# Patient Record
Sex: FEMALE | Race: WHITE | NOT HISPANIC OR LATINO | Employment: OTHER | URBAN - METROPOLITAN AREA
[De-identification: names, ages, dates, MRNs, and addresses within clinical notes are randomized per-mention and may not be internally consistent; named-entity substitution may affect disease eponyms.]

---

## 2017-01-18 RX ORDER — SIMVASTATIN 40 MG
40 TABLET ORAL NIGHTLY
Qty: 90 TABLET | Refills: 1 | Status: SHIPPED | OUTPATIENT
Start: 2017-01-18 | End: 2017-08-24 | Stop reason: SDUPTHER

## 2017-08-24 ENCOUNTER — OFFICE VISIT (OUTPATIENT)
Dept: INTERNAL MEDICINE | Facility: CLINIC | Age: 72
End: 2017-08-24

## 2017-08-24 VITALS
SYSTOLIC BLOOD PRESSURE: 148 MMHG | DIASTOLIC BLOOD PRESSURE: 84 MMHG | WEIGHT: 168 LBS | BODY MASS INDEX: 28.84 KG/M2 | OXYGEN SATURATION: 98 % | HEART RATE: 63 BPM

## 2017-08-24 DIAGNOSIS — R41.3 MEMORY CHANGES: ICD-10-CM

## 2017-08-24 DIAGNOSIS — Z00.00 HEALTH CARE MAINTENANCE: ICD-10-CM

## 2017-08-24 DIAGNOSIS — E78.5 HYPERLIPIDEMIA, UNSPECIFIED HYPERLIPIDEMIA TYPE: Primary | ICD-10-CM

## 2017-08-24 DIAGNOSIS — I10 ESSENTIAL HYPERTENSION: ICD-10-CM

## 2017-08-24 LAB
ALBUMIN SERPL-MCNC: 4.5 G/DL (ref 3.2–4.8)
ALBUMIN/GLOB SERPL: 1.5 G/DL (ref 1.5–2.5)
ALP SERPL-CCNC: 96 U/L (ref 25–100)
ALT SERPL W P-5'-P-CCNC: 18 U/L (ref 7–40)
ANION GAP SERPL CALCULATED.3IONS-SCNC: 6 MMOL/L (ref 3–11)
ARTICHOKE IGE QN: 188 MG/DL (ref 0–130)
AST SERPL-CCNC: 21 U/L (ref 0–33)
BILIRUB SERPL-MCNC: 0.5 MG/DL (ref 0.3–1.2)
BUN BLD-MCNC: 23 MG/DL (ref 9–23)
BUN/CREAT SERPL: 32.9 (ref 7–25)
CALCIUM SPEC-SCNC: 10 MG/DL (ref 8.7–10.4)
CHLORIDE SERPL-SCNC: 103 MMOL/L (ref 99–109)
CHOLEST SERPL-MCNC: 270 MG/DL (ref 0–200)
CO2 SERPL-SCNC: 30 MMOL/L (ref 20–31)
CREAT BLD-MCNC: 0.7 MG/DL (ref 0.6–1.3)
DEPRECATED RDW RBC AUTO: 42.9 FL (ref 37–54)
ERYTHROCYTE [DISTWIDTH] IN BLOOD BY AUTOMATED COUNT: 13.1 % (ref 11.3–14.5)
GFR SERPL CREATININE-BSD FRML MDRD: 83 ML/MIN/1.73
GLOBULIN UR ELPH-MCNC: 3 GM/DL
GLUCOSE BLD-MCNC: 116 MG/DL (ref 70–100)
HCT VFR BLD AUTO: 41.7 % (ref 34.5–44)
HCV AB SER DONR QL: NORMAL
HDLC SERPL-MCNC: 63 MG/DL (ref 40–60)
HGB BLD-MCNC: 13.4 G/DL (ref 11.5–15.5)
MCH RBC QN AUTO: 28.5 PG (ref 27–31)
MCHC RBC AUTO-ENTMCNC: 32.1 G/DL (ref 32–36)
MCV RBC AUTO: 88.5 FL (ref 80–99)
PLATELET # BLD AUTO: 282 10*3/MM3 (ref 150–450)
PMV BLD AUTO: 9.8 FL (ref 6–12)
POTASSIUM BLD-SCNC: 4.9 MMOL/L (ref 3.5–5.5)
PROT SERPL-MCNC: 7.5 G/DL (ref 5.7–8.2)
RBC # BLD AUTO: 4.71 10*6/MM3 (ref 3.89–5.14)
SODIUM BLD-SCNC: 139 MMOL/L (ref 132–146)
TRIGL SERPL-MCNC: 87 MG/DL (ref 0–150)
WBC NRBC COR # BLD: 7.38 10*3/MM3 (ref 3.5–10.8)

## 2017-08-24 PROCEDURE — 80061 LIPID PANEL: CPT | Performed by: PHYSICIAN ASSISTANT

## 2017-08-24 PROCEDURE — 85027 COMPLETE CBC AUTOMATED: CPT | Performed by: PHYSICIAN ASSISTANT

## 2017-08-24 PROCEDURE — 99213 OFFICE O/P EST LOW 20 MIN: CPT | Performed by: PHYSICIAN ASSISTANT

## 2017-08-24 PROCEDURE — 86803 HEPATITIS C AB TEST: CPT | Performed by: PHYSICIAN ASSISTANT

## 2017-08-24 PROCEDURE — 80053 COMPREHEN METABOLIC PANEL: CPT | Performed by: PHYSICIAN ASSISTANT

## 2017-08-24 RX ORDER — LISINOPRIL 10 MG/1
10 TABLET ORAL DAILY
Qty: 30 TABLET | Refills: 5 | Status: SHIPPED | OUTPATIENT
Start: 2017-08-24 | End: 2017-09-25 | Stop reason: SDUPTHER

## 2017-08-24 RX ORDER — SIMVASTATIN 40 MG
40 TABLET ORAL NIGHTLY
Qty: 90 TABLET | Refills: 1 | Status: SHIPPED | OUTPATIENT
Start: 2017-08-24 | End: 2018-01-05

## 2017-08-24 NOTE — PROGRESS NOTES
Chief Complaint   Patient presents with   • Follow-up     hyperlipidemia, lab work pt is fasting       Subjective   Mariaelena Easley is a 72 y.o. female.       History of Present Illness     Pt has had resolution of the intermittent diarrhea she was having at last appt. Occasionally has episodes but not has frequent as it was.    She has continued to take her usual vitamins and supplements, started taking calcium and a combination memory supplement.    She ran out of her simvastatin and has come in for cholesterol check.    Notes that she has noticed some increasing difficulty organizing her thoughts, finding words at times. She is a multi-yakelin and has realized that she does not complete tasks as well as she used to. 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      Current Outpatient Prescriptions:   •  aspirin (ASPIRIN LOW DOSE) 81 MG chewable tablet, Chew daily., Disp: , Rfl:   •  Cholecalciferol (VITAMIN D3) 5000 UNIT/ML liquid, Take  by mouth., Disp: , Rfl:   •  melatonin 5 MG tablet tablet, Take 5 mg by mouth., Disp: , Rfl:   •  simvastatin (ZOCOR) 40 MG tablet, Take 1 tablet by mouth Every Night., Disp: 90 tablet, Rfl: 1  •  vitamin B-12 (CYANOCOBALAMIN) 1000 MCG tablet, Take 1,000 mcg by mouth daily., Disp: , Rfl:   •  ZOLMitriptan (ZOMIG-ZMT) 5 MG disintegrating tablet, DISSOLVE 1 TAB ON TONGUE AND SWALLOW AT ONSET OF MIGRAINE. MAY REPEAT ONCE AFTER 2 HOURS. MAX 10 MG/DAY, Disp: 3 tablet, Rfl: 0  •  lisinopril (PRINIVIL,ZESTRIL) 10 MG tablet, Take 1 tablet by mouth Daily., Disp: 30 tablet, Rfl: 5     PMFSH  The following portions of the patient's history were reviewed and updated as appropriate: allergies, current medications, past family history, past medical history, past social history, past surgical history and problem list.    Review of Systems   Constitutional: Negative for chills, fatigue,  fever and unexpected weight change.   HENT: Negative.    Eyes: Negative for pain and visual disturbance.   Respiratory: Negative for chest tightness and shortness of breath.    Cardiovascular: Negative for chest pain and palpitations.   Gastrointestinal: Negative for abdominal pain and blood in stool.   Endocrine: Negative.    Genitourinary: Negative.    Musculoskeletal: Negative for joint swelling.   Skin: Negative for color change, rash and wound.   Allergic/Immunologic: Negative.    Neurological: Negative for dizziness, syncope, speech difficulty, light-headedness and headaches.   Hematological: Negative for adenopathy.   Psychiatric/Behavioral: Positive for decreased concentration. Negative for confusion, hallucinations, sleep disturbance and suicidal ideas. The patient is not nervous/anxious.        Objective   /84  Pulse 63  Wt 168 lb (76.2 kg)  LMP  (LMP Unknown)  SpO2 98%  BMI 28.84 kg/m2    Physical Exam   Constitutional: She appears well-developed and well-nourished.   HENT:   Head: Normocephalic.   Right Ear: Hearing, tympanic membrane, external ear and ear canal normal.   Left Ear: Hearing, tympanic membrane, external ear and ear canal normal.   Nose: Nose normal.   Mouth/Throat: Oropharynx is clear and moist.   Eyes: Conjunctivae are normal. Pupils are equal, round, and reactive to light.   Neck: Normal range of motion.   Cardiovascular: Normal rate, regular rhythm and normal heart sounds.    Pulmonary/Chest: Effort normal and breath sounds normal. She has no decreased breath sounds. She has no wheezes. She has no rhonchi. She has no rales.   Musculoskeletal: Normal range of motion.   Neurological: She is alert.   Skin: Skin is warm and dry.   Psychiatric: She has a normal mood and affect. Her behavior is normal.   Nursing note and vitals reviewed.      Results for orders placed or performed in visit on 08/24/17   Lipid Panel   Result Value Ref Range    Total Cholesterol 270 (H) 0 - 200 mg/dL     Triglycerides 87 0 - 150 mg/dL    HDL Cholesterol 63 (H) 40 - 60 mg/dL    LDL Cholesterol  188 (H) 0 - 130 mg/dL   Comprehensive Metabolic Panel   Result Value Ref Range    Glucose 116 (H) 70 - 100 mg/dL    BUN 23 9 - 23 mg/dL    Creatinine 0.70 0.60 - 1.30 mg/dL    Sodium 139 132 - 146 mmol/L    Potassium 4.9 3.5 - 5.5 mmol/L    Chloride 103 99 - 109 mmol/L    CO2 30.0 20.0 - 31.0 mmol/L    Calcium 10.0 8.7 - 10.4 mg/dL    Total Protein 7.5 5.7 - 8.2 g/dL    Albumin 4.50 3.20 - 4.80 g/dL    ALT (SGPT) 18 7 - 40 U/L    AST (SGOT) 21 0 - 33 U/L    Alkaline Phosphatase 96 25 - 100 U/L    Total Bilirubin 0.5 0.3 - 1.2 mg/dL    eGFR Non African Amer 83 >60 mL/min/1.73    Globulin 3.0 gm/dL    A/G Ratio 1.5 1.5 - 2.5 g/dL    BUN/Creatinine Ratio 32.9 (H) 7.0 - 25.0    Anion Gap 6.0 3.0 - 11.0 mmol/L   CBC (No Diff)   Result Value Ref Range    WBC 7.38 3.50 - 10.80 10*3/mm3    RBC 4.71 3.89 - 5.14 10*6/mm3    Hemoglobin 13.4 11.5 - 15.5 g/dL    Hematocrit 41.7 34.5 - 44.0 %    MCV 88.5 80.0 - 99.0 fL    MCH 28.5 27.0 - 31.0 pg    MCHC 32.1 32.0 - 36.0 g/dL    RDW 13.1 11.3 - 14.5 %    RDW-SD 42.9 37.0 - 54.0 fl    MPV 9.8 6.0 - 12.0 fL    Platelets 282 150 - 450 10*3/mm3   Hepatitis C Antibody   Result Value Ref Range    Hepatitis C Ab Non-Reactive Non-Reactive        ASSESSMENT/PLAN    Problem List Items Addressed This Visit        Cardiovascular and Mediastinum    Hyperlipidemia - Primary     Recheck lipid profile         Relevant Medications    simvastatin (ZOCOR) 40 MG tablet    Other Relevant Orders    Lipid Panel (Completed)    Comprehensive Metabolic Panel (Completed)    Essential hypertension     Hypertension is newly identified.  Dietary sodium restriction.  Weight loss.  Regular aerobic exercise.  Medication changes per orders. Start Lisinopril 10 mg daily.  Blood pressure will be reassessed in 4 weeks.         Relevant Medications    lisinopril (PRINIVIL,ZESTRIL) 10 MG tablet    Other Relevant Orders    CBC  (No Diff) (Completed)      Other Visit Diagnoses     Health care maintenance        Memory changes        Reassurance that MMSE is normal. Changes likely secondary to increased stress and attempting to take on too many responsibilities.               Return in about 4 weeks (around 9/21/2017) for Recheck.

## 2017-08-26 PROBLEM — I10 ESSENTIAL HYPERTENSION: Status: ACTIVE | Noted: 2017-08-26

## 2017-08-27 NOTE — ASSESSMENT & PLAN NOTE
Hypertension is newly identified.  Dietary sodium restriction.  Weight loss.  Regular aerobic exercise.  Medication changes per orders. Start Lisinopril 10 mg daily.  Blood pressure will be reassessed in 4 weeks.

## 2017-09-25 ENCOUNTER — OFFICE VISIT (OUTPATIENT)
Dept: INTERNAL MEDICINE | Facility: CLINIC | Age: 72
End: 2017-09-25

## 2017-09-25 VITALS
WEIGHT: 164.24 LBS | BODY MASS INDEX: 28.19 KG/M2 | SYSTOLIC BLOOD PRESSURE: 132 MMHG | HEART RATE: 69 BPM | DIASTOLIC BLOOD PRESSURE: 68 MMHG | OXYGEN SATURATION: 98 %

## 2017-09-25 DIAGNOSIS — R73.03 PREDIABETES: ICD-10-CM

## 2017-09-25 DIAGNOSIS — I10 ESSENTIAL HYPERTENSION: ICD-10-CM

## 2017-09-25 DIAGNOSIS — G43.009 NONINTRACTABLE MIGRAINE, UNSPECIFIED MIGRAINE TYPE: Primary | ICD-10-CM

## 2017-09-25 LAB
ANION GAP SERPL CALCULATED.3IONS-SCNC: 8 MMOL/L (ref 3–11)
BUN BLD-MCNC: 15 MG/DL (ref 9–23)
BUN/CREAT SERPL: 18.8 (ref 7–25)
CALCIUM SPEC-SCNC: 10.3 MG/DL (ref 8.7–10.4)
CHLORIDE SERPL-SCNC: 101 MMOL/L (ref 99–109)
CO2 SERPL-SCNC: 32 MMOL/L (ref 20–31)
CREAT BLD-MCNC: 0.8 MG/DL (ref 0.6–1.3)
GFR SERPL CREATININE-BSD FRML MDRD: 71 ML/MIN/1.73
GLUCOSE BLD-MCNC: 102 MG/DL (ref 70–100)
HBA1C MFR BLD: 6.4 %
POTASSIUM BLD-SCNC: 4.5 MMOL/L (ref 3.5–5.5)
SODIUM BLD-SCNC: 141 MMOL/L (ref 132–146)

## 2017-09-25 PROCEDURE — 99214 OFFICE O/P EST MOD 30 MIN: CPT | Performed by: PHYSICIAN ASSISTANT

## 2017-09-25 PROCEDURE — 83036 HEMOGLOBIN GLYCOSYLATED A1C: CPT | Performed by: PHYSICIAN ASSISTANT

## 2017-09-25 PROCEDURE — G0008 ADMIN INFLUENZA VIRUS VAC: HCPCS | Performed by: PHYSICIAN ASSISTANT

## 2017-09-25 PROCEDURE — 80048 BASIC METABOLIC PNL TOTAL CA: CPT | Performed by: PHYSICIAN ASSISTANT

## 2017-09-25 PROCEDURE — 90662 IIV NO PRSV INCREASED AG IM: CPT | Performed by: PHYSICIAN ASSISTANT

## 2017-09-25 RX ORDER — LISINOPRIL 10 MG/1
10 TABLET ORAL DAILY
Qty: 90 TABLET | Refills: 3 | Status: SHIPPED | OUTPATIENT
Start: 2017-09-25 | End: 2018-01-05

## 2017-09-25 RX ORDER — ZOLMITRIPTAN 5 MG/1
TABLET, ORALLY DISINTEGRATING ORAL
Qty: 5 TABLET | Refills: 3 | Status: SHIPPED | OUTPATIENT
Start: 2017-09-25 | End: 2017-11-21

## 2017-09-25 NOTE — PROGRESS NOTES
Chief Complaint   Patient presents with   • Follow-up     hypertension, pt is fasting       Subjective   Mariaelena Easley is a 72 y.o. female.       History of Present Illness     Pt has been taking the lisinopril daily as directed. She checked her BP at [facility] one time and the reading was 130 systolic.     She also notes that she has been checking her glucose with her 's glucometer. Her readings have been above 100 when she checks.    She has been back on her statin since she was last here. Has taken it consistently for the past 4 weeks.    Needs refill of zomig she uses for silent migraines. Can use it at the onset of migraine and then goes to bed and sleeps it off.      Current Outpatient Prescriptions:   •  aspirin (ASPIRIN LOW DOSE) 81 MG chewable tablet, Chew daily., Disp: , Rfl:   •  Cholecalciferol (VITAMIN D3) 5000 UNIT/ML liquid, Take  by mouth., Disp: , Rfl:   •  lisinopril (PRINIVIL,ZESTRIL) 10 MG tablet, Take 1 tablet by mouth Daily., Disp: 90 tablet, Rfl: 3  •  melatonin 5 MG tablet tablet, Take 5 mg by mouth., Disp: , Rfl:   •  simvastatin (ZOCOR) 40 MG tablet, Take 1 tablet by mouth Every Night., Disp: 90 tablet, Rfl: 1  •  vitamin B-12 (CYANOCOBALAMIN) 1000 MCG tablet, Take 1,000 mcg by mouth daily., Disp: , Rfl:   •  ZOLMitriptan (ZOMIG-ZMT) 5 MG disintegrating tablet, DISSOLVE 1 TAB ON TONGUE AND SWALLOW AT ONSET OF MIGRAINE. MAY REPEAT ONCE AFTER 2 HOURS. MAX 10 MG/DAY, Disp: 5 tablet, Rfl: 3     PMFSH  The following portions of the patient's history were reviewed and updated as appropriate: allergies, current medications, past family history, past medical history, past social history, past surgical history and problem list.    Review of Systems   Constitutional: Negative for activity change, appetite change, diaphoresis, fatigue and fever.   HENT: Negative for postnasal drip and rhinorrhea.    Respiratory: Negative for chest tightness, shortness of breath and wheezing.    Cardiovascular:  Negative for chest pain, palpitations and leg swelling.   Gastrointestinal: Negative.    Genitourinary: Negative.    Musculoskeletal: Negative for arthralgias and myalgias.   Skin: Negative.    Neurological: Negative for dizziness, syncope, weakness and light-headedness.   Hematological: Negative for adenopathy.       Objective   /68  Pulse 69  Wt 164 lb 3.9 oz (74.5 kg)  LMP  (LMP Unknown)  SpO2 98%  BMI 28.19 kg/m2    Physical Exam   Constitutional: She appears well-developed and well-nourished.   HENT:   Head: Normocephalic.   Right Ear: Hearing, tympanic membrane, external ear and ear canal normal.   Left Ear: Hearing, tympanic membrane, external ear and ear canal normal.   Nose: Nose normal.   Mouth/Throat: Oropharynx is clear and moist.   Eyes: Conjunctivae are normal. Pupils are equal, round, and reactive to light.   Neck: Normal range of motion.   Cardiovascular: Normal rate, regular rhythm and normal heart sounds.    Pulmonary/Chest: Effort normal and breath sounds normal. She has no decreased breath sounds. She has no wheezes. She has no rhonchi. She has no rales.   Musculoskeletal: Normal range of motion.   Neurological: She is alert.   Skin: Skin is warm and dry.   Psychiatric: She has a normal mood and affect. Her behavior is normal.   Nursing note and vitals reviewed.      Results for orders placed or performed in visit on 09/25/17   Basic Metabolic Panel   Result Value Ref Range    Glucose 102 (H) 70 - 100 mg/dL    BUN 15 9 - 23 mg/dL    Creatinine 0.80 0.60 - 1.30 mg/dL    Sodium 141 132 - 146 mmol/L    Potassium 4.5 3.5 - 5.5 mmol/L    Chloride 101 99 - 109 mmol/L    CO2 32.0 (H) 20.0 - 31.0 mmol/L    Calcium 10.3 8.7 - 10.4 mg/dL    eGFR Non African Amer 71 >60 mL/min/1.73    BUN/Creatinine Ratio 18.8 7.0 - 25.0    Anion Gap 8.0 3.0 - 11.0 mmol/L   POC Glycosylated Hemoglobin (Hb A1C)   Result Value Ref Range    Hemoglobin A1C 6.4 %        ASSESSMENT/PLAN    Problem List Items Addressed  This Visit        Cardiovascular and Mediastinum    Migraine headache - Primary    Relevant Medications    ZOLMitriptan (ZOMIG-ZMT) 5 MG disintegrating tablet    Essential hypertension     Hypertension is improving with treatment.  Continue current treatment regimen.  Blood pressure will be reassessed at the next regular appointment.         Relevant Medications    lisinopril (PRINIVIL,ZESTRIL) 10 MG tablet    Other Relevant Orders    Basic Metabolic Panel (Completed)       Other    Prediabetes     Discussed diet and exercise recommendation. She will work on lifestyle changes.         Relevant Orders    POC Glycosylated Hemoglobin (Hb A1C) (Completed)               Return in about 3 months (around 12/25/2017) for Recheck cholesterol.

## 2017-11-10 ENCOUNTER — TELEPHONE (OUTPATIENT)
Dept: INTERNAL MEDICINE | Facility: CLINIC | Age: 72
End: 2017-11-10

## 2017-11-10 NOTE — TELEPHONE ENCOUNTER
Received PA request via fax from [facility] pharmacy for Zolmitriptan 5 mg ODT tab, submitted PA via [email or web address], awaiting response.     11/13/17  PA was denied due to absence of trial and failure of the preferred alternatives first. Denial ppw given to prescriber for further review.

## 2017-11-21 ENCOUNTER — TELEPHONE (OUTPATIENT)
Dept: INTERNAL MEDICINE | Facility: CLINIC | Age: 72
End: 2017-11-21

## 2017-11-21 DIAGNOSIS — G43.909 MIGRAINE WITHOUT STATUS MIGRAINOSUS, NOT INTRACTABLE, UNSPECIFIED MIGRAINE TYPE: Primary | ICD-10-CM

## 2017-11-21 RX ORDER — PROMETHAZINE HYDROCHLORIDE 25 MG/1
25 TABLET ORAL EVERY 6 HOURS PRN
Qty: 20 TABLET | Refills: 0 | Status: SHIPPED | OUTPATIENT
Start: 2017-11-21 | End: 2017-11-21 | Stop reason: SDUPTHER

## 2017-11-21 RX ORDER — RIZATRIPTAN BENZOATE 10 MG/1
10 TABLET, ORALLY DISINTEGRATING ORAL ONCE AS NEEDED
Qty: 9 TABLET | Refills: 5 | Status: SHIPPED | OUTPATIENT
Start: 2017-11-21 | End: 2019-04-17 | Stop reason: SDUPTHER

## 2017-11-21 RX ORDER — PROMETHAZINE HYDROCHLORIDE 25 MG/1
25 TABLET ORAL EVERY 6 HOURS PRN
Qty: 20 TABLET | Refills: 0 | Status: SHIPPED | OUTPATIENT
Start: 2017-11-21 | End: 2021-12-15

## 2017-11-21 NOTE — TELEPHONE ENCOUNTER
PT CALLED AND HAS HAD REALLY BAD MIGRAINES AND SHE CANNOT GET RID OF THE NAUSEA. PT WAS WONDERING WHAT BOBBY RECOMMENDED SHE TAKE TO HELP HER NAUSEA.     PLEASE GIVE PT A CALL.     THANKS.

## 2017-11-21 NOTE — TELEPHONE ENCOUNTER
Called and informed pt, per pt she doesn't recall trying anything else besides the Zomig nasal spray and Zomig tablets.

## 2017-11-21 NOTE — TELEPHONE ENCOUNTER
Filled out PA form for promethazine 25 mg tab, gave to [employer] to sign prior to faxing.     11/28/17  Pa for Promethazine was approved 11/21/17-11/28/18, pharmacy notified.

## 2017-11-21 NOTE — TELEPHONE ENCOUNTER
Sent in phenergan for her to take for nausea.    Also, received paperwork that her zomig was denied by her insurance. Has she ever tried the maxalt disintegrating tablet or anything else? They are asking that she try some of the formulary options if she has not.

## 2017-11-21 NOTE — TELEPHONE ENCOUNTER
Okay thanks. I sent in maxalt 10 mg disintegrating tablet for her to try since it is on her formulary.

## 2017-11-21 NOTE — TELEPHONE ENCOUNTER
PATIENT CALLED AGAIN, SHE IS REQUESTING AN RX FOR NAUSEA TO BE SENT TO LOCAL [facility] IN [place].

## 2017-12-04 ENCOUNTER — TRANSCRIBE ORDERS (OUTPATIENT)
Dept: ADMINISTRATIVE | Facility: HOSPITAL | Age: 72
End: 2017-12-04

## 2017-12-04 DIAGNOSIS — Z12.31 VISIT FOR SCREENING MAMMOGRAM: Primary | ICD-10-CM

## 2017-12-28 ENCOUNTER — APPOINTMENT (OUTPATIENT)
Dept: MAMMOGRAPHY | Facility: HOSPITAL | Age: 72
End: 2017-12-28

## 2018-01-05 ENCOUNTER — OFFICE VISIT (OUTPATIENT)
Dept: INTERNAL MEDICINE | Facility: CLINIC | Age: 73
End: 2018-01-05

## 2018-01-05 VITALS
HEART RATE: 69 BPM | DIASTOLIC BLOOD PRESSURE: 78 MMHG | WEIGHT: 168 LBS | OXYGEN SATURATION: 99 % | BODY MASS INDEX: 28.84 KG/M2 | SYSTOLIC BLOOD PRESSURE: 130 MMHG

## 2018-01-05 DIAGNOSIS — E78.5 HYPERLIPIDEMIA, UNSPECIFIED HYPERLIPIDEMIA TYPE: ICD-10-CM

## 2018-01-05 DIAGNOSIS — R73.03 PREDIABETES: Primary | ICD-10-CM

## 2018-01-05 DIAGNOSIS — I10 ESSENTIAL HYPERTENSION: ICD-10-CM

## 2018-01-05 LAB — HBA1C MFR BLD: 6.1 %

## 2018-01-05 PROCEDURE — 99214 OFFICE O/P EST MOD 30 MIN: CPT | Performed by: PHYSICIAN ASSISTANT

## 2018-01-05 PROCEDURE — 83036 HEMOGLOBIN GLYCOSYLATED A1C: CPT | Performed by: PHYSICIAN ASSISTANT

## 2018-01-05 RX ORDER — PRAVASTATIN SODIUM 40 MG
40 TABLET ORAL NIGHTLY
Qty: 30 TABLET | Refills: 3 | Status: SHIPPED | OUTPATIENT
Start: 2018-01-05 | End: 2018-02-05 | Stop reason: SDUPTHER

## 2018-01-05 RX ORDER — AMLODIPINE BESYLATE 5 MG/1
5 TABLET ORAL DAILY
Qty: 30 TABLET | Refills: 3 | Status: SHIPPED | OUTPATIENT
Start: 2018-01-05 | End: 2018-01-30 | Stop reason: SDUPTHER

## 2018-01-05 NOTE — PROGRESS NOTES
Chief Complaint   Patient presents with   • Follow-up     hypertension, prediabetes       Subjective   Mariaelena Easley is a 72 y.o. female.       History of Present Illness     Pt has been working on reducing the carbohydrates in her diet and trying to stay more active. 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    Notes that she had a mild cough in September and it has continued since then, was not sure if it was related to her medicine at the time. Pt feels like her cough is somewhat productive.       Current Outpatient Prescriptions:   •  aspirin (ASPIRIN LOW DOSE) 81 MG chewable tablet, Chew daily., Disp: , Rfl:   •  Cholecalciferol (VITAMIN D3) 5000 UNIT/ML liquid, Take  by mouth., Disp: , Rfl:   •  melatonin 5 MG tablet tablet, Take 5 mg by mouth., Disp: , Rfl:   •  promethazine (PHENERGAN) 25 MG tablet, Take 1 tablet by mouth Every 6 (Six) Hours As Needed for Nausea or Vomiting., Disp: 20 tablet, Rfl: 0  •  rizatriptan MLT (MAXALT-MLT) 10 MG disintegrating tablet, Take 1 tablet by mouth 1 (One) Time As Needed for Migraine for up to 1 dose. May repeat in 2 hours if needed, Disp: 9 tablet, Rfl: 5  •  vitamin B-12 (CYANOCOBALAMIN) 1000 MCG tablet, Take 1,000 mcg by mouth daily., Disp: , Rfl:   •  amLODIPine (NORVASC) 5 MG tablet, Take 1 tablet by mouth Daily., Disp: 30 tablet, Rfl: 3  •  pravastatin (PRAVACHOL) 40 MG tablet, Take 1 tablet by mouth Every Night., Disp: 30 tablet, Rfl: 3     PMFSH  The following portions of the patient's history were reviewed and updated as appropriate: allergies, current medications, past family history, past medical history, past social history, past surgical history and problem list.    Review of Systems   Constitutional: Negative for activity change, appetite change and fatigue.   HENT: Negative for congestion, postnasal drip, rhinorrhea, sinus pressure and sore throat.    Respiratory: Positive for cough. Negative for chest  tightness, shortness of breath and wheezing.    Cardiovascular: Negative for chest pain and leg swelling.   Musculoskeletal: Negative for arthralgias and myalgias.   Neurological: Negative for dizziness, weakness, light-headedness and headaches.   Psychiatric/Behavioral: Negative.        Objective   /78  Pulse 69  Wt 76.2 kg (168 lb)  LMP  (LMP Unknown)  SpO2 99%  BMI 28.84 kg/m2    Physical Exam   Constitutional: She appears well-developed and well-nourished.   HENT:   Head: Normocephalic.   Right Ear: Hearing, tympanic membrane, external ear and ear canal normal.   Left Ear: Hearing, tympanic membrane, external ear and ear canal normal.   Nose: Nose normal.   Mouth/Throat: Oropharynx is clear and moist.   Eyes: Conjunctivae are normal. Pupils are equal, round, and reactive to light.   Neck: Normal range of motion.   Cardiovascular: Normal rate, regular rhythm and normal heart sounds.    Pulmonary/Chest: Effort normal and breath sounds normal. She has no decreased breath sounds. She has no wheezes. She has no rhonchi. She has no rales.   Musculoskeletal: Normal range of motion.   Neurological: She is alert.   Skin: Skin is warm and dry.   Psychiatric: She has a normal mood and affect. Her behavior is normal.   Nursing note and vitals reviewed.      Results for orders placed or performed in visit on 01/05/18   POC Glycosylated Hemoglobin (Hb A1C)   Result Value Ref Range    Hemoglobin A1C 6.1 %        ASSESSMENT/PLAN    Problem List Items Addressed This Visit        Cardiovascular and Mediastinum    Hyperlipidemia     Change from simvastatin to pravastatin in order to start amlodipine to replace lisinopril.         Relevant Medications    pravastatin (PRAVACHOL) 40 MG tablet    Essential hypertension     Hypertension is unchanged.  Medication changes per orders. D/t SE of cough, d/c lisinopril and start amlodipine 5 mg daily.  Blood pressure will be reassessed in 4 weeks.         Relevant Medications     amLODIPine (NORVASC) 5 MG tablet       Other    Prediabetes - Primary     Improved since last check. Continue diet changes.         Relevant Orders    POC Glycosylated Hemoglobin (Hb A1C) (Completed)               Return in about 4 weeks (around 2/2/2018) for Recheck.

## 2018-01-07 NOTE — ASSESSMENT & PLAN NOTE
Hypertension is unchanged.  Medication changes per orders. D/t SE of cough, d/c lisinopril and start amlodipine 5 mg daily.  Blood pressure will be reassessed in 4 weeks.

## 2018-01-10 ENCOUNTER — HOSPITAL ENCOUNTER (OUTPATIENT)
Dept: MAMMOGRAPHY | Facility: HOSPITAL | Age: 73
Discharge: HOME | End: 2018-01-10
Admitting: PHYSICIAN ASSISTANT

## 2018-01-10 DIAGNOSIS — Z12.31 VISIT FOR SCREENING MAMMOGRAM: ICD-10-CM

## 2018-01-10 PROCEDURE — 77063 BREAST TOMOSYNTHESIS BI: CPT | Performed by: RADIOLOGY

## 2018-01-10 PROCEDURE — 77063 BREAST TOMOSYNTHESIS BI: CPT

## 2018-01-10 PROCEDURE — 77067 SCR MAMMO BI INCL CAD: CPT | Performed by: RADIOLOGY

## 2018-01-10 PROCEDURE — 77067 SCR MAMMO BI INCL CAD: CPT

## 2018-01-30 ENCOUNTER — OFFICE VISIT (OUTPATIENT)
Dept: INTERNAL MEDICINE | Facility: CLINIC | Age: 73
End: 2018-01-30

## 2018-01-30 VITALS
WEIGHT: 172 LBS | DIASTOLIC BLOOD PRESSURE: 76 MMHG | BODY MASS INDEX: 29.52 KG/M2 | SYSTOLIC BLOOD PRESSURE: 148 MMHG | HEART RATE: 81 BPM | OXYGEN SATURATION: 98 %

## 2018-01-30 DIAGNOSIS — I10 ESSENTIAL HYPERTENSION: Primary | ICD-10-CM

## 2018-01-30 DIAGNOSIS — G43.909 MIGRAINE WITHOUT STATUS MIGRAINOSUS, NOT INTRACTABLE, UNSPECIFIED MIGRAINE TYPE: ICD-10-CM

## 2018-01-30 DIAGNOSIS — R07.2 PRECORDIAL PAIN: ICD-10-CM

## 2018-01-30 PROCEDURE — 99213 OFFICE O/P EST LOW 20 MIN: CPT | Performed by: PHYSICIAN ASSISTANT

## 2018-01-30 PROCEDURE — 93000 ELECTROCARDIOGRAM COMPLETE: CPT | Performed by: PHYSICIAN ASSISTANT

## 2018-01-30 RX ORDER — AMLODIPINE BESYLATE 5 MG/1
7.5 TABLET ORAL DAILY
Qty: 135 TABLET | Refills: 1 | Status: SHIPPED | OUTPATIENT
Start: 2018-01-30 | End: 2018-06-18 | Stop reason: SDUPTHER

## 2018-01-30 NOTE — PROGRESS NOTES
Chief Complaint   Patient presents with   • Follow-up     hypertension       Subjective   Mariaelena Easley is a 72 y.o. female.       History of Present Illness     After her last visit, pt stopped the lisinopril and started the amlodipine 5 mg daily. She has not been checking her BP at home. Notes that today was a stressful day with some bad weather driving this morning. Had to drop her  off for a stress test this morning as well.    Pt notes that she has had another migraine episode since she was last here. She took the maxalt and was able to prevent it from turning into a full blown episode with nausea, vomiting and diarrhea.    A couple weeks ago pt slipped and fell off some steps at her son's house and landed on her left elbow and right hip and fell backward and hit the back of her head. Everything resolved without any residual effects.    Her cough has improved since stopping the lisinopril.    Notes she had an episode of left sided chest pain while riding as a passenger in a car. Had pain under her left breast. No shortness of breath or palpitations or chest tightness. Lasted about 10-15 minutes. Has not happened again. No other symptoms.      Current Outpatient Prescriptions:   •  amLODIPine (NORVASC) 5 MG tablet, Take 1.5 tablets by mouth Daily., Disp: 135 tablet, Rfl: 1  •  aspirin (ASPIRIN LOW DOSE) 81 MG chewable tablet, Chew daily., Disp: , Rfl:   •  Cholecalciferol (VITAMIN D3) 5000 UNIT/ML liquid, Take  by mouth., Disp: , Rfl:   •  melatonin 5 MG tablet tablet, Take 5 mg by mouth., Disp: , Rfl:   •  pravastatin (PRAVACHOL) 40 MG tablet, Take 1 tablet by mouth Every Night., Disp: 30 tablet, Rfl: 3  •  promethazine (PHENERGAN) 25 MG tablet, Take 1 tablet by mouth Every 6 (Six) Hours As Needed for Nausea or Vomiting., Disp: 20 tablet, Rfl: 0  •  rizatriptan MLT (MAXALT-MLT) 10 MG disintegrating tablet, Take 1 tablet by mouth 1 (One) Time As Needed for Migraine for up to 1 dose. May repeat in 2  hours if needed, Disp: 9 tablet, Rfl: 5  •  vitamin B-12 (CYANOCOBALAMIN) 1000 MCG tablet, Take 1,000 mcg by mouth daily., Disp: , Rfl:      PMFSH  The following portions of the patient's history were reviewed and updated as appropriate: allergies, current medications, past family history, past medical history, past social history, past surgical history and problem list.    Review of Systems   Constitutional: Negative for fever and unexpected weight change.   HENT: Negative for ear pain, nosebleeds and trouble swallowing.    Eyes: Negative for pain.   Respiratory: Positive for chest tightness and shortness of breath. Negative for choking and wheezing.    Cardiovascular: Positive for chest pain.   Gastrointestinal: Negative for abdominal pain, blood in stool and vomiting.   Endocrine: Negative.    Genitourinary: Negative.    Musculoskeletal: Negative for joint swelling.   Allergic/Immunologic: Negative.    Neurological: Negative for seizures and syncope.   Hematological: Negative for adenopathy.   Psychiatric/Behavioral: Negative.        Objective   /76  Pulse 81  Wt 78 kg (172 lb)  LMP  (LMP Unknown)  SpO2 98%  BMI 29.52 kg/m2    Physical Exam   Constitutional: She is oriented to person, place, and time. She appears well-developed and well-nourished. No distress.   HENT:   Head: Normocephalic and atraumatic.   Eyes: Conjunctivae and EOM are normal. Pupils are equal, round, and reactive to light.   Neck: Normal range of motion. Neck supple.   Cardiovascular: Normal rate, regular rhythm, normal heart sounds and intact distal pulses.  Exam reveals no gallop.    No murmur heard.  Pulmonary/Chest: Effort normal and breath sounds normal. No respiratory distress. She has no wheezes. She has no rales. She exhibits no tenderness.   Abdominal: Soft.   Musculoskeletal: Normal range of motion.   Neurological: She is alert and oriented to person, place, and time. She exhibits normal muscle tone. Coordination normal.    Skin: Skin is warm and dry. She is not diaphoretic. No erythema.   Psychiatric: She has a normal mood and affect. Her behavior is normal. Judgment and thought content normal.   Nursing note and vitals reviewed.      Results for orders placed or performed in visit on 01/05/18   POC Glycosylated Hemoglobin (Hb A1C)   Result Value Ref Range    Hemoglobin A1C 6.1 %        ECG 12 Lead  Date/Time: 1/30/2018 5:02 PM  Performed by: BOBBY LEVI  Authorized by: BOBBY LEVI   Comparison: not compared with previous ECG   Previous ECG: no previous ECG available  Rhythm: sinus rhythm  Rate: normal  Conduction: conduction normal  ST Segments: ST segments normal  T depression: V1, V2, V3 and III  T flattening: aVF  QRS axis: normal  Clinical impression: abnormal ECG              ASSESSMENT/PLAN    Problem List Items Addressed This Visit        Cardiovascular and Mediastinum    Migraine headache     Headaches are unchanged.  Continue current treatment regimen.             Relevant Medications    amLODIPine (NORVASC) 5 MG tablet    Essential hypertension - Primary     Hypertension is worsening.  Dietary sodium restriction.  Weight loss.  Regular aerobic exercise.  Medication changes per orders.  Ambulatory blood pressure monitoring. Increase amlodipine to 7.5 mg daily.  Blood pressure will be reassessed in 4 weeks.         Relevant Medications    amLODIPine (NORVASC) 5 MG tablet      Other Visit Diagnoses     Precordial pain        ECG shows anterior t wave inversion, no prior ECG to  compare with, although pt is aware of prev abn ECG. Refer for stress test.    Relevant Orders    ECG 12 Lead    Stress test with myocardial perfusion               Return in about 6 weeks (around 3/13/2018) for Medicare Wellness.

## 2018-02-05 DIAGNOSIS — E78.5 HYPERLIPIDEMIA, UNSPECIFIED HYPERLIPIDEMIA TYPE: ICD-10-CM

## 2018-02-05 RX ORDER — PRAVASTATIN SODIUM 40 MG
40 TABLET ORAL NIGHTLY
Qty: 90 TABLET | Refills: 0 | Status: SHIPPED | OUTPATIENT
Start: 2018-02-05 | End: 2018-03-26 | Stop reason: SDUPTHER

## 2018-02-16 ENCOUNTER — HOSPITAL ENCOUNTER (OUTPATIENT)
Dept: CARDIOLOGY | Facility: HOSPITAL | Age: 73
Discharge: HOME | End: 2018-02-16

## 2018-02-16 VITALS — BODY MASS INDEX: 28.17 KG/M2 | WEIGHT: 165 LBS | HEIGHT: 64 IN

## 2018-02-16 DIAGNOSIS — R07.2 PRECORDIAL PAIN: ICD-10-CM

## 2018-02-16 LAB
BH CV STRESS BP STAGE 1: NORMAL
BH CV STRESS BP STAGE 2: NORMAL
BH CV STRESS DURATION MIN STAGE 1: 3
BH CV STRESS DURATION MIN STAGE 2: 2
BH CV STRESS DURATION SEC STAGE 1: 0
BH CV STRESS DURATION SEC STAGE 2: 40
BH CV STRESS GRADE STAGE 1: 10
BH CV STRESS GRADE STAGE 2: 12
BH CV STRESS HR STAGE 1: 123
BH CV STRESS HR STAGE 2: 146
BH CV STRESS METS STAGE 1: 5
BH CV STRESS METS STAGE 2: 7.5
BH CV STRESS PROTOCOL 1: NORMAL
BH CV STRESS RECOVERY BP: NORMAL MMHG
BH CV STRESS RECOVERY HR: 90 BPM
BH CV STRESS SPEED STAGE 1: 1.7
BH CV STRESS SPEED STAGE 2: 2.5
BH CV STRESS STAGE 1: 1
BH CV STRESS STAGE 2: 2
LV EF NUC BP: 66 %
MAXIMAL PREDICTED HEART RATE: 150 BPM
PERCENT MAX PREDICTED HR: 98.67 %
STRESS BASELINE BP: NORMAL MMHG
STRESS BASELINE HR: 71 BPM
STRESS PERCENT HR: 116 %
STRESS POST ESTIMATED WORKLOAD: 7 METS
STRESS POST EXERCISE DUR MIN: 8 MIN
STRESS POST EXERCISE DUR SEC: 40 SEC
STRESS POST PEAK BP: NORMAL MMHG
STRESS POST PEAK HR: 148 BPM
STRESS TARGET HR: 128 BPM

## 2018-02-16 PROCEDURE — A9500 TC99M SESTAMIBI: HCPCS | Performed by: PHYSICIAN ASSISTANT

## 2018-02-16 PROCEDURE — 93018 CV STRESS TEST I&R ONLY: CPT | Performed by: INTERNAL MEDICINE

## 2018-02-16 PROCEDURE — 0 TECHNETIUM SESTAMIBI: Performed by: PHYSICIAN ASSISTANT

## 2018-02-16 PROCEDURE — 93017 CV STRESS TEST TRACING ONLY: CPT

## 2018-02-16 PROCEDURE — 78452 HT MUSCLE IMAGE SPECT MULT: CPT

## 2018-02-16 PROCEDURE — 78452 HT MUSCLE IMAGE SPECT MULT: CPT | Performed by: INTERNAL MEDICINE

## 2018-02-16 RX ADMIN — TECHNETIUM TC 99M SESTAMIBI 1 DOSE: 1 INJECTION INTRAVENOUS at 10:05

## 2018-02-16 RX ADMIN — TECHNETIUM TC 99M SESTAMIBI 1 DOSE: 1 INJECTION INTRAVENOUS at 12:10

## 2018-02-20 ENCOUNTER — TELEPHONE (OUTPATIENT)
Dept: INTERNAL MEDICINE | Facility: CLINIC | Age: 73
End: 2018-02-20

## 2018-02-20 DIAGNOSIS — R94.39 ABNORMAL FINDING ON CARDIOVASCULAR STRESS TEST: Primary | ICD-10-CM

## 2018-02-20 NOTE — TELEPHONE ENCOUNTER
Spoke with patient and gave her results of stress test. Discussed options and she would like to see a cardiologist to discuss the mild abnormality on her stress test. Requested Dr Mcdaniel, her  sees him. She has not had anymore instances of chest pain since her last visit. Ordered the referral.

## 2018-03-16 ENCOUNTER — OFFICE VISIT (OUTPATIENT)
Dept: INTERNAL MEDICINE | Facility: CLINIC | Age: 73
End: 2018-03-16

## 2018-03-16 DIAGNOSIS — E78.5 HYPERLIPIDEMIA, UNSPECIFIED HYPERLIPIDEMIA TYPE: ICD-10-CM

## 2018-03-16 DIAGNOSIS — Z00.00 ENCOUNTER FOR MEDICARE ANNUAL WELLNESS EXAM: Primary | ICD-10-CM

## 2018-03-16 DIAGNOSIS — Z78.0 POST-MENOPAUSAL: ICD-10-CM

## 2018-03-16 DIAGNOSIS — I10 ESSENTIAL HYPERTENSION: ICD-10-CM

## 2018-03-16 DIAGNOSIS — D64.9 ANEMIA, UNSPECIFIED TYPE: ICD-10-CM

## 2018-03-16 LAB
ALBUMIN SERPL-MCNC: 4.4 G/DL (ref 3.2–4.8)
ALBUMIN/GLOB SERPL: 1.4 G/DL (ref 1.5–2.5)
ALP SERPL-CCNC: 87 U/L (ref 25–100)
ALT SERPL W P-5'-P-CCNC: 25 U/L (ref 7–40)
ANION GAP SERPL CALCULATED.3IONS-SCNC: 8 MMOL/L (ref 3–11)
ARTICHOKE IGE QN: 154 MG/DL (ref 0–130)
AST SERPL-CCNC: 24 U/L (ref 0–33)
BASOPHILS # BLD AUTO: 0.03 10*3/MM3 (ref 0–0.2)
BASOPHILS NFR BLD AUTO: 0.4 % (ref 0–1)
BILIRUB SERPL-MCNC: 0.6 MG/DL (ref 0.3–1.2)
BUN BLD-MCNC: 23 MG/DL (ref 9–23)
BUN/CREAT SERPL: 38.3 (ref 7–25)
CALCIUM SPEC-SCNC: 9.4 MG/DL (ref 8.7–10.4)
CHLORIDE SERPL-SCNC: 105 MMOL/L (ref 99–109)
CHOLEST SERPL-MCNC: 226 MG/DL (ref 0–200)
CO2 SERPL-SCNC: 28 MMOL/L (ref 20–31)
CREAT BLD-MCNC: 0.6 MG/DL (ref 0.6–1.3)
DEPRECATED RDW RBC AUTO: 41.8 FL (ref 37–54)
EOSINOPHIL # BLD AUTO: 0.09 10*3/MM3 (ref 0–0.3)
EOSINOPHIL NFR BLD AUTO: 1.3 % (ref 0–3)
ERYTHROCYTE [DISTWIDTH] IN BLOOD BY AUTOMATED COUNT: 13.1 % (ref 11.3–14.5)
GFR SERPL CREATININE-BSD FRML MDRD: 99 ML/MIN/1.73
GLOBULIN UR ELPH-MCNC: 3.2 GM/DL
GLUCOSE BLD-MCNC: 118 MG/DL (ref 70–100)
HCT VFR BLD AUTO: 40.2 % (ref 34.5–44)
HDLC SERPL-MCNC: 69 MG/DL (ref 40–60)
HGB BLD-MCNC: 12.9 G/DL (ref 11.5–15.5)
IMM GRANULOCYTES # BLD: 0.02 10*3/MM3 (ref 0–0.03)
IMM GRANULOCYTES NFR BLD: 0.3 % (ref 0–0.6)
LYMPHOCYTES # BLD AUTO: 1.94 10*3/MM3 (ref 0.6–4.8)
LYMPHOCYTES NFR BLD AUTO: 26.9 % (ref 24–44)
MCH RBC QN AUTO: 28 PG (ref 27–31)
MCHC RBC AUTO-ENTMCNC: 32.1 G/DL (ref 32–36)
MCV RBC AUTO: 87.2 FL (ref 80–99)
MONOCYTES # BLD AUTO: 0.49 10*3/MM3 (ref 0–1)
MONOCYTES NFR BLD AUTO: 6.8 % (ref 0–12)
NEUTROPHILS # BLD AUTO: 4.63 10*3/MM3 (ref 1.5–8.3)
NEUTROPHILS NFR BLD AUTO: 64.3 % (ref 41–71)
PLATELET # BLD AUTO: 300 10*3/MM3 (ref 150–450)
PMV BLD AUTO: 10.4 FL (ref 6–12)
POTASSIUM BLD-SCNC: 4.6 MMOL/L (ref 3.5–5.5)
PROT SERPL-MCNC: 7.6 G/DL (ref 5.7–8.2)
RBC # BLD AUTO: 4.61 10*6/MM3 (ref 3.89–5.14)
SODIUM BLD-SCNC: 141 MMOL/L (ref 132–146)
TRIGL SERPL-MCNC: 74 MG/DL (ref 0–150)
WBC NRBC COR # BLD: 7.2 10*3/MM3 (ref 3.5–10.8)

## 2018-03-16 PROCEDURE — 80061 LIPID PANEL: CPT | Performed by: PHYSICIAN ASSISTANT

## 2018-03-16 PROCEDURE — 80053 COMPREHEN METABOLIC PANEL: CPT | Performed by: PHYSICIAN ASSISTANT

## 2018-03-16 PROCEDURE — G0439 PPPS, SUBSEQ VISIT: HCPCS | Performed by: PHYSICIAN ASSISTANT

## 2018-03-16 PROCEDURE — 85025 COMPLETE CBC W/AUTO DIFF WBC: CPT | Performed by: PHYSICIAN ASSISTANT

## 2018-03-16 NOTE — PROGRESS NOTES
QUICK REFERENCE INFORMATION:  The ABCs of the Annual Wellness Visit    Subsequent Medicare Wellness Visit    HEALTH RISK ASSESSMENT    1945    Recent Hospitalizations:  No hospitalization(s) within the last year..        Current Medical Providers:  Patient Care Team:  BISHNU Lowe as PCP - General (Internal Medicine)  BISHNU Lowe as PCP - Claims Attributed        Smoking Status:  History   Smoking Status   • Former Smoker   Smokeless Tobacco   • Never Used       Alcohol Consumption:  History   Alcohol Use   • Yes       Depression Screen:   PHQ-2/PHQ-9 Depression Screening 3/16/2018   Little interest or pleasure in doing things 0   Feeling down, depressed, or hopeless 0   Trouble falling or staying asleep, or sleeping too much -   Feeling tired or having little energy -   Poor appetite or overeating -   Feeling bad about yourself - or that you are a failure or have let yourself or your family down -   Trouble concentrating on things, such as reading the newspaper or watching television -   Moving or speaking so slowly that other people could have noticed. Or the opposite - being so fidgety or restless that you have been moving around a lot more than usual -   Total Score 0   If you checked off any problems, how difficult have these problems made it for you to do your work, take care of things at home, or get along with other people? -       Health Habits and Functional and Cognitive Screening:  Functional & Cognitive Status 3/16/2018   Do you have difficulty preparing food and eating? No   Do you have difficulty bathing yourself, getting dressed or grooming yourself? No   Do you have difficulty using the toilet? No   Do you have difficulty moving around from place to place? No   Do you have trouble with steps or getting out of a bed or a chair? No   In the past year have you fallen or experienced a near fall? No   Current Diet Well Balanced Diet   Dental Exam Not up to date   Eye Exam Up to date    Exercise (times per week) 0 times per week   Current Exercise Activities Include None   Do you need help using the phone?  No   Are you deaf or do you have serious difficulty hearing?  No   Do you need help with transportation? No   Do you need help shopping? No   Do you need help preparing meals?  No   Do you need help with housework?  No   Do you need help with laundry? No   Do you need help taking your medications? No   Do you need help managing money? No   Have you felt unusual stress, anger or loneliness in the last month? No   Who do you live with? Spouse   If you need help, do you have trouble finding someone available to you? No   Have you been bothered in the last four weeks by sexual problems? No   Do you have difficulty concentrating, remembering or making decisions? No           Does the patient have evidence of cognitive impairment? No    Aspirin use counseling: Taking ASA appropriately as indicated      Recent Lab Results:  CMP:  Lab Results   Component Value Date    BUN 23 03/16/2018    CREATININE 0.60 03/16/2018    EGFRIFNONA 99 03/16/2018    BCR 38.3 (H) 03/16/2018     03/16/2018    K 4.6 03/16/2018    CO2 28.0 03/16/2018    CALCIUM 9.4 03/16/2018    ALBUMIN 4.40 03/16/2018    LABIL2 1.4 (L) 03/16/2018    BILITOT 0.6 03/16/2018    ALKPHOS 87 03/16/2018    AST 24 03/16/2018    ALT 25 03/16/2018     Lipid Panel:  Lab Results   Component Value Date    CHOL 226 (H) 03/16/2018    TRIG 74 03/16/2018    HDL 69 (H) 03/16/2018     HbA1c:  Lab Results   Component Value Date    HGBA1C 6.1 01/05/2018       Visual Acuity:  No exam data present    Age-appropriate Screening Schedule:  Refer to the list below for future screening recommendations based on patient's age, sex and/or medical conditions. Orders for these recommended tests are listed in the plan section. The patient has been provided with a written plan.    Health Maintenance   Topic Date Due   • PNEUMOCOCCAL VACCINES (65+ LOW/MEDIUM RISK) (2 of  2 - PPSV23) 10/01/2016   • DXA SCAN  11/20/2017   • LIPID PANEL  03/16/2019   • MAMMOGRAM  01/10/2020   • TDAP/TD VACCINES (2 - Td) 06/01/2024   • COLONOSCOPY  11/02/2025   • INFLUENZA VACCINE  Completed   • ZOSTER VACCINE  Completed        Subjective   History of Present Illness    Mariaelena Easley is a 72 y.o. female who presents for an Subsequent Wellness Visit.    The following portions of the patient's history were reviewed and updated as appropriate: allergies, current medications, past family history, past medical history, past social history, past surgical history and problem list.    Outpatient Medications Prior to Visit   Medication Sig Dispense Refill   • amLODIPine (NORVASC) 5 MG tablet Take 1.5 tablets by mouth Daily. 135 tablet 1   • aspirin (ASPIRIN LOW DOSE) 81 MG chewable tablet Chew daily.     • Cholecalciferol (VITAMIN D3) 5000 UNIT/ML liquid Take  by mouth.     • melatonin 5 MG tablet tablet Take 5 mg by mouth.     • pravastatin (PRAVACHOL) 40 MG tablet Take 1 tablet by mouth Every Night. 90 tablet 0   • promethazine (PHENERGAN) 25 MG tablet Take 1 tablet by mouth Every 6 (Six) Hours As Needed for Nausea or Vomiting. 20 tablet 0   • rizatriptan MLT (MAXALT-MLT) 10 MG disintegrating tablet Take 1 tablet by mouth 1 (One) Time As Needed for Migraine for up to 1 dose. May repeat in 2 hours if needed 9 tablet 5   • vitamin B-12 (CYANOCOBALAMIN) 1000 MCG tablet Take 1,000 mcg by mouth daily.       No facility-administered medications prior to visit.        Patient Active Problem List   Diagnosis   • Anemia   • Hyperlipidemia   • Migraine headache   • Thyroid disease   • Episodic recurrent vertigo   • Osteopenia   • Seborrheic eczema   • Cyst of uterus   • Cyst of breast   • Functional diarrhea   • Essential hypertension   • Prediabetes       Advance Care Planning:  has an advance directive - a copy HAS NOT been provided    Identification of Risk Factors:  Risk factors include: weight , unhealthy diet,  cardiovascular risk and inactivity.    Review of Systems   Constitutional: Negative for activity change, appetite change and fatigue.   HENT: Negative for congestion and rhinorrhea.    Respiratory: Negative for chest tightness and shortness of breath.    Cardiovascular: Negative for chest pain and palpitations.   Gastrointestinal: Negative for abdominal pain.   Genitourinary: Negative for dysuria.   Musculoskeletal: Negative for arthralgias and myalgias.   Neurological: Negative for dizziness, weakness, light-headedness and headaches.   Psychiatric/Behavioral: Negative for dysphoric mood. The patient is not nervous/anxious.        Compared to one year ago, the patient feels her physical health is the same.  Compared to one year ago, the patient feels her mental health is the same.    Objective     Physical Exam   Constitutional: She appears well-developed and well-nourished.   HENT:   Head: Normocephalic.   Right Ear: Hearing, tympanic membrane, external ear and ear canal normal.   Left Ear: Hearing, tympanic membrane, external ear and ear canal normal.   Nose: Nose normal.   Mouth/Throat: Oropharynx is clear and moist.   Eyes: Conjunctivae are normal. Pupils are equal, round, and reactive to light.   Neck: Normal range of motion.   Cardiovascular: Normal rate, regular rhythm and normal heart sounds.    Pulmonary/Chest: Effort normal and breath sounds normal. She has no decreased breath sounds. She has no wheezes. She has no rhonchi. She has no rales.   Musculoskeletal: Normal range of motion.   Neurological: She is alert.   Skin: Skin is warm and dry.   Psychiatric: She has a normal mood and affect. Her behavior is normal.   Nursing note and vitals reviewed.      Vitals:    03/16/18 1050 03/16/18 2115   BP:  122/74   Pulse: 68    SpO2: 98%    Weight: 78.9 kg (174 lb)        Body mass index is 29.89 kg/m².  Discussed the patient's BMI with her. BMI is above normal parameters. Follow-up plan includes:  exercise  counseling and nutrition counseling.    Assessment/Plan   Patient Self-Management and Personalized Health Advice  The patient has been provided with information about: diet, exercise, weight management and prevention of cardiac or vascular disease and preventive services including:   · Bone densitometry screening, Pneumococcal vaccine .    Visit Diagnoses:    ICD-10-CM ICD-9-CM   1. Encounter for Medicare annual wellness exam Z00.00 V70.0   2. Post-menopausal Z78.0 V49.81   3. Hyperlipidemia, unspecified hyperlipidemia type E78.5 272.4   4. Essential hypertension I10 401.9   5. Anemia, unspecified type D64.9 285.9       Orders Placed This Encounter   Procedures   • DEXA Bone Density Axial     Standing Status:   Future     Standing Expiration Date:   3/17/2019     Order Specific Question:   Reason for Exam:     Answer:   post menopausal screening   • Comprehensive Metabolic Panel   • Lipid Panel   • CBC Auto Differential   • CBC & Differential     Order Specific Question:   Manual Differential     Answer:   No       Outpatient Encounter Prescriptions as of 3/16/2018   Medication Sig Dispense Refill   • amLODIPine (NORVASC) 5 MG tablet Take 1.5 tablets by mouth Daily. 135 tablet 1   • aspirin (ASPIRIN LOW DOSE) 81 MG chewable tablet Chew daily.     • Cholecalciferol (VITAMIN D3) 5000 UNIT/ML liquid Take  by mouth.     • melatonin 5 MG tablet tablet Take 5 mg by mouth.     • pravastatin (PRAVACHOL) 40 MG tablet Take 1 tablet by mouth Every Night. 90 tablet 0   • promethazine (PHENERGAN) 25 MG tablet Take 1 tablet by mouth Every 6 (Six) Hours As Needed for Nausea or Vomiting. 20 tablet 0   • rizatriptan MLT (MAXALT-MLT) 10 MG disintegrating tablet Take 1 tablet by mouth 1 (One) Time As Needed for Migraine for up to 1 dose. May repeat in 2 hours if needed 9 tablet 5   • vitamin B-12 (CYANOCOBALAMIN) 1000 MCG tablet Take 1,000 mcg by mouth daily.       No facility-administered encounter medications on file as of  3/16/2018.        Reviewed use of high risk medication in the elderly: yes  Reviewed for potential of harmful drug interactions in the elderly: yes    Follow Up:  Return in about 3 months (around 6/16/2018) for Recheck, Medicare Wellness in 1 year.     An After Visit Summary and PPPS with all of these plans were given to the patient.

## 2018-03-17 VITALS
SYSTOLIC BLOOD PRESSURE: 122 MMHG | OXYGEN SATURATION: 98 % | WEIGHT: 174 LBS | BODY MASS INDEX: 29.89 KG/M2 | HEART RATE: 68 BPM | DIASTOLIC BLOOD PRESSURE: 74 MMHG

## 2018-03-26 ENCOUNTER — HOSPITAL ENCOUNTER (OUTPATIENT)
Dept: BONE DENSITY | Facility: HOSPITAL | Age: 73
Discharge: HOME | End: 2018-03-26
Admitting: PHYSICIAN ASSISTANT

## 2018-03-26 ENCOUNTER — OFFICE VISIT (OUTPATIENT)
Dept: CARDIOLOGY | Facility: CLINIC | Age: 73
End: 2018-03-26

## 2018-03-26 VITALS
SYSTOLIC BLOOD PRESSURE: 140 MMHG | DIASTOLIC BLOOD PRESSURE: 82 MMHG | HEART RATE: 71 BPM | HEIGHT: 64 IN | BODY MASS INDEX: 30.56 KG/M2 | WEIGHT: 179 LBS

## 2018-03-26 DIAGNOSIS — Z78.0 POST-MENOPAUSAL: ICD-10-CM

## 2018-03-26 DIAGNOSIS — I10 ESSENTIAL HYPERTENSION: ICD-10-CM

## 2018-03-26 DIAGNOSIS — E78.5 HYPERLIPIDEMIA, UNSPECIFIED HYPERLIPIDEMIA TYPE: ICD-10-CM

## 2018-03-26 DIAGNOSIS — I25.119 CORONARY ARTERY DISEASE INVOLVING NATIVE CORONARY ARTERY OF NATIVE HEART WITH ANGINA PECTORIS (HCC): Primary | ICD-10-CM

## 2018-03-26 PROCEDURE — 99204 OFFICE O/P NEW MOD 45 MIN: CPT | Performed by: INTERNAL MEDICINE

## 2018-03-26 PROCEDURE — 93000 ELECTROCARDIOGRAM COMPLETE: CPT | Performed by: INTERNAL MEDICINE

## 2018-03-26 PROCEDURE — 77080 DXA BONE DENSITY AXIAL: CPT

## 2018-03-26 RX ORDER — PRAVASTATIN SODIUM 80 MG/1
80 TABLET ORAL NIGHTLY
Qty: 90 TABLET | Refills: 3 | Status: SHIPPED | OUTPATIENT
Start: 2018-03-26 | End: 2018-03-26 | Stop reason: ALTCHOICE

## 2018-03-26 RX ORDER — AA/PROT/LYSINE/METHIO/VIT C/B6 50-12.5 MG
5 TABLET ORAL DAILY
COMMUNITY

## 2018-03-26 RX ORDER — ATORVASTATIN CALCIUM 80 MG/1
80 TABLET, FILM COATED ORAL DAILY
Qty: 30 TABLET | Refills: 11 | Status: SHIPPED | OUTPATIENT
Start: 2018-03-26 | End: 2019-03-13 | Stop reason: SDUPTHER

## 2018-03-26 NOTE — PROGRESS NOTES
Subjective:     Encounter Date:2018    Primary Care Physician: BISHNU Bell      Patient ID: Mariaelena Easley is a 72 y.o. female.    Chief Complaint:abnormal stress test; Chest Pain; Hyperlipidemia; and Hypertension    PROBLEM LIST:  1. Chest pain  a. Myocardial perfusion study 18 EF of 66% very small sized, mildly severe area of ischemia in the lateral wall.  Low risk study. Read by Dr. Buckley.  2. Hypertension  3. Dyslipidemia  4. Borderline diabetes  5. Eczema  6. Surgeries:  a. Bilateral breast augmentation  b. Right breast biopsy  c.  section      Allergies   Allergen Reactions   • Codeine    • Sulfa Antibiotics          Current Outpatient Prescriptions:   •  amLODIPine (NORVASC) 5 MG tablet, Take 1.5 tablets by mouth Daily., Disp: 135 tablet, Rfl: 1  •  aspirin (ASPIRIN LOW DOSE) 81 MG chewable tablet, Chew 81 mg Daily., Disp: , Rfl:   •  Biotin w/ Vitamins C & E (HAIR SKIN & NAILS GUMMIES PO), Take 2 tablets by mouth Daily., Disp: , Rfl:   •  Calcium 500-100 MG-UNIT chewable tablet, Chew 1 tablet Every 4 (Four) Hours As Needed., Disp: , Rfl:   •  Coenzyme Q10 30 MG/5ML liquid, Take  by mouth Daily., Disp: , Rfl:   •  pravastatin (PRAVACHOL) 80 MG tablet, Take 1 tablet by mouth Every Night., Disp: 90 tablet, Rfl: 3  •  promethazine (PHENERGAN) 25 MG tablet, Take 1 tablet by mouth Every 6 (Six) Hours As Needed for Nausea or Vomiting., Disp: 20 tablet, Rfl: 0  •  rizatriptan MLT (MAXALT-MLT) 10 MG disintegrating tablet, Take 1 tablet by mouth 1 (One) Time As Needed for Migraine for up to 1 dose. May repeat in 2 hours if needed, Disp: 9 tablet, Rfl: 5  •  Vitamins A & D (VITAMIN A & D PO), Take 2 tablets by mouth Daily., Disp: , Rfl:         History of Present Illness    Patient is a 72-year-old  female who we are seeing today for further evaluation of chest pain and mildly abnormal myocardial perfusion study.  She notes that a month or 2 ago she experienced an episode of  left-sided chest discomfort after eating at a restaurant.  It lasted for 1-2 minutes and then subsided.  Was not brought on with any exertional activity.  Since that time she has not had any further episodes.  Denies any increasing shortness of breath.  Denies any syncope, near-syncope, or edema.  Her stress test that was performed was read as mildly abnormal and she was sent here for further evaluation.  She does note that during her post images she did have some movement.    The following portions of the patient's history were reviewed and updated as appropriate: allergies, current medications, past family history, past medical history, past social history, past surgical history and problem list.    Family History   Problem Relation Age of Onset   • Heart disease Mother    • Arthritis Father    • Heart attack Father    • Skin cancer Father    • COPD Father    • Lung cancer Sister    • Myelodysplastic syndrome Sister    • Diabetes Brother    • Heart attack Brother    • Hyperlipidemia Brother    • Breast cancer Neg Hx    • Ovarian cancer Neg Hx        Social History   Substance Use Topics   • Smoking status: Former Smoker   • Smokeless tobacco: Never Used   • Alcohol use Yes         Review of Systems   Constitution: Negative for fever, weakness and malaise/fatigue.   HENT: Negative for nosebleeds.    Eyes: Negative for redness and visual disturbance.   Cardiovascular: Negative for orthopnea, palpitations and paroxysmal nocturnal dyspnea.   Respiratory: Negative for cough, snoring, sputum production and wheezing.    Hematologic/Lymphatic: Negative for bleeding problem.   Skin: Negative for flushing, itching and rash.   Musculoskeletal: Negative for falls, joint pain and muscle cramps.   Gastrointestinal: Negative for abdominal pain, diarrhea, heartburn, nausea and vomiting.   Genitourinary: Negative for hematuria.   Neurological: Negative for excessive daytime sleepiness, dizziness, headaches and tremors.  "  Psychiatric/Behavioral: Negative for substance abuse. The patient is not nervous/anxious.           Objective:   /82 (BP Location: Right arm, Patient Position: Sitting)   Pulse 71   Ht 162.6 cm (64\")   Wt 81.2 kg (179 lb)   LMP  (LMP Unknown)   BMI 30.73 kg/m²           Physical Exam   Constitutional: She is oriented to person, place, and time. She appears well-developed and well-nourished.   HENT:   Head: Normocephalic and atraumatic.   Mouth/Throat: Oropharynx is clear and moist.   Eyes: Conjunctivae are normal. Pupils are equal, round, and reactive to light.   Neck: Normal carotid pulses and no JVD present. Carotid bruit is not present. No thyromegaly present.   Cardiovascular: Normal rate, regular rhythm, S1 normal and S2 normal.  Exam reveals no gallop and no friction rub.    No murmur heard.  Pulses:       Carotid pulses are 2+ on the right side, and 2+ on the left side.       Dorsalis pedis pulses are 2+ on the right side, and 2+ on the left side.        Posterior tibial pulses are 2+ on the right side, and 2+ on the left side.   Pulmonary/Chest: No respiratory distress. She has no wheezes. She has no rales. She exhibits no tenderness.   Abdominal: She exhibits no distension, no abdominal bruit and no mass. There is no hepatosplenomegaly. There is no tenderness. There is no rebound.   Musculoskeletal: She exhibits no edema, tenderness or deformity.   Lymphadenopathy:     She has no cervical adenopathy.   Neurological: She is alert and oriented to person, place, and time. She has normal strength.   Skin: Skin is warm and dry. No rash noted. No cyanosis. Nails show no clubbing.   Psychiatric: She has a normal mood and affect. Cognition and memory are normal.         ECG 12 Lead  Date/Time: 3/26/2018 2:47 PM  Performed by: SHELDON CANNON  Authorized by: SHELDON CANNON   Rhythm: sinus rhythm  Comments: Diffuse nonspecific T flattening                  Assessment:   Assessment/Plan      Mariaelena was " seen today for abnormal stress test, chest pain, hyperlipidemia and hypertension.    Diagnoses and all orders for this visit:    Coronary artery disease involving native coronary artery of native heart with angina pectoris    Hyperlipidemia, unspecified hyperlipidemia type    Essential hypertension    Other orders  -     atorvastatin (LIPITOR) 80 MG tablet; Take 1 tablet by mouth Daily.  -     ECG 12 Lead      1.  Coronary artery disease, with multiple risk factors, and borderline/low risk perfusion study.  No clear angina.  Functional class II dyspnea.  2.  Hypertension, controlled  3.  Dyslipidemia with LDL on pravastatin of 154.     Recognition score had discussion with patient at length.  She needs more aggressive lipid-lowering, and we'll put her back on a high dose high intensity statin, atorvastatin 40 mg daily at bedtime.  She was worried about elevated blood sugars with these, we are sure her that despite this, there is a clear mortality benefit with high intensity statin.  Other options would be Crestor 20 mg.  Regarding her stress test, as it is low risk, and she has good functional capacity.  Discussed options of invasive versus conservative management.  We have chosen conservative management.  She is to exercise, and if she develops progressive exertional symptoms she will contact her office for her to consider heart catheter at this time.  In the meantime we'll manage her medically with aspirin, high intensity statin, and if blood pressure allows of beta blocker (will defer this to primary care physician given the fact she has been on multiple blood pressure medicines in the last several months, the patient does not wish once however).  We'll revisit her in 6 months time we see her .  Stella MILLER scribed portions of this dictation for  Dr. Mcdaniel    Dictated utilizing Dragon dictation

## 2018-04-03 ENCOUNTER — TELEPHONE (OUTPATIENT)
Dept: INTERNAL MEDICINE | Facility: CLINIC | Age: 73
End: 2018-04-03

## 2018-04-03 NOTE — TELEPHONE ENCOUNTER
Called [facility] and cancelled prescription for pravastatin 80 per pt's request.   See message below, FYI.

## 2018-04-03 NOTE — TELEPHONE ENCOUNTER
PT REFERRED TO Psychiatric CARDIOLOGIST TO SEE DR. CANNON. HE PUT PT BACK ON GENERIC FOR LIPITOR 80MG. [facility]OB[facility] LEVI CHANGED PROVASTATIN FROM 40 TO 80MG. THE PT WAS NEEDING TO CANCEL PROVASTATIN PRESCRIPTION THROUGH [facility]. WANTED TO DOUBLE CHECK IF [facility] GURMEET RECEIVED INFORMATION FROM DR. CANNON IN REGARDS TO MEDICATION CHANGE. BEST CALL BACK # [phone]

## 2018-05-31 ENCOUNTER — TELEPHONE (OUTPATIENT)
Dept: INTERNAL MEDICINE | Facility: CLINIC | Age: 73
End: 2018-05-31

## 2018-05-31 ENCOUNTER — OFFICE VISIT (OUTPATIENT)
Dept: INTERNAL MEDICINE | Facility: CLINIC | Age: 73
End: 2018-05-31

## 2018-05-31 VITALS
BODY MASS INDEX: 29.52 KG/M2 | DIASTOLIC BLOOD PRESSURE: 70 MMHG | HEART RATE: 65 BPM | WEIGHT: 172 LBS | SYSTOLIC BLOOD PRESSURE: 128 MMHG | OXYGEN SATURATION: 98 %

## 2018-05-31 DIAGNOSIS — M79.89 SWELLING OF LOWER EXTREMITY: Primary | ICD-10-CM

## 2018-05-31 DIAGNOSIS — E78.5 HYPERLIPIDEMIA, UNSPECIFIED HYPERLIPIDEMIA TYPE: ICD-10-CM

## 2018-05-31 LAB
ALBUMIN SERPL-MCNC: 4.6 G/DL (ref 3.2–4.8)
ALBUMIN/GLOB SERPL: 1.5 G/DL (ref 1.5–2.5)
ALP SERPL-CCNC: 94 U/L (ref 25–100)
ALT SERPL W P-5'-P-CCNC: 25 U/L (ref 7–40)
ANION GAP SERPL CALCULATED.3IONS-SCNC: 8 MMOL/L (ref 3–11)
ARTICHOKE IGE QN: 96 MG/DL (ref 0–130)
AST SERPL-CCNC: 24 U/L (ref 0–33)
BILIRUB SERPL-MCNC: 0.6 MG/DL (ref 0.3–1.2)
BUN BLD-MCNC: 13 MG/DL (ref 9–23)
BUN/CREAT SERPL: 16.3 (ref 7–25)
CALCIUM SPEC-SCNC: 9.7 MG/DL (ref 8.7–10.4)
CHLORIDE SERPL-SCNC: 103 MMOL/L (ref 99–109)
CHOLEST SERPL-MCNC: 173 MG/DL (ref 0–200)
CO2 SERPL-SCNC: 29 MMOL/L (ref 20–31)
CREAT BLD-MCNC: 0.8 MG/DL (ref 0.6–1.3)
GFR SERPL CREATININE-BSD FRML MDRD: 71 ML/MIN/1.73
GLOBULIN UR ELPH-MCNC: 3 GM/DL
GLUCOSE BLD-MCNC: 140 MG/DL (ref 70–100)
HDLC SERPL-MCNC: 66 MG/DL (ref 40–60)
POTASSIUM BLD-SCNC: 4.5 MMOL/L (ref 3.5–5.5)
PROT SERPL-MCNC: 7.6 G/DL (ref 5.7–8.2)
SODIUM BLD-SCNC: 140 MMOL/L (ref 132–146)
TRIGL SERPL-MCNC: 64 MG/DL (ref 0–150)

## 2018-05-31 PROCEDURE — 99213 OFFICE O/P EST LOW 20 MIN: CPT | Performed by: PHYSICIAN ASSISTANT

## 2018-05-31 PROCEDURE — 80061 LIPID PANEL: CPT | Performed by: PHYSICIAN ASSISTANT

## 2018-05-31 PROCEDURE — 80053 COMPREHEN METABOLIC PANEL: CPT | Performed by: PHYSICIAN ASSISTANT

## 2018-06-18 ENCOUNTER — OFFICE VISIT (OUTPATIENT)
Dept: INTERNAL MEDICINE | Facility: CLINIC | Age: 73
End: 2018-06-18

## 2018-06-18 VITALS
OXYGEN SATURATION: 96 % | HEART RATE: 60 BPM | DIASTOLIC BLOOD PRESSURE: 80 MMHG | SYSTOLIC BLOOD PRESSURE: 130 MMHG | WEIGHT: 170.5 LBS | BODY MASS INDEX: 29.27 KG/M2

## 2018-06-18 DIAGNOSIS — I10 ESSENTIAL HYPERTENSION: ICD-10-CM

## 2018-06-18 DIAGNOSIS — E11.9 TYPE 2 DIABETES MELLITUS WITHOUT COMPLICATION, WITHOUT LONG-TERM CURRENT USE OF INSULIN (HCC): Primary | ICD-10-CM

## 2018-06-18 LAB
GLUCOSE BLDC GLUCOMTR-MCNC: 141 MG/DL (ref 70–130)
HBA1C MFR BLD: 6.6 %

## 2018-06-18 PROCEDURE — 99213 OFFICE O/P EST LOW 20 MIN: CPT | Performed by: PHYSICIAN ASSISTANT

## 2018-06-18 PROCEDURE — 83036 HEMOGLOBIN GLYCOSYLATED A1C: CPT | Performed by: PHYSICIAN ASSISTANT

## 2018-06-18 PROCEDURE — 82947 ASSAY GLUCOSE BLOOD QUANT: CPT | Performed by: PHYSICIAN ASSISTANT

## 2018-06-18 RX ORDER — LANCETS 33 GAUGE
EACH MISCELLANEOUS
Qty: 100 EACH | Refills: 3 | Status: SHIPPED | OUTPATIENT
Start: 2018-06-18 | End: 2020-04-09 | Stop reason: SDUPTHER

## 2018-06-18 RX ORDER — METFORMIN HYDROCHLORIDE 500 MG/1
500 TABLET, EXTENDED RELEASE ORAL
Qty: 90 TABLET | Refills: 3 | Status: SHIPPED | OUTPATIENT
Start: 2018-06-18 | End: 2019-04-25 | Stop reason: SDUPTHER

## 2018-06-18 RX ORDER — AMLODIPINE BESYLATE 5 MG/1
5 TABLET ORAL DAILY
Qty: 90 TABLET | Refills: 1 | Status: SHIPPED | OUTPATIENT
Start: 2018-06-18 | End: 2018-12-10 | Stop reason: SDUPTHER

## 2018-06-18 NOTE — PROGRESS NOTES
Chief Complaint   Patient presents with   • Follow-up     3 month   • Hyperlipidemia       Subjective   Mariaelena Easley is a 73 y.o. female.       History of Present Illness     Pt has had improvement with the swelling in her right lower extremity, stopped the extra 2.5 mg of amlodipine. No problems with lower dose.    Ran out of strips for her meter. BS has been running above 125 at times. She had elevated glucose with statins in the past but cardiologist wants to be more aggressive with her lipids. She has not been watching carbs- did diabetes training in the past with her .        Current Outpatient Prescriptions:   •  amLODIPine (NORVASC) 5 MG tablet, Take 1 tablet by mouth Daily., Disp: 90 tablet, Rfl: 1  •  aspirin (ASPIRIN LOW DOSE) 81 MG chewable tablet, Chew 81 mg Daily., Disp: , Rfl:   •  atorvastatin (LIPITOR) 80 MG tablet, Take 1 tablet by mouth Daily., Disp: 30 tablet, Rfl: 11  •  Biotin w/ Vitamins C & E (HAIR SKIN & NAILS GUMMIES PO), Take 2 tablets by mouth Daily., Disp: , Rfl:   •  Calcium 500-100 MG-UNIT chewable tablet, Chew 1 tablet Every 4 (Four) Hours As Needed., Disp: , Rfl:   •  Coenzyme Q10 30 MG/5ML liquid, Take  by mouth Daily., Disp: , Rfl:   •  promethazine (PHENERGAN) 25 MG tablet, Take 1 tablet by mouth Every 6 (Six) Hours As Needed for Nausea or Vomiting., Disp: 20 tablet, Rfl: 0  •  rizatriptan MLT (MAXALT-MLT) 10 MG disintegrating tablet, Take 1 tablet by mouth 1 (One) Time As Needed for Migraine for up to 1 dose. May repeat in 2 hours if needed, Disp: 9 tablet, Rfl: 5  •  Vitamins A & D (VITAMIN A & D PO), Take 2 tablets by mouth Daily., Disp: , Rfl:   •  glucose blood (ONE TOUCH ULTRA TEST) test strip, Use as directed once daily as needed, Disp: 100 each, Rfl: 12  •  metFORMIN ER (GLUCOPHAGE-XR) 500 MG 24 hr tablet, Take 1 tablet by mouth Daily With Breakfast., Disp: 90 tablet, Rfl: 3  •  ONETOUCH DELICA LANCETS 33G misc, Use as directed once daily as needed., Disp: 100  each, Rfl: 3     PMFSH  The following portions of the patient's history were reviewed and updated as appropriate: allergies, current medications, past family history, past medical history, past social history, past surgical history and problem list.    Review of Systems   Constitutional: Negative for activity change, appetite change and fatigue.   HENT: Negative for congestion and rhinorrhea.    Respiratory: Negative for chest tightness and shortness of breath.    Cardiovascular: Negative for chest pain and palpitations.   Gastrointestinal: Negative for abdominal pain.   Genitourinary: Negative for dysuria.   Musculoskeletal: Negative for arthralgias and myalgias.   Neurological: Negative for dizziness, weakness, light-headedness and headaches.   Psychiatric/Behavioral: Negative for dysphoric mood. The patient is not nervous/anxious.        Objective   /80   Pulse 60   Wt 77.3 kg (170 lb 8 oz)   LMP  (LMP Unknown)   SpO2 96%   BMI 29.27 kg/m²     Physical Exam   Constitutional: She appears well-developed and well-nourished.   HENT:   Head: Normocephalic.   Right Ear: Hearing, tympanic membrane, external ear and ear canal normal.   Left Ear: Hearing, tympanic membrane, external ear and ear canal normal.   Nose: Nose normal.   Mouth/Throat: Oropharynx is clear and moist.   Eyes: Conjunctivae are normal. Pupils are equal, round, and reactive to light.   Neck: Normal range of motion.   Cardiovascular: Normal rate, regular rhythm and normal heart sounds.    Pulmonary/Chest: Effort normal and breath sounds normal. She has no decreased breath sounds. She has no wheezes. She has no rhonchi. She has no rales.   Musculoskeletal: Normal range of motion.   Neurological: She is alert.   Skin: Skin is warm and dry.   Psychiatric: She has a normal mood and affect. Her behavior is normal.   Nursing note and vitals reviewed.      Results for orders placed or performed in visit on 06/18/18   POC Glycosylated Hemoglobin (Hb  A1C)   Result Value Ref Range    Hemoglobin A1C 6.6 %   POCT Glucose   Result Value Ref Range    Glucose 141 (A) 70 - 130 mg/dL        ASSESSMENT/PLAN    Problem List Items Addressed This Visit        Cardiovascular and Mediastinum    Essential hypertension     Hypertension is unchanged.  Continue current treatment regimen.  Blood pressure will be reassessed in 3 months.         Relevant Medications    amLODIPine (NORVASC) 5 MG tablet       Endocrine    Diabetes mellitus - Primary     Diabetes is newly identified.   Dietary recommendations for ADA diet.  Regular aerobic exercise.  Medication changes per orders. Start metformin  mg daily.  Diabetes will be reassessed in 3 months.         Relevant Medications    ONETOUCH DELICA LANCETS 33G misc    glucose blood (ONE TOUCH ULTRA TEST) test strip    metFORMIN ER (GLUCOPHAGE-XR) 500 MG 24 hr tablet    Other Relevant Orders    POC Glycosylated Hemoglobin (Hb A1C) (Completed)    POCT Glucose (Completed)               Return in about 3 months (around 9/18/2018) for Recheck.

## 2018-06-18 NOTE — ASSESSMENT & PLAN NOTE
Hypertension is unchanged.  Continue current treatment regimen.  Blood pressure will be reassessed in 3 months.

## 2018-06-18 NOTE — ASSESSMENT & PLAN NOTE
Diabetes is newly identified.   Dietary recommendations for ADA diet.  Regular aerobic exercise.  Medication changes per orders. Start metformin  mg daily.  Diabetes will be reassessed in 3 months.

## 2018-10-02 ENCOUNTER — OFFICE VISIT (OUTPATIENT)
Dept: INTERNAL MEDICINE | Facility: CLINIC | Age: 73
End: 2018-10-02

## 2018-10-02 VITALS
OXYGEN SATURATION: 98 % | BODY MASS INDEX: 29.53 KG/M2 | HEIGHT: 64 IN | SYSTOLIC BLOOD PRESSURE: 164 MMHG | HEART RATE: 75 BPM | DIASTOLIC BLOOD PRESSURE: 78 MMHG | WEIGHT: 173 LBS

## 2018-10-02 DIAGNOSIS — I10 ESSENTIAL HYPERTENSION: ICD-10-CM

## 2018-10-02 DIAGNOSIS — E11.9 TYPE 2 DIABETES MELLITUS WITHOUT COMPLICATION, WITHOUT LONG-TERM CURRENT USE OF INSULIN (HCC): Primary | ICD-10-CM

## 2018-10-02 DIAGNOSIS — E78.5 HYPERLIPIDEMIA, UNSPECIFIED HYPERLIPIDEMIA TYPE: ICD-10-CM

## 2018-10-02 LAB
GLUCOSE BLDC GLUCOMTR-MCNC: 137 MG/DL (ref 70–130)
HBA1C MFR BLD: 6.8 %

## 2018-10-02 PROCEDURE — 82962 GLUCOSE BLOOD TEST: CPT | Performed by: PHYSICIAN ASSISTANT

## 2018-10-02 PROCEDURE — G0008 ADMIN INFLUENZA VIRUS VAC: HCPCS | Performed by: PHYSICIAN ASSISTANT

## 2018-10-02 PROCEDURE — 90662 IIV NO PRSV INCREASED AG IM: CPT | Performed by: PHYSICIAN ASSISTANT

## 2018-10-02 PROCEDURE — 99214 OFFICE O/P EST MOD 30 MIN: CPT | Performed by: PHYSICIAN ASSISTANT

## 2018-10-02 PROCEDURE — 83036 HEMOGLOBIN GLYCOSYLATED A1C: CPT | Performed by: PHYSICIAN ASSISTANT

## 2018-10-02 RX ORDER — HYDROCHLOROTHIAZIDE 25 MG/1
25 TABLET ORAL DAILY
Qty: 30 TABLET | Refills: 3 | Status: SHIPPED | OUTPATIENT
Start: 2018-10-02 | End: 2018-12-10 | Stop reason: SDUPTHER

## 2018-10-02 RX ORDER — HYDROCHLOROTHIAZIDE 25 MG/1
25 TABLET ORAL DAILY
Qty: 30 TABLET | Refills: 3 | Status: SHIPPED | OUTPATIENT
Start: 2018-10-02 | End: 2018-10-02 | Stop reason: SDUPTHER

## 2018-10-02 NOTE — PROGRESS NOTES
Chief Complaint   Patient presents with   • Hypertension   • Type 2 diabetes mellitus     follow up       Subjective   Mariaelena Easley is a 73 y.o. female.       History of Present Illness     Pt's best blood sugar reading was 110 since she started monitoring. She is convinced that the atorvastatin 80 mg is contributing to her increased glucose- it has happened to her before. Hopeful that she can go back to 40 mg of lipitor. She has been taking metformin as directed    She noticed recently when talking to a niece she had not seen in a while that she could not get certain words out. Does not happen often, able to do her daily crossword without problem.    She does not check her blood pressure at home. Notes that she needs to get a home cuff. Thinks she is anxious about driving up here from [place].      Current Outpatient Prescriptions:   •  amLODIPine (NORVASC) 5 MG tablet, Take 1 tablet by mouth Daily., Disp: 90 tablet, Rfl: 1  •  aspirin (ASPIRIN LOW DOSE) 81 MG chewable tablet, Chew 81 mg Daily., Disp: , Rfl:   •  atorvastatin (LIPITOR) 80 MG tablet, Take 1 tablet by mouth Daily., Disp: 30 tablet, Rfl: 11  •  Biotin w/ Vitamins C & E (HAIR SKIN & NAILS GUMMIES PO), Take 2 tablets by mouth Daily., Disp: , Rfl:   •  Calcium 500-100 MG-UNIT chewable tablet, Chew 1 tablet Every 4 (Four) Hours As Needed., Disp: , Rfl:   •  Coenzyme Q10 30 MG/5ML liquid, Take  by mouth Daily., Disp: , Rfl:   •  metFORMIN ER (GLUCOPHAGE-XR) 500 MG 24 hr tablet, Take 1 tablet by mouth Daily With Breakfast., Disp: 90 tablet, Rfl: 3  •  ONETOUCH DELICA LANCETS 33G misc, Use as directed once daily as needed., Disp: 100 each, Rfl: 3  •  promethazine (PHENERGAN) 25 MG tablet, Take 1 tablet by mouth Every 6 (Six) Hours As Needed for Nausea or Vomiting., Disp: 20 tablet, Rfl: 0  •  rizatriptan MLT (MAXALT-MLT) 10 MG disintegrating tablet, Take 1 tablet by mouth 1 (One) Time As Needed for Migraine for up to 1 dose. May repeat in 2 hours if  "needed, Disp: 9 tablet, Rfl: 5  •  Vitamins A & D (VITAMIN A & D PO), Take 2 tablets by mouth Daily., Disp: , Rfl:   •  glucose blood (ONETOUCH VERIO) test strip, Use as instructed, Disp: 100 each, Rfl: 5  •  hydrochlorothiazide (HYDRODIURIL) 25 MG tablet, Take 1 tablet by mouth Daily., Disp: 30 tablet, Rfl: 3     PMFSH  The following portions of the patient's history were reviewed and updated as appropriate: allergies, current medications, past family history, past medical history, past social history, past surgical history and problem list.    Review of Systems   Constitutional: Negative for activity change, appetite change and fatigue.   HENT: Negative for congestion and rhinorrhea.    Respiratory: Negative for chest tightness and shortness of breath.    Cardiovascular: Negative for chest pain and palpitations.   Gastrointestinal: Negative for abdominal pain.   Genitourinary: Negative for dysuria.   Musculoskeletal: Negative for arthralgias and myalgias.   Neurological: Negative for dizziness, weakness, light-headedness and headaches.   Psychiatric/Behavioral: Negative for dysphoric mood. The patient is not nervous/anxious.        Objective   /78   Pulse 75   Ht 162.6 cm (64\")   Wt 78.5 kg (173 lb)   LMP  (LMP Unknown)   SpO2 98%   BMI 29.70 kg/m²     Physical Exam   Constitutional: She appears well-developed and well-nourished.   HENT:   Head: Normocephalic.   Right Ear: Hearing, tympanic membrane, external ear and ear canal normal.   Left Ear: Hearing, tympanic membrane, external ear and ear canal normal.   Nose: Nose normal.   Mouth/Throat: Oropharynx is clear and moist.   Eyes: Pupils are equal, round, and reactive to light. Conjunctivae are normal.   Neck: Normal range of motion.   Cardiovascular: Normal rate, regular rhythm and normal heart sounds.    Pulmonary/Chest: Effort normal and breath sounds normal. She has no decreased breath sounds. She has no wheezes. She has no rhonchi. She has no " rales.   Musculoskeletal: Normal range of motion.   Neurological: She is alert.   Skin: Skin is warm and dry.   Psychiatric: She has a normal mood and affect. Her behavior is normal.   Nursing note and vitals reviewed.      Results for orders placed or performed in visit on 10/02/18   POCT Glucose   Result Value Ref Range    Glucose 137 (A) 70 - 130 mg/dL   POC Glycosylated Hemoglobin (Hb A1C)   Result Value Ref Range    Hemoglobin A1C 6.8 %        ASSESSMENT/PLAN    Problem List Items Addressed This Visit        Cardiovascular and Mediastinum    Hyperlipidemia     Check lipid profile. Further recommendations based on results. Pt has pending f/u with cardiology. She will discuss reducing lipitor to 40 mg with him based on flp results.           Relevant Orders    Comprehensive Metabolic Panel    Lipid Panel    Essential hypertension     Hypertension is worsening.  Continue current treatment regimen.  Dietary sodium restriction.  Weight loss.  Regular aerobic exercise.  Medication changes per orders. Add hctz 25 mg daily.  Blood pressure will be reassessed in 3 months.         Relevant Medications    hydrochlorothiazide (HYDRODIURIL) 25 MG tablet       Endocrine    Diabetes mellitus (CMS/HCC) - Primary     Diabetes is worsening.   Continue current treatment regimen.  Reminded to bring in blood sugar diary at next visit.  Dietary recommendations for ADA diet.  Regular aerobic exercise. Discussed decreasing atorvastatin to 40 mg due to increasing glucose in spite of starting medication. Recheck lipid profile today.  Diabetes will be reassessed in 3 months.         Relevant Orders    POCT Glucose (Completed)    POC Glycosylated Hemoglobin (Hb A1C) (Completed)    Comprehensive Metabolic Panel    Lipid Panel               Return in about 3 months (around 1/2/2019) for Recheck.

## 2018-10-03 NOTE — ASSESSMENT & PLAN NOTE
Hypertension is worsening.  Continue current treatment regimen.  Dietary sodium restriction.  Weight loss.  Regular aerobic exercise.  Medication changes per orders. Add hctz 25 mg daily.  Blood pressure will be reassessed in 3 months.

## 2018-10-03 NOTE — ASSESSMENT & PLAN NOTE
Diabetes is worsening.   Continue current treatment regimen.  Reminded to bring in blood sugar diary at next visit.  Dietary recommendations for ADA diet.  Regular aerobic exercise. Discussed decreasing atorvastatin to 40 mg due to increasing glucose in spite of starting medication. Recheck lipid profile today.  Diabetes will be reassessed in 3 months.

## 2018-10-03 NOTE — ASSESSMENT & PLAN NOTE
Check lipid profile. Further recommendations based on results. Pt has pending f/u with cardiology. She will discuss reducing lipitor to 40 mg with him based on flp results.

## 2018-11-02 LAB
ALBUMIN SERPL-MCNC: 4.32 G/DL (ref 3.2–4.8)
ALBUMIN/GLOB SERPL: 1.6 G/DL (ref 1.5–2.5)
ALP SERPL-CCNC: 98 U/L (ref 25–100)
ALT SERPL W P-5'-P-CCNC: 25 U/L (ref 7–40)
ANION GAP SERPL CALCULATED.3IONS-SCNC: 4 MMOL/L (ref 3–11)
ARTICHOKE IGE QN: 93 MG/DL (ref 0–130)
AST SERPL-CCNC: 21 U/L (ref 0–33)
BILIRUB SERPL-MCNC: 0.8 MG/DL (ref 0.3–1.2)
BUN BLD-MCNC: 24 MG/DL (ref 9–23)
BUN/CREAT SERPL: 33.3 (ref 7–25)
CALCIUM SPEC-SCNC: 9.3 MG/DL (ref 8.7–10.4)
CHLORIDE SERPL-SCNC: 98 MMOL/L (ref 99–109)
CHOLEST SERPL-MCNC: 166 MG/DL (ref 0–200)
CO2 SERPL-SCNC: 32 MMOL/L (ref 20–31)
CREAT BLD-MCNC: 0.72 MG/DL (ref 0.6–1.3)
GFR SERPL CREATININE-BSD FRML MDRD: 80 ML/MIN/1.73
GLOBULIN UR ELPH-MCNC: 2.7 GM/DL
GLUCOSE BLD-MCNC: 122 MG/DL (ref 70–100)
HDLC SERPL-MCNC: 62 MG/DL (ref 40–60)
POTASSIUM BLD-SCNC: 3.8 MMOL/L (ref 3.5–5.5)
PROT SERPL-MCNC: 7 G/DL (ref 5.7–8.2)
SODIUM BLD-SCNC: 134 MMOL/L (ref 132–146)
TRIGL SERPL-MCNC: 81 MG/DL (ref 0–150)

## 2018-11-02 PROCEDURE — 80053 COMPREHEN METABOLIC PANEL: CPT | Performed by: PHYSICIAN ASSISTANT

## 2018-11-02 PROCEDURE — 80061 LIPID PANEL: CPT | Performed by: PHYSICIAN ASSISTANT

## 2018-11-12 ENCOUNTER — OFFICE VISIT (OUTPATIENT)
Dept: CARDIOLOGY | Facility: CLINIC | Age: 73
End: 2018-11-12

## 2018-11-12 VITALS
WEIGHT: 176.6 LBS | HEIGHT: 64 IN | DIASTOLIC BLOOD PRESSURE: 66 MMHG | SYSTOLIC BLOOD PRESSURE: 137 MMHG | BODY MASS INDEX: 30.15 KG/M2 | HEART RATE: 70 BPM

## 2018-11-12 DIAGNOSIS — I10 ESSENTIAL HYPERTENSION: ICD-10-CM

## 2018-11-12 DIAGNOSIS — R94.39 ABNORMAL STRESS TEST: Primary | ICD-10-CM

## 2018-11-12 DIAGNOSIS — E78.5 DYSLIPIDEMIA: ICD-10-CM

## 2018-11-12 PROCEDURE — 99213 OFFICE O/P EST LOW 20 MIN: CPT | Performed by: NURSE PRACTITIONER

## 2018-11-12 NOTE — PROGRESS NOTES
Subjective:     Encounter Date:2018      Patient ID: Mariaelena Easley is a 73 y.o. female.    Chief Complaint: Coronary Artery Disease    PROBLEM LIST:  1. Chest pain  a. Myocardial perfusion study 18 EF of 66% very small sized, mildly severe area of ischemia in the lateral wall.  Low risk study. Read by Dr. Buckley.  2. Hypertension  3. Dyslipidemia  4. Borderline diabetes  5. Eczema  6. Surgeries:  a. Bilateral breast augmentation  b. Right breast biopsy  c.  section    History of Present Illness  Patient is a 73-year-old  female who we are seeing today in 6 month follow-up for low risk abnormal stress test.  Since last being seen she notes that she is had one episode of chest pain.  She has otherwise felt well.  Denies any increasing shortness of breath.  No syncope, near-syncope, or edema.  She thinks her statin is increasing her glucose levels.  She overall notes that she does not do any intentional at exercise but is able to walk up her stairs at home without difficulty.  She has been compliant with her medications.  He should notes that since being seen she had some left lower extremity edema.  She had a duplex performed which was negative for DVT.  There is an incidental notation of a Baker's cyst.    Allergies   Allergen Reactions   • Codeine    • Sulfa Antibiotics        Current Outpatient Medications:   •  amLODIPine (NORVASC) 5 MG tablet, Take 1 tablet by mouth Daily., Disp: 90 tablet, Rfl: 1  •  aspirin (ASPIRIN LOW DOSE) 81 MG chewable tablet, Chew 81 mg Daily., Disp: , Rfl:   •  atorvastatin (LIPITOR) 80 MG tablet, Take 1 tablet by mouth Daily., Disp: 30 tablet, Rfl: 11  •  Biotin w/ Vitamins C & E (HAIR SKIN & NAILS GUMMIES PO), Take 2 tablets by mouth Daily., Disp: , Rfl:   •  Calcium 500-100 MG-UNIT chewable tablet, Chew 1 tablet Every 4 (Four) Hours As Needed., Disp: , Rfl:   •  Coenzyme Q10 30 MG/5ML liquid, Take  by mouth Daily., Disp: , Rfl:   •  glucose blood  "(ONETOUCH VERIO) test strip, Use as instructed, Disp: 100 each, Rfl: 5  •  hydrochlorothiazide (HYDRODIURIL) 25 MG tablet, Take 1 tablet by mouth Daily., Disp: 30 tablet, Rfl: 3  •  metFORMIN ER (GLUCOPHAGE-XR) 500 MG 24 hr tablet, Take 1 tablet by mouth Daily With Breakfast., Disp: 90 tablet, Rfl: 3  •  ONETOUCH DELICA LANCETS 33G misc, Use as directed once daily as needed., Disp: 100 each, Rfl: 3  •  promethazine (PHENERGAN) 25 MG tablet, Take 1 tablet by mouth Every 6 (Six) Hours As Needed for Nausea or Vomiting., Disp: 20 tablet, Rfl: 0  •  rizatriptan MLT (MAXALT-MLT) 10 MG disintegrating tablet, Take 1 tablet by mouth 1 (One) Time As Needed for Migraine for up to 1 dose. May repeat in 2 hours if needed, Disp: 9 tablet, Rfl: 5  •  Vitamins A & D (VITAMIN A & D PO), Take 2 tablets by mouth Daily., Disp: , Rfl:     The following portions of the patient's history were reviewed and updated as appropriate: allergies, current medications, past family history, past medical history, past social history, past surgical history and problem list.    Review of Systems   Constitution: Positive for night sweats and weight gain.   Cardiovascular: Positive for leg swelling.   Respiratory: Positive for cough and sputum production.    Endocrine: Positive for polydipsia.   Hematologic/Lymphatic: Negative for bleeding problem. Bruises/bleeds easily.   Skin: Positive for dry skin and itching. Negative for rash.   Musculoskeletal: Negative for muscle weakness and myalgias.   Gastrointestinal: Positive for constipation. Negative for heartburn, nausea and vomiting.   Genitourinary: Positive for bladder incontinence and frequency.   Neurological: Positive for difficulty with concentration.   Psychiatric/Behavioral: The patient is nervous/anxious.           Objective:     /66 (BP Location: Left arm, Patient Position: Sitting)   Pulse 70   Ht 162.6 cm (64\")   Wt 80.1 kg (176 lb 9.6 oz)   LMP  (LMP Unknown)   BMI 30.31 kg/m²  "       Physical Exam   Constitutional: She is oriented to person, place, and time. She appears well-developed and well-nourished.   Neck: No JVD present. No tracheal deviation present.   No bruit auscultated bilaterally   Cardiovascular: Normal rate, regular rhythm and normal heart sounds. Exam reveals no friction rub.   No murmur heard.  Pulmonary/Chest: Effort normal and breath sounds normal. No respiratory distress.   Abdominal: Soft. Bowel sounds are normal. There is no tenderness.   Musculoskeletal: She exhibits no edema or deformity.   Neurological: She is alert and oriented to person, place, and time.   Skin: Skin is warm and dry.     Lab Review:    Procedures        Assessment:   Mariaelena was seen today for coronary artery disease.    Diagnoses and all orders for this visit:    Abnormal stress test, low risk performed in January of this year.  No current anginal symptoms.  Medical therapy.    Essential hypertension, controlled.    Dyslipidemia, on high intensity statin and stable.      Plan:  1. Continue statin for dyslipidemia and mildly abnormal stress test.  2. Continue current medical therapy for abnormal stress test.  3. Educated patient regarding symptoms to return for including exertional chest pain, exertional dyspnea.  She verbalized understanding.  4. Revisit in 12 MO, or sooner as needed.    ANGELA Alarcon     Dictated with Walter.

## 2018-12-10 DIAGNOSIS — I10 ESSENTIAL HYPERTENSION: ICD-10-CM

## 2018-12-11 RX ORDER — HYDROCHLOROTHIAZIDE 25 MG/1
25 TABLET ORAL DAILY
Qty: 30 TABLET | Refills: 3 | Status: SHIPPED | OUTPATIENT
Start: 2018-12-11 | End: 2019-05-29 | Stop reason: SDUPTHER

## 2018-12-11 RX ORDER — AMLODIPINE BESYLATE 5 MG/1
5 TABLET ORAL DAILY
Qty: 90 TABLET | Refills: 1 | Status: SHIPPED | OUTPATIENT
Start: 2018-12-11 | End: 2019-05-29 | Stop reason: SDUPTHER

## 2019-01-03 ENCOUNTER — OFFICE VISIT (OUTPATIENT)
Dept: INTERNAL MEDICINE | Facility: CLINIC | Age: 74
End: 2019-01-03

## 2019-01-03 VITALS
HEART RATE: 77 BPM | OXYGEN SATURATION: 98 % | WEIGHT: 176.6 LBS | BODY MASS INDEX: 30.31 KG/M2 | DIASTOLIC BLOOD PRESSURE: 72 MMHG | SYSTOLIC BLOOD PRESSURE: 130 MMHG

## 2019-01-03 DIAGNOSIS — J20.9 ACUTE BRONCHITIS, UNSPECIFIED ORGANISM: ICD-10-CM

## 2019-01-03 DIAGNOSIS — E08.00 DIABETES MELLITUS DUE TO UNDERLYING CONDITION WITH HYPEROSMOLARITY WITHOUT COMA, WITHOUT LONG-TERM CURRENT USE OF INSULIN (HCC): Primary | ICD-10-CM

## 2019-01-03 DIAGNOSIS — I10 ESSENTIAL HYPERTENSION: ICD-10-CM

## 2019-01-03 LAB
EXPIRATION DATE: NORMAL
FLUAV AG NPH QL: NORMAL
FLUBV AG NPH QL: NORMAL
GLUCOSE BLDC GLUCOMTR-MCNC: 151 MG/DL (ref 70–130)
HBA1C MFR BLD: 6.6 %
INTERNAL CONTROL: NORMAL
Lab: NORMAL

## 2019-01-03 PROCEDURE — 87804 INFLUENZA ASSAY W/OPTIC: CPT | Performed by: PHYSICIAN ASSISTANT

## 2019-01-03 PROCEDURE — 82947 ASSAY GLUCOSE BLOOD QUANT: CPT | Performed by: PHYSICIAN ASSISTANT

## 2019-01-03 PROCEDURE — 99213 OFFICE O/P EST LOW 20 MIN: CPT | Performed by: PHYSICIAN ASSISTANT

## 2019-01-03 PROCEDURE — 83036 HEMOGLOBIN GLYCOSYLATED A1C: CPT | Performed by: PHYSICIAN ASSISTANT

## 2019-01-03 NOTE — ASSESSMENT & PLAN NOTE
Diabetes is improving with treatment.   Continue current treatment regimen.  Reminded to bring in blood sugar diary at next visit.  Dietary recommendations for ADA diet.  Regular aerobic exercise.  Diabetes will be reassessed in 3 months.

## 2019-01-03 NOTE — ASSESSMENT & PLAN NOTE
Hypertension is improving with treatment.  Continue current treatment regimen.  Dietary sodium restriction.  Weight loss.  Regular aerobic exercise.  Continue current medications.  Blood pressure will be reassessed at the next regular appointment.

## 2019-01-03 NOTE — PROGRESS NOTES
Chief Complaint   Patient presents with   • Diabetes     3 month follow up    • Cough     patient states when she coughs her throat is raw and coughing up green drainage        Subjective   Mariaelena Easley is a 73 y.o. female.       History of Present Illness     Pt started feeling bad about 4 days ago with achiness, decreased appetite, chest congestion, cough with raw throat. Had some sore throat initially, not as much now. Has some head congestion. Taking dayquil and nyquil, drinking water and hot tea.  had been sick the week before, he is now recovered.    Tried to get hepatitis A shot done at [facility] and it was going to be $80 so she did not get it.    She is taking her metformin in the morning with a protein drink. She is taking all her other medications regularly. She has been drinking more water.    She has not been checking her blood pressure recently.        Current Outpatient Medications:   •  amLODIPine (NORVASC) 5 MG tablet, Take 1 tablet by mouth Daily., Disp: 90 tablet, Rfl: 1  •  aspirin (ASPIRIN LOW DOSE) 81 MG chewable tablet, Chew 81 mg Daily., Disp: , Rfl:   •  atorvastatin (LIPITOR) 80 MG tablet, Take 1 tablet by mouth Daily., Disp: 30 tablet, Rfl: 11  •  Biotin w/ Vitamins C & E (HAIR SKIN & NAILS GUMMIES PO), Take 2 tablets by mouth Daily., Disp: , Rfl:   •  Calcium 500-100 MG-UNIT chewable tablet, Chew 1 tablet Every 4 (Four) Hours As Needed., Disp: , Rfl:   •  Coenzyme Q10 30 MG/5ML liquid, Take  by mouth Daily., Disp: , Rfl:   •  glucose blood (ONETOUCH VERIO) test strip, Use as instructed, Disp: 100 each, Rfl: 5  •  hydrochlorothiazide (HYDRODIURIL) 25 MG tablet, Take 1 tablet by mouth Daily., Disp: 30 tablet, Rfl: 3  •  metFORMIN ER (GLUCOPHAGE-XR) 500 MG 24 hr tablet, Take 1 tablet by mouth Daily With Breakfast., Disp: 90 tablet, Rfl: 3  •  ONETOUCH DELICA LANCETS 33G misc, Use as directed once daily as needed., Disp: 100 each, Rfl: 3  •  promethazine (PHENERGAN) 25 MG tablet,  Take 1 tablet by mouth Every 6 (Six) Hours As Needed for Nausea or Vomiting., Disp: 20 tablet, Rfl: 0  •  rizatriptan MLT (MAXALT-MLT) 10 MG disintegrating tablet, Take 1 tablet by mouth 1 (One) Time As Needed for Migraine for up to 1 dose. May repeat in 2 hours if needed, Disp: 9 tablet, Rfl: 5  •  Vitamins A & D (VITAMIN A & D PO), Take 2 tablets by mouth Daily., Disp: , Rfl:      PMFSH  The following portions of the patient's history were reviewed and updated as appropriate: allergies, current medications, past family history, past medical history, past social history, past surgical history and problem list.    Review of Systems   Constitutional: Positive for fatigue. Negative for activity change and unexpected weight change.   HENT: Positive for congestion, postnasal drip and sore throat. Negative for ear pain.    Eyes: Negative for pain and discharge.   Respiratory: Positive for cough. Negative for chest tightness, shortness of breath and wheezing.    Cardiovascular: Negative for chest pain and palpitations.   Gastrointestinal: Negative for abdominal pain, diarrhea and vomiting.   Endocrine: Negative.    Genitourinary: Negative.    Musculoskeletal: Negative for joint swelling.   Skin: Negative for color change, rash and wound.   Allergic/Immunologic: Negative.    Neurological: Negative for dizziness, seizures, syncope, light-headedness and headaches.   Psychiatric/Behavioral: Negative.        Objective   /72   Pulse 77   Wt 80.1 kg (176 lb 9.6 oz)   LMP  (LMP Unknown)   SpO2 98%   BMI 30.31 kg/m²     Physical Exam   Constitutional: She is oriented to person, place, and time. She appears well-developed and well-nourished.  Non-toxic appearance. No distress.   HENT:   Head: Normocephalic and atraumatic. Hair is normal.   Right Ear: External ear normal. No drainage, swelling or tenderness. Tympanic membrane is retracted.   Left Ear: External ear normal. No drainage, swelling or tenderness. Tympanic  membrane is retracted.   Nose: Mucosal edema present. No epistaxis.   Mouth/Throat: Uvula is midline and mucous membranes are normal. No oral lesions. No uvula swelling. Posterior oropharyngeal erythema present. No oropharyngeal exudate.   Eyes: Conjunctivae and EOM are normal. Pupils are equal, round, and reactive to light. Right eye exhibits no discharge. Left eye exhibits no discharge. No scleral icterus.   Neck: Normal range of motion. Neck supple.   Cardiovascular: Normal rate, regular rhythm and normal heart sounds. Exam reveals no gallop.   No murmur heard.  Pulmonary/Chest: Breath sounds normal. No stridor. No respiratory distress. She has no wheezes. She has no rales. She exhibits no tenderness.   Abdominal: Soft. There is no tenderness.   Lymphadenopathy:     She has cervical adenopathy.   Neurological: She is alert and oriented to person, place, and time. She exhibits normal muscle tone.   Skin: Skin is warm and dry. No rash noted. She is not diaphoretic.   Psychiatric: She has a normal mood and affect. Her behavior is normal. Judgment and thought content normal.   Nursing note and vitals reviewed.      Results for orders placed or performed in visit on 01/03/19   POCT Glucose   Result Value Ref Range    Glucose 151 (A) 70 - 130 mg/dL   POC Glycosylated Hemoglobin (Hb A1C)   Result Value Ref Range    Hemoglobin A1C 6.6 %   POCT Influenza A/B   Result Value Ref Range    Rapid Influenza A Ag neg Negative    Rapid Influenza B Ag neg Negative    Internal Control Passed Passed    Lot Number [id number]     Expiration Date 11/08/2020         ASSESSMENT/PLAN    Problem List Items Addressed This Visit        Cardiovascular and Mediastinum    Essential hypertension     Hypertension is improving with treatment.  Continue current treatment regimen.  Dietary sodium restriction.  Weight loss.  Regular aerobic exercise.  Continue current medications.  Blood pressure will be reassessed at the next regular appointment.             Endocrine    Diabetes mellitus (CMS/[facility]) - Primary     Diabetes is improving with treatment.   Continue current treatment regimen.  Reminded to bring in blood sugar diary at next visit.  Dietary recommendations for ADA diet.  Regular aerobic exercise.  Diabetes will be reassessed in 3 months.         Relevant Orders    POCT Glucose (Completed)    POC Glycosylated Hemoglobin (Hb A1C) (Completed)      Other Visit Diagnoses     Acute bronchitis, unspecified organism        Use otc symptomatic care. Increase rest and fluids. Call if worsening or no improvement.    Relevant Orders    POCT Influenza A/B (Completed)               Return in about 3 months (around 4/3/2019) for Recheck.

## 2019-01-16 ENCOUNTER — TRANSCRIBE ORDERS (OUTPATIENT)
Dept: ADMINISTRATIVE | Facility: HOSPITAL | Age: 74
End: 2019-01-16

## 2019-01-16 DIAGNOSIS — Z12.31 VISIT FOR SCREENING MAMMOGRAM: Primary | ICD-10-CM

## 2019-03-13 RX ORDER — ATORVASTATIN CALCIUM 80 MG/1
80 TABLET, FILM COATED ORAL DAILY
Qty: 30 TABLET | Refills: 11 | Status: SHIPPED | OUTPATIENT
Start: 2019-03-13 | End: 2019-11-18 | Stop reason: SDUPTHER

## 2019-03-19 ENCOUNTER — APPOINTMENT (OUTPATIENT)
Dept: MAMMOGRAPHY | Facility: HOSPITAL | Age: 74
End: 2019-03-19

## 2019-03-25 ENCOUNTER — HOSPITAL ENCOUNTER (OUTPATIENT)
Dept: MAMMOGRAPHY | Facility: HOSPITAL | Age: 74
Discharge: HOME | End: 2019-03-25
Admitting: PHYSICIAN ASSISTANT

## 2019-03-25 ENCOUNTER — OFFICE VISIT (OUTPATIENT)
Dept: INTERNAL MEDICINE | Facility: CLINIC | Age: 74
End: 2019-03-25

## 2019-03-25 VITALS
WEIGHT: 175 LBS | OXYGEN SATURATION: 97 % | HEIGHT: 64 IN | TEMPERATURE: 98 F | DIASTOLIC BLOOD PRESSURE: 78 MMHG | BODY MASS INDEX: 29.88 KG/M2 | HEART RATE: 71 BPM | SYSTOLIC BLOOD PRESSURE: 156 MMHG

## 2019-03-25 DIAGNOSIS — Z00.00 ENCOUNTER FOR MEDICARE ANNUAL WELLNESS EXAM: Primary | ICD-10-CM

## 2019-03-25 DIAGNOSIS — I10 ESSENTIAL HYPERTENSION: ICD-10-CM

## 2019-03-25 DIAGNOSIS — E78.5 HYPERLIPIDEMIA, UNSPECIFIED HYPERLIPIDEMIA TYPE: ICD-10-CM

## 2019-03-25 DIAGNOSIS — E08.00 DIABETES MELLITUS DUE TO UNDERLYING CONDITION WITH HYPEROSMOLARITY WITHOUT COMA, WITHOUT LONG-TERM CURRENT USE OF INSULIN (HCC): ICD-10-CM

## 2019-03-25 DIAGNOSIS — Z12.31 VISIT FOR SCREENING MAMMOGRAM: ICD-10-CM

## 2019-03-25 PROBLEM — C44.320 SQUAMOUS CELL SKIN CANCER, FACE: Status: ACTIVE | Noted: 2019-03-25

## 2019-03-25 LAB
ALBUMIN SERPL-MCNC: 4.71 G/DL (ref 3.2–4.8)
ALBUMIN/GLOB SERPL: 1.8 G/DL (ref 1.5–2.5)
ALP SERPL-CCNC: 114 U/L (ref 25–100)
ALT SERPL W P-5'-P-CCNC: 24 U/L (ref 7–40)
ANION GAP SERPL CALCULATED.3IONS-SCNC: 11 MMOL/L (ref 3–11)
ARTICHOKE IGE QN: 104 MG/DL (ref 0–130)
AST SERPL-CCNC: 19 U/L (ref 0–33)
BILIRUB SERPL-MCNC: 0.6 MG/DL (ref 0.3–1.2)
BUN BLD-MCNC: 22 MG/DL (ref 9–23)
BUN/CREAT SERPL: 32.4 (ref 7–25)
CALCIUM SPEC-SCNC: 9.8 MG/DL (ref 8.7–10.4)
CHLORIDE SERPL-SCNC: 96 MMOL/L (ref 99–109)
CHOLEST SERPL-MCNC: 172 MG/DL (ref 0–200)
CO2 SERPL-SCNC: 30 MMOL/L (ref 20–31)
CREAT BLD-MCNC: 0.68 MG/DL (ref 0.6–1.3)
GFR SERPL CREATININE-BSD FRML MDRD: 85 ML/MIN/1.73
GLOBULIN UR ELPH-MCNC: 2.7 GM/DL
GLUCOSE BLD-MCNC: 133 MG/DL (ref 70–100)
HBA1C MFR BLD: 7.4 % (ref 4.8–5.6)
HDLC SERPL-MCNC: 57 MG/DL (ref 40–60)
POC CREATININE URINE: 50
POC MICROALBUMIN URINE: 10
POTASSIUM BLD-SCNC: 4 MMOL/L (ref 3.5–5.5)
PROT SERPL-MCNC: 7.4 G/DL (ref 5.7–8.2)
SODIUM BLD-SCNC: 137 MMOL/L (ref 132–146)
TRIGL SERPL-MCNC: 103 MG/DL (ref 0–150)

## 2019-03-25 PROCEDURE — 80061 LIPID PANEL: CPT | Performed by: PHYSICIAN ASSISTANT

## 2019-03-25 PROCEDURE — 77067 SCR MAMMO BI INCL CAD: CPT

## 2019-03-25 PROCEDURE — 82570 ASSAY OF URINE CREATININE: CPT | Performed by: PHYSICIAN ASSISTANT

## 2019-03-25 PROCEDURE — 82044 UR ALBUMIN SEMIQUANTITATIVE: CPT | Performed by: PHYSICIAN ASSISTANT

## 2019-03-25 PROCEDURE — 83036 HEMOGLOBIN GLYCOSYLATED A1C: CPT | Performed by: PHYSICIAN ASSISTANT

## 2019-03-25 PROCEDURE — 80053 COMPREHEN METABOLIC PANEL: CPT | Performed by: PHYSICIAN ASSISTANT

## 2019-03-25 PROCEDURE — G0439 PPPS, SUBSEQ VISIT: HCPCS | Performed by: PHYSICIAN ASSISTANT

## 2019-03-25 PROCEDURE — 77063 BREAST TOMOSYNTHESIS BI: CPT | Performed by: RADIOLOGY

## 2019-03-25 PROCEDURE — 77063 BREAST TOMOSYNTHESIS BI: CPT

## 2019-03-25 PROCEDURE — 77067 SCR MAMMO BI INCL CAD: CPT | Performed by: RADIOLOGY

## 2019-03-25 NOTE — PROGRESS NOTES
QUICK REFERENCE INFORMATION:  The ABCs of the Annual Wellness Visit    Subsequent Medicare Wellness Visit    HEALTH RISK ASSESSMENT    1945    Recent Hospitalizations:  No hospitalization(s) within the last year..        Current Medical Providers:  Patient Care Team:  Nuris Cristobal PA as PCP - General (Internal Medicine)        Smoking Status:  Social History     Tobacco Use   Smoking Status Former Smoker   Smokeless Tobacco Never Used       Alcohol Consumption:  Social History     Substance and Sexual Activity   Alcohol Use Yes   • Frequency: Monthly or less       Depression Screen:   PHQ-2/PHQ-9 Depression Screening 3/25/2019   Little interest or pleasure in doing things 0   Feeling down, depressed, or hopeless 0   Trouble falling or staying asleep, or sleeping too much -   Feeling tired or having little energy -   Poor appetite or overeating -   Feeling bad about yourself - or that you are a failure or have let yourself or your family down -   Trouble concentrating on things, such as reading the newspaper or watching television -   Moving or speaking so slowly that other people could have noticed. Or the opposite - being so fidgety or restless that you have been moving around a lot more than usual -   Total Score 0   If you checked off any problems, how difficult have these problems made it for you to do your work, take care of things at home, or get along with other people? -       Health Habits and Functional and Cognitive Screening:  Functional & Cognitive Status 3/25/2019   Do you have difficulty preparing food and eating? No   Do you have difficulty bathing yourself, getting dressed or grooming yourself? No   Do you have difficulty using the toilet? No   Do you have difficulty moving around from place to place? No   Do you have trouble with steps or getting out of a bed or a chair? No   In the past year have you fallen or experienced a near fall? No   Current Diet Limited Junk Food   Dental Exam Not  up to date   Eye Exam Up to date   Exercise (times per week) 3 times per week   Current Exercise Activities Include Walking   Do you need help using the phone?  No   Are you deaf or do you have serious difficulty hearing?  No   Do you need help with transportation? No   Do you need help shopping? No   Do you need help preparing meals?  No   Do you need help with housework?  No   Do you need help with laundry? No   Do you need help taking your medications? No   Do you need help managing money? No   Do you ever drive or ride in a car without wearing a seat belt? No   Have you felt unusual stress, anger or loneliness in the last month? No   Who do you live with? Spouse   If you need help, do you have trouble finding someone available to you? No   Have you been bothered in the last four weeks by sexual problems? No   Do you have difficulty concentrating, remembering or making decisions? No           Does the patient have evidence of cognitive impairment? No    Aspirin use counseling: Does not need ASA but is currently taking (advised patient that ASA is not indicated and patient chooses to stop it)      Recent Lab Results:  CMP:  Lab Results   Component Value Date    BUN 22 03/25/2019    CREATININE 0.68 03/25/2019    EGFRIFNONA 85 03/25/2019    BCR 32.4 (H) 03/25/2019     03/25/2019    K 4.0 03/25/2019    CO2 30.0 03/25/2019    CALCIUM 9.8 03/25/2019    ALBUMIN 4.71 03/25/2019    BILITOT 0.6 03/25/2019    ALKPHOS 114 (H) 03/25/2019    AST 19 03/25/2019    ALT 24 03/25/2019     Lipid Panel:  Lab Results   Component Value Date    CHOL 172 03/25/2019    TRIG 103 03/25/2019    HDL 57 03/25/2019     HbA1c:  Lab Results   Component Value Date    HGBA1C 7.40 (H) 03/25/2019       Visual Acuity:  No exam data present    Age-appropriate Screening Schedule:  Refer to the list below for future screening recommendations based on patient's age, sex and/or medical conditions. Orders for these recommended tests are listed in the  plan section. The patient has been provided with a written plan.    Health Maintenance   Topic Date Due   • DIABETIC EYE EXAM  08/16/2017   • ZOSTER VACCINE (2 of 2) 10/02/2019 (Originally 2/26/2012)   • HEMOGLOBIN A1C  09/25/2019   • MAMMOGRAM  01/10/2020   • DIABETIC FOOT EXAM  03/25/2020   • LIPID PANEL  03/25/2020   • URINE MICROALBUMIN  03/25/2020   • DXA SCAN  03/26/2020   • TDAP/TD VACCINES (3 - Td) 06/01/2024   • COLONOSCOPY  11/02/2025   • INFLUENZA VACCINE  Completed   • PNEUMOCOCCAL VACCINES (65+ LOW/MEDIUM RISK)  Completed        Subjective   History of Present Illness    Mariaelena Easley is a 73 y.o. female who presents for an Subsequent Wellness Visit.    The following portions of the patient's history were reviewed and updated as appropriate: allergies, current medications, past family history, past medical history, past social history, past surgical history and problem list.    Outpatient Medications Prior to Visit   Medication Sig Dispense Refill   • amLODIPine (NORVASC) 5 MG tablet Take 1 tablet by mouth Daily. 90 tablet 1   • aspirin (ASPIRIN LOW DOSE) 81 MG chewable tablet Chew 81 mg Daily.     • atorvastatin (LIPITOR) 80 MG tablet Take 1 tablet by mouth Daily. 30 tablet 11   • Biotin w/ Vitamins C & E (HAIR SKIN & NAILS GUMMIES PO) Take 2 tablets by mouth Daily.     • Calcium 500-100 MG-UNIT chewable tablet Chew 1 tablet Every 4 (Four) Hours As Needed.     • Coenzyme Q10 30 MG/5ML liquid Take  by mouth Daily.     • glucose blood (ONETOUCH VERIO) test strip Use as instructed 100 each 5   • hydrochlorothiazide (HYDRODIURIL) 25 MG tablet Take 1 tablet by mouth Daily. 30 tablet 3   • metFORMIN ER (GLUCOPHAGE-XR) 500 MG 24 hr tablet Take 1 tablet by mouth Daily With Breakfast. 90 tablet 3   • ONETOUCH DELICA LANCETS 33G misc Use as directed once daily as needed. 100 each 3   • promethazine (PHENERGAN) 25 MG tablet Take 1 tablet by mouth Every 6 (Six) Hours As Needed for Nausea or Vomiting. 20 tablet 0    • rizatriptan MLT (MAXALT-MLT) 10 MG disintegrating tablet Take 1 tablet by mouth 1 (One) Time As Needed for Migraine for up to 1 dose. May repeat in 2 hours if needed 9 tablet 5   • Vitamins A & D (VITAMIN A & D PO) Take 2 tablets by mouth Daily.       No facility-administered medications prior to visit.        Patient Active Problem List   Diagnosis   • Anemia   • Hyperlipidemia   • Migraine headache   • Thyroid disease   • Episodic recurrent vertigo   • Osteopenia   • Seborrheic eczema   • Cyst of uterus   • Cyst of breast   • Essential hypertension   • Diabetes mellitus (CMS/HCC)   • Squamous cell skin cancer, face       Advance Care Planning:  has an advance directive - a copy HAS NOT been provided. Have asked the patient to send this to us to add to record.    Identification of Risk Factors:  Risk factors include: weight , cardiovascular risk and inactivity.    Review of Systems   Constitutional: Negative for appetite change, fever and unexpected weight change.   HENT: Negative for ear pain, facial swelling and sore throat.    Eyes: Negative for pain and visual disturbance.   Respiratory: Negative for chest tightness, shortness of breath and wheezing.    Cardiovascular: Negative for chest pain and palpitations.   Gastrointestinal: Negative for abdominal pain and blood in stool.   Endocrine: Negative.    Genitourinary: Negative for difficulty urinating and hematuria.   Musculoskeletal: Negative for joint swelling.   Neurological: Negative for tremors, seizures and syncope.   Hematological: Negative for adenopathy.   Psychiatric/Behavioral: Negative.        Compared to one year ago, the patient feels her physical health is the same.  Compared to one year ago, the patient feels her mental health is the same.    Objective     Physical Exam   Constitutional: She appears well-developed and well-nourished.   HENT:   Head: Normocephalic.   Right Ear: Hearing, tympanic membrane, external ear and ear canal normal.  "  Left Ear: Hearing, tympanic membrane, external ear and ear canal normal.   Nose: Nose normal.   Mouth/Throat: Oropharynx is clear and moist.   Eyes: Conjunctivae are normal. Pupils are equal, round, and reactive to light.   Neck: Normal range of motion.   Cardiovascular: Normal rate, regular rhythm and normal heart sounds.   Pulmonary/Chest: Effort normal and breath sounds normal. She has no decreased breath sounds. She has no wheezes. She has no rhonchi. She has no rales.   Musculoskeletal: Normal range of motion.    Mariaelena had a diabetic foot exam performed today.  Neurological: She is alert.   Skin: Skin is warm and dry.   Psychiatric: She has a normal mood and affect. Her behavior is normal.   Nursing note and vitals reviewed.      Vitals:    03/25/19 1420   BP: 156/78   Pulse: 71   Temp: 98 °F (36.7 °C)   SpO2: 97%   Weight: 79.4 kg (175 lb)   Height: 162.6 cm (64\")   PainSc: 0-No pain       Patient's Body mass index is 30.04 kg/m². BMI is above normal parameters. Recommendations include: exercise counseling and nutrition counseling.      Assessment/Plan   Patient Self-Management and Personalized Health Advice  The patient has been provided with information about: diet, exercise and weight management and preventive services including:   · Exercise counseling provided, Nutrition counseling provided, Shingrix vaccine.    Visit Diagnoses:    ICD-10-CM ICD-9-CM   1. Encounter for Medicare annual wellness exam Z00.00 V70.0   2. Hyperlipidemia, unspecified hyperlipidemia type E78.5 272.4   3. Essential hypertension I10 401.9   4. Diabetes mellitus due to underlying condition with hyperosmolarity without coma, without long-term current use of insulin (CMS/[facility]) E08.00 249.20       Orders Placed This Encounter   Procedures   • Lipid Panel   • Comprehensive Metabolic Panel   • Hemoglobin A1c   • POCT microalbumin       Outpatient Encounter Medications as of 3/25/2019   Medication Sig Dispense Refill   • amLODIPine " (NORVASC) 5 MG tablet Take 1 tablet by mouth Daily. 90 tablet 1   • aspirin (ASPIRIN LOW DOSE) 81 MG chewable tablet Chew 81 mg Daily.     • atorvastatin (LIPITOR) 80 MG tablet Take 1 tablet by mouth Daily. 30 tablet 11   • Biotin w/ Vitamins C & E (HAIR SKIN & NAILS GUMMIES PO) Take 2 tablets by mouth Daily.     • Calcium 500-100 MG-UNIT chewable tablet Chew 1 tablet Every 4 (Four) Hours As Needed.     • Coenzyme Q10 30 MG/5ML liquid Take  by mouth Daily.     • glucose blood (ONETOUCH VERIO) test strip Use as instructed 100 each 5   • hydrochlorothiazide (HYDRODIURIL) 25 MG tablet Take 1 tablet by mouth Daily. 30 tablet 3   • metFORMIN ER (GLUCOPHAGE-XR) 500 MG 24 hr tablet Take 1 tablet by mouth Daily With Breakfast. 90 tablet 3   • ONETOUCH DELICA LANCETS 33G misc Use as directed once daily as needed. 100 each 3   • promethazine (PHENERGAN) 25 MG tablet Take 1 tablet by mouth Every 6 (Six) Hours As Needed for Nausea or Vomiting. 20 tablet 0   • rizatriptan MLT (MAXALT-MLT) 10 MG disintegrating tablet Take 1 tablet by mouth 1 (One) Time As Needed for Migraine for up to 1 dose. May repeat in 2 hours if needed 9 tablet 5   • Vitamins A & D (VITAMIN A & D PO) Take 2 tablets by mouth Daily.       No facility-administered encounter medications on file as of 3/25/2019.        Reviewed use of high risk medication in the elderly: yes  Reviewed for potential of harmful drug interactions in the elderly: yes    Follow Up:  Return in about 3 months (around 6/25/2019) for Medicare Wellness in 1 year.     An After Visit Summary and PPPS with all of these plans were given to the patient.

## 2019-03-27 NOTE — ASSESSMENT & PLAN NOTE
Hypertension is worsening.  Continue current treatment regimen.  Dietary sodium restriction.  Weight loss.  Regular aerobic exercise.  Continue current medications.  Blood pressure will be reassessed at the next regular appointment.

## 2019-03-28 DIAGNOSIS — E11.65 TYPE 2 DIABETES MELLITUS WITH HYPERGLYCEMIA, WITHOUT LONG-TERM CURRENT USE OF INSULIN (HCC): Primary | ICD-10-CM

## 2019-03-28 NOTE — PROGRESS NOTES
Please let her know that her a1c has increased to 7.4 (from 6.6). I would like to increase her metformin to 2 tablets daily. I am also putting in a referral to diabetes education for some help with diet changes. Let me know if she has already done this.    The rest of her labs are stable.

## 2019-04-10 ENCOUNTER — HOSPITAL ENCOUNTER (OUTPATIENT)
Dept: MAMMOGRAPHY | Facility: HOSPITAL | Age: 74
Discharge: HOME | End: 2019-04-10
Admitting: PHYSICIAN ASSISTANT

## 2019-04-10 DIAGNOSIS — Z12.31 VISIT FOR SCREENING MAMMOGRAM: ICD-10-CM

## 2019-04-10 PROCEDURE — 77067 SCR MAMMO BI INCL CAD: CPT

## 2019-04-10 PROCEDURE — 77063 BREAST TOMOSYNTHESIS BI: CPT

## 2019-04-17 DIAGNOSIS — G43.909 MIGRAINE WITHOUT STATUS MIGRAINOSUS, NOT INTRACTABLE, UNSPECIFIED MIGRAINE TYPE: ICD-10-CM

## 2019-04-17 RX ORDER — RIZATRIPTAN BENZOATE 10 MG/1
10 TABLET, ORALLY DISINTEGRATING ORAL ONCE AS NEEDED
Qty: 9 TABLET | Refills: 5 | Status: SHIPPED | OUTPATIENT
Start: 2019-04-17 | End: 2022-10-27 | Stop reason: SDUPTHER

## 2019-04-25 DIAGNOSIS — E11.9 TYPE 2 DIABETES MELLITUS WITHOUT COMPLICATION, WITHOUT LONG-TERM CURRENT USE OF INSULIN (HCC): ICD-10-CM

## 2019-04-25 RX ORDER — METFORMIN HYDROCHLORIDE 500 MG/1
500 TABLET, EXTENDED RELEASE ORAL
Qty: 90 TABLET | Refills: 3 | Status: SHIPPED | OUTPATIENT
Start: 2019-04-25 | End: 2019-06-17 | Stop reason: SDUPTHER

## 2019-05-29 DIAGNOSIS — I10 ESSENTIAL HYPERTENSION: ICD-10-CM

## 2019-05-29 RX ORDER — AMLODIPINE BESYLATE 5 MG/1
5 TABLET ORAL DAILY
Qty: 90 TABLET | Refills: 1 | Status: SHIPPED | OUTPATIENT
Start: 2019-05-29 | End: 2019-12-26 | Stop reason: SDUPTHER

## 2019-05-29 RX ORDER — HYDROCHLOROTHIAZIDE 25 MG/1
25 TABLET ORAL DAILY
Qty: 90 TABLET | Refills: 0 | Status: SHIPPED | OUTPATIENT
Start: 2019-05-29 | End: 2019-08-07 | Stop reason: SDUPTHER

## 2019-06-17 DIAGNOSIS — E11.9 TYPE 2 DIABETES MELLITUS WITHOUT COMPLICATION, WITHOUT LONG-TERM CURRENT USE OF INSULIN (HCC): ICD-10-CM

## 2019-06-17 NOTE — TELEPHONE ENCOUNTER
BOBBY INCREASED TO 2 PILLS A DAY   PLEASE CALL [facility]    PTS NUMBER IF YOU HAVE QUESTIONS  [phone]

## 2019-06-19 RX ORDER — METFORMIN HYDROCHLORIDE 500 MG/1
500 TABLET, EXTENDED RELEASE ORAL
Qty: 90 TABLET | Refills: 3 | Status: SHIPPED | OUTPATIENT
Start: 2019-06-19 | End: 2019-06-25 | Stop reason: SDUPTHER

## 2019-06-25 ENCOUNTER — OFFICE VISIT (OUTPATIENT)
Dept: INTERNAL MEDICINE | Facility: CLINIC | Age: 74
End: 2019-06-25

## 2019-06-25 VITALS
WEIGHT: 166.6 LBS | BODY MASS INDEX: 28.6 KG/M2 | HEART RATE: 78 BPM | SYSTOLIC BLOOD PRESSURE: 136 MMHG | DIASTOLIC BLOOD PRESSURE: 78 MMHG | OXYGEN SATURATION: 99 %

## 2019-06-25 DIAGNOSIS — E11.9 TYPE 2 DIABETES MELLITUS WITHOUT COMPLICATION, WITHOUT LONG-TERM CURRENT USE OF INSULIN (HCC): ICD-10-CM

## 2019-06-25 DIAGNOSIS — E11.65 TYPE 2 DIABETES MELLITUS WITH HYPERGLYCEMIA, WITHOUT LONG-TERM CURRENT USE OF INSULIN (HCC): Primary | ICD-10-CM

## 2019-06-25 LAB
GLUCOSE BLDC GLUCOMTR-MCNC: 135 MG/DL (ref 70–130)
HBA1C MFR BLD: 7.3 %

## 2019-06-25 PROCEDURE — 83036 HEMOGLOBIN GLYCOSYLATED A1C: CPT | Performed by: INTERNAL MEDICINE

## 2019-06-25 PROCEDURE — 82962 GLUCOSE BLOOD TEST: CPT | Performed by: INTERNAL MEDICINE

## 2019-06-25 PROCEDURE — 99213 OFFICE O/P EST LOW 20 MIN: CPT | Performed by: INTERNAL MEDICINE

## 2019-06-25 RX ORDER — METFORMIN HYDROCHLORIDE 1000 MG/1
1000 TABLET, FILM COATED, EXTENDED RELEASE ORAL 2 TIMES DAILY
Qty: 180 TABLET | Refills: 1 | Status: SHIPPED | OUTPATIENT
Start: 2019-06-25 | End: 2019-06-28 | Stop reason: SDUPTHER

## 2019-06-25 NOTE — PROGRESS NOTES
Chief Complaint   Patient presents with   • Follow-up     Medications       Subjective   Mariaelena Easley is a 74 y.o. female.       History of Present Illness     Pt has been taking metformin twice daily, although last night she took both doses together last night. Diarrhea this morning,  has also had some loose stools- may have been from something they ate.    She has been eating a lot fresh vegetables and fruit. She has been checking her blood sugar in the mornings and her numbers have not been well-controlled. Not getting as much exercise because she is not taking care of her son's condo anymore.         Current Outpatient Medications:   •  amLODIPine (NORVASC) 5 MG tablet, Take 1 tablet by mouth Daily., Disp: 90 tablet, Rfl: 1  •  atorvastatin (LIPITOR) 80 MG tablet, Take 1 tablet by mouth Daily., Disp: 30 tablet, Rfl: 11  •  Biotin w/ Vitamins C & E (HAIR SKIN & NAILS GUMMIES PO), Take 2 tablets by mouth Daily., Disp: , Rfl:   •  Calcium 500-100 MG-UNIT chewable tablet, Chew 1 tablet Every 4 (Four) Hours As Needed., Disp: , Rfl:   •  Coenzyme Q10 30 MG/5ML liquid, Take  by mouth Daily., Disp: , Rfl:   •  glucose blood (ONETOUCH VERIO) test strip, Use as instructed, Disp: 100 each, Rfl: 5  •  hydrochlorothiazide (HYDRODIURIL) 25 MG tablet, Take 1 tablet by mouth Daily for 90 days., Disp: 90 tablet, Rfl: 0  •  metFORMIN ER (GLUMETZA) 1000 MG (MOD) 24 hr tablet, Take 1 tablet by mouth 2 (Two) Times a Day., Disp: 180 tablet, Rfl: 1  •  ONETOUCH DELICA LANCETS 33G misc, Use as directed once daily as needed., Disp: 100 each, Rfl: 3  •  promethazine (PHENERGAN) 25 MG tablet, Take 1 tablet by mouth Every 6 (Six) Hours As Needed for Nausea or Vomiting., Disp: 20 tablet, Rfl: 0  •  rizatriptan MLT (MAXALT-MLT) 10 MG disintegrating tablet, Take 1 tablet by mouth 1 (One) Time As Needed for Migraine for up to 1 dose. May repeat in 2 hours if needed, Disp: 9 tablet, Rfl: 5  •  Vitamins A & D (VITAMIN A & D PO), Take 2  tablets by mouth Daily., Disp: , Rfl:      PMFSH  The following portions of the patient's history were reviewed and updated as appropriate: allergies, current medications, past family history, past medical history, past social history, past surgical history and problem list.    Review of Systems   Constitutional: Negative for activity change, appetite change and fatigue.   HENT: Negative for congestion and rhinorrhea.    Respiratory: Negative for chest tightness and shortness of breath.    Cardiovascular: Negative for chest pain and palpitations.   Gastrointestinal: Positive for diarrhea. Negative for abdominal pain.   Genitourinary: Negative for dysuria.   Musculoskeletal: Negative for arthralgias and myalgias.   Neurological: Negative for dizziness, weakness, light-headedness and headaches.   Psychiatric/Behavioral: Negative for dysphoric mood. The patient is not nervous/anxious.        Objective   /78   Pulse 78   Wt 75.6 kg (166 lb 9.6 oz)   LMP  (LMP Unknown)   SpO2 99%   Breastfeeding? No   BMI 28.60 kg/m²     Physical Exam   Constitutional: She appears well-developed and well-nourished.   HENT:   Head: Normocephalic.   Right Ear: Hearing, tympanic membrane, external ear and ear canal normal.   Left Ear: Hearing, tympanic membrane, external ear and ear canal normal.   Nose: Nose normal.   Mouth/Throat: Oropharynx is clear and moist.   Eyes: Conjunctivae are normal. Pupils are equal, round, and reactive to light.   Neck: Normal range of motion.   Cardiovascular: Normal rate, regular rhythm and normal heart sounds.   Pulmonary/Chest: Effort normal and breath sounds normal. She has no decreased breath sounds. She has no wheezes. She has no rhonchi. She has no rales.   Musculoskeletal: Normal range of motion.   Neurological: She is alert.   Skin: Skin is warm and dry.   Psychiatric: She has a normal mood and affect. Her behavior is normal.   Nursing note and vitals reviewed.      Results for orders  placed or performed in visit on 06/25/19   POC Glycosylated Hemoglobin (Hb A1C)   Result Value Ref Range    Hemoglobin A1C 7.3 %   POCT Glucose   Result Value Ref Range    Glucose 135 (A) 70 - 130 mg/dL        ASSESSMENT/PLAN    Problem List Items Addressed This Visit        Endocrine    Diabetes mellitus (CMS/[facility]) - Primary     Diabetes is worsening.   Reminded to bring in blood sugar diary at next visit.  Dietary recommendations for ADA diet.  Regular aerobic exercise.  Medication changes per orders. Increase metformin to 1000 mg BID. Pt will call if she develops diarrhea with increased dose.  Diabetes will be reassessed in 3 months.         Relevant Medications    metFORMIN ER (GLUMETZA) 1000 MG (MOD) 24 hr tablet    Other Relevant Orders    POC Glycosylated Hemoglobin (Hb A1C) (Completed)    POCT Glucose (Completed)               Return in about 3 months (around 9/25/2019) for Recheck.

## 2019-06-28 ENCOUNTER — TELEPHONE (OUTPATIENT)
Dept: INTERNAL MEDICINE | Facility: CLINIC | Age: 74
End: 2019-06-28

## 2019-06-28 DIAGNOSIS — E11.9 TYPE 2 DIABETES MELLITUS WITHOUT COMPLICATION, WITHOUT LONG-TERM CURRENT USE OF INSULIN (HCC): ICD-10-CM

## 2019-06-28 RX ORDER — METFORMIN HYDROCHLORIDE 500 MG/1
1000 TABLET, EXTENDED RELEASE ORAL 2 TIMES DAILY
Qty: 360 TABLET | Refills: 1 | Status: SHIPPED | OUTPATIENT
Start: 2019-06-28 | End: 2019-12-26 | Stop reason: SDUPTHER

## 2019-06-28 NOTE — ASSESSMENT & PLAN NOTE
Diabetes is worsening.   Reminded to bring in blood sugar diary at next visit.  Dietary recommendations for ADA diet.  Regular aerobic exercise.  Medication changes per orders. Increase metformin to 1000 mg BID. Pt will call if she develops diarrhea with increased dose.  Diabetes will be reassessed in 3 months.

## 2019-06-28 NOTE — TELEPHONE ENCOUNTER
When pt was here on Monday, karina wrote a new prescriptions for metformin er for 1000 mg.    The pharmacy said this medication will cost her around $2000 a month.    Pt is requesting the original prescription for Metformin HCL er tabs 500 mg at 4 times per day be sent back in.    [facility] told the patient that a new prescription will have to be called in.    If you have any questions, please contact the patient at [phone]

## 2019-08-07 DIAGNOSIS — I10 ESSENTIAL HYPERTENSION: ICD-10-CM

## 2019-08-07 RX ORDER — HYDROCHLOROTHIAZIDE 25 MG/1
TABLET ORAL
Qty: 90 TABLET | Refills: 0 | Status: SHIPPED | OUTPATIENT
Start: 2019-08-07 | End: 2019-09-30 | Stop reason: SDUPTHER

## 2019-09-19 ENCOUNTER — OFFICE VISIT (OUTPATIENT)
Dept: INTERNAL MEDICINE | Facility: CLINIC | Age: 74
End: 2019-09-19

## 2019-09-19 VITALS
BODY MASS INDEX: 27.77 KG/M2 | WEIGHT: 161.8 LBS | OXYGEN SATURATION: 99 % | DIASTOLIC BLOOD PRESSURE: 80 MMHG | SYSTOLIC BLOOD PRESSURE: 120 MMHG | HEART RATE: 66 BPM

## 2019-09-19 DIAGNOSIS — L72.3 SEBACEOUS CYST OF RIGHT AXILLA: ICD-10-CM

## 2019-09-19 DIAGNOSIS — E11.65 TYPE 2 DIABETES MELLITUS WITH HYPERGLYCEMIA, WITHOUT LONG-TERM CURRENT USE OF INSULIN (HCC): Primary | ICD-10-CM

## 2019-09-19 LAB
GLUCOSE BLDC GLUCOMTR-MCNC: 121 MG/DL (ref 70–130)
HBA1C MFR BLD: 6.5 %

## 2019-09-19 PROCEDURE — 99214 OFFICE O/P EST MOD 30 MIN: CPT | Performed by: PHYSICIAN ASSISTANT

## 2019-09-19 PROCEDURE — 82947 ASSAY GLUCOSE BLOOD QUANT: CPT | Performed by: PHYSICIAN ASSISTANT

## 2019-09-19 PROCEDURE — 83036 HEMOGLOBIN GLYCOSYLATED A1C: CPT | Performed by: PHYSICIAN ASSISTANT

## 2019-09-19 NOTE — PROGRESS NOTES
Chief Complaint   Patient presents with   • Anemia     FOLLOW UP   • Diabetes     LAST A1C 7.3   • Hyperlipidemia       Subjective   Mariaelena Easley is a 74 y.o. female.       History of Present Illness     Pt increased her dose of metformin and has been watching carbohydrate intake. Since increasing the dose she has had some stomach discomfort and diarrhea. She has been taking 1000 mg BID and takes it at lunch and dinner. That seems to have helped with her stomach upset. She has lost 15 lbs since January. She is not eating as much as she was eating.    She has not had to take any of her silent migraine medication since her last visit.    Notes that the cystic lesions in her right axilla/side seem to be getting larger. Has had normal mammograms and lesions have been unchanged.      Current Outpatient Medications:   •  amLODIPine (NORVASC) 5 MG tablet, Take 1 tablet by mouth Daily., Disp: 90 tablet, Rfl: 1  •  atorvastatin (LIPITOR) 80 MG tablet, Take 1 tablet by mouth Daily., Disp: 30 tablet, Rfl: 11  •  Biotin w/ Vitamins C & E (HAIR SKIN & NAILS GUMMIES PO), Take 2 tablets by mouth Daily., Disp: , Rfl:   •  Calcium 500-100 MG-UNIT chewable tablet, Chew 1 tablet Every 4 (Four) Hours As Needed., Disp: , Rfl:   •  Coenzyme Q10 30 MG/5ML liquid, Take  by mouth Daily., Disp: , Rfl:   •  glucose blood (ONETOUCH VERIO) test strip, Use as instructed, Disp: 100 each, Rfl: 5  •  hydrochlorothiazide (HYDRODIURIL) 25 MG tablet, TAKE 1 TABLET DAILY, Disp: 90 tablet, Rfl: 0  •  metFORMIN (GLUCOPHAGE-XR) 500 MG 24 hr tablet, Take 2 tablets by mouth 2 (Two) Times a Day., Disp: 360 tablet, Rfl: 1  •  ONETOUCH DELICA LANCETS 33G misc, Use as directed once daily as needed., Disp: 100 each, Rfl: 3  •  promethazine (PHENERGAN) 25 MG tablet, Take 1 tablet by mouth Every 6 (Six) Hours As Needed for Nausea or Vomiting., Disp: 20 tablet, Rfl: 0  •  rizatriptan MLT (MAXALT-MLT) 10 MG disintegrating tablet, Take 1 tablet by mouth 1 (One)  Time As Needed for Migraine for up to 1 dose. May repeat in 2 hours if needed, Disp: 9 tablet, Rfl: 5  •  Vitamins A & D (VITAMIN A & D PO), Take 2 tablets by mouth Daily., Disp: , Rfl:      PMFSH  The following portions of the patient's history were reviewed and updated as appropriate: allergies, current medications, past family history, past medical history, past social history, past surgical history and problem list.    Review of Systems   Constitutional: Negative for activity change, appetite change and fatigue.   HENT: Negative for congestion and rhinorrhea.    Respiratory: Negative for chest tightness and shortness of breath.    Cardiovascular: Negative for chest pain and palpitations.   Gastrointestinal: Negative for abdominal pain.   Genitourinary: Negative for dysuria.   Musculoskeletal: Negative for arthralgias and myalgias.   Neurological: Negative for dizziness, weakness, light-headedness and headaches.   Psychiatric/Behavioral: Negative for dysphoric mood. The patient is not nervous/anxious.        Objective   /80   Pulse 66   Wt 73.4 kg (161 lb 12.8 oz)   LMP  (LMP Unknown)   SpO2 99%   BMI 27.77 kg/m²     Physical Exam   Constitutional: She appears well-developed and well-nourished.   HENT:   Head: Normocephalic.   Right Ear: Hearing, tympanic membrane, external ear and ear canal normal.   Left Ear: Hearing, tympanic membrane, external ear and ear canal normal.   Nose: Nose normal.   Mouth/Throat: Oropharynx is clear and moist.   Eyes: Conjunctivae are normal. Pupils are equal, round, and reactive to light.   Neck: Normal range of motion.   Cardiovascular: Normal rate, regular rhythm and normal heart sounds.   Pulmonary/Chest: Effort normal and breath sounds normal. She has no decreased breath sounds. She has no wheezes. She has no rhonchi. She has no rales.   Musculoskeletal: Normal range of motion.   Neurological: She is alert.   Skin: Skin is warm and dry.   Right flank/lower axilla,  along bra line: 1-2 cm oval shaped, mobile lesion   Psychiatric: She has a normal mood and affect. Her behavior is normal.   Nursing note and vitals reviewed.      Results for orders placed or performed in visit on 09/19/19   POC Glycosylated Hemoglobin (Hb A1C)   Result Value Ref Range    Hemoglobin A1C 6.5 %   POC Glucose   Result Value Ref Range    Glucose 121 70 - 130 mg/dL        ASSESSMENT/PLAN    Problem List Items Addressed This Visit        Endocrine    Diabetes mellitus (CMS/[facility]) - Primary     Diabetes is improving with treatment.   Continue current treatment regimen.  Reminded to bring in blood sugar diary at next visit.  Dietary recommendations for ADA diet.  Regular aerobic exercise.  Diabetes will be reassessed in 3 months.         Relevant Orders    POC Glycosylated Hemoglobin (Hb A1C) (Completed)    POC Glucose (Completed)      Other Visit Diagnoses     Sebaceous cyst of right axilla        Check US of lesion due to possible enlargement.    Relevant Orders    US Axilla Right               Return in about 3 months (around 12/19/2019) for Recheck.

## 2019-09-26 ENCOUNTER — HOSPITAL ENCOUNTER (OUTPATIENT)
Dept: ULTRASOUND IMAGING | Facility: HOSPITAL | Age: 74
Discharge: HOME | End: 2019-09-26
Admitting: PHYSICIAN ASSISTANT

## 2019-09-26 DIAGNOSIS — L72.3 SEBACEOUS CYST OF RIGHT AXILLA: ICD-10-CM

## 2019-09-26 PROCEDURE — 76882 US LMTD JT/FCL EVL NVASC XTR: CPT

## 2019-09-29 NOTE — PROGRESS NOTES
Please let her know that the ultrasound showed a lipoma- a benign, small collection of fat- and is not worrisome.

## 2019-09-30 DIAGNOSIS — I10 ESSENTIAL HYPERTENSION: ICD-10-CM

## 2019-09-30 RX ORDER — HYDROCHLOROTHIAZIDE 25 MG/1
25 TABLET ORAL DAILY
Qty: 90 TABLET | Refills: 0 | Status: SHIPPED | OUTPATIENT
Start: 2019-09-30 | End: 2019-12-26 | Stop reason: SDUPTHER

## 2019-11-18 ENCOUNTER — OFFICE VISIT (OUTPATIENT)
Dept: CARDIOLOGY | Facility: CLINIC | Age: 74
End: 2019-11-18

## 2019-11-18 VITALS
HEIGHT: 64 IN | SYSTOLIC BLOOD PRESSURE: 120 MMHG | WEIGHT: 166 LBS | HEART RATE: 70 BPM | DIASTOLIC BLOOD PRESSURE: 74 MMHG | BODY MASS INDEX: 28.34 KG/M2 | OXYGEN SATURATION: 98 %

## 2019-11-18 DIAGNOSIS — E78.5 HYPERLIPIDEMIA, UNSPECIFIED HYPERLIPIDEMIA TYPE: ICD-10-CM

## 2019-11-18 DIAGNOSIS — R07.9 CHEST PAIN, UNSPECIFIED TYPE: Primary | ICD-10-CM

## 2019-11-18 DIAGNOSIS — I10 ESSENTIAL HYPERTENSION: ICD-10-CM

## 2019-11-18 PROCEDURE — 99213 OFFICE O/P EST LOW 20 MIN: CPT | Performed by: INTERNAL MEDICINE

## 2019-11-18 RX ORDER — ATORVASTATIN CALCIUM 80 MG/1
80 TABLET, FILM COATED ORAL DAILY
Qty: 90 TABLET | Refills: 3 | Status: SHIPPED | OUTPATIENT
Start: 2019-11-18 | End: 2020-03-10

## 2019-11-18 NOTE — PROGRESS NOTES
Subjective:     Encounter Date:2019      Patient ID: Mariaelena Easley is a 74 y.o. female.    Chief Complaint: Coronary Artery Disease      PROBLEM LIST:    1. Chest pain  a. Myocardial perfusion study 18 EF of 66% very small sized, mildly severe area of ischemia in the lateral wall.  Low risk study. Read by Dr. Buckley.  2. Hypertension  3. Dyslipidemia  4. Borderline diabetes  5. Eczema  6. Surgeries:  a. Bilateral breast augmentation  b. Right breast biopsy  c.       History of Present Illness  Mariaelena Easley returns today for an annual follow up with a history of chest pain, hypertension, and dyslipidemia. Since last visit she has been doing well from a cardiac stanpoint. She admits she has not been physically active. She reports episodes poc chest pain that is intermittent. She reports she has also been experiencing sharp pains in her temples. Denies any shortness of breath, orthopnea, PND, or palpitations.    Allergies   Allergen Reactions   • Codeine    • Sulfa Antibiotics          Current Outpatient Medications:   •  amLODIPine (NORVASC) 5 MG tablet, Take 1 tablet by mouth Daily., Disp: 90 tablet, Rfl: 1  •  Ascorbic Acid (NEVILLE-C PO), Take  by mouth Daily., Disp: , Rfl:   •  atorvastatin (LIPITOR) 80 MG tablet, Take 1 tablet by mouth Daily., Disp: 30 tablet, Rfl: 11  •  Biotin w/ Vitamins C & E (HAIR SKIN & NAILS GUMMIES PO), Take 2 tablets by mouth Daily., Disp: , Rfl:   •  Calcium 500-100 MG-UNIT chewable tablet, Chew 1 tablet Daily., Disp: , Rfl:   •  Coenzyme Q10 30 MG/5ML liquid, Take  by mouth Daily., Disp: , Rfl:   •  glucose blood (ONETOUCH VERIO) test strip, USE TO TEST 2 TO 3 TIMES DAILY, Disp: 100 each, Rfl: 0  •  hydroCHLOROthiazide (HYDRODIURIL) 25 MG tablet, Take 1 tablet by mouth Daily., Disp: 90 tablet, Rfl: 0  •  metFORMIN (GLUCOPHAGE-XR) 500 MG 24 hr tablet, Take 2 tablets by mouth 2 (Two) Times a Day. (Patient taking differently: Take 2,000 mg by mouth 2 (Two) Times a  "Day.), Disp: 360 tablet, Rfl: 1  •  Multiple Vitamin (MULTI-VITAMIN DAILY PO), Take  by mouth Daily., Disp: , Rfl:   •  ONETOUCH DELICA LANCETS 33G misc, Use as directed once daily as needed., Disp: 100 each, Rfl: 3  •  promethazine (PHENERGAN) 25 MG tablet, Take 1 tablet by mouth Every 6 (Six) Hours As Needed for Nausea or Vomiting., Disp: 20 tablet, Rfl: 0  •  rizatriptan MLT (MAXALT-MLT) 10 MG disintegrating tablet, Take 1 tablet by mouth 1 (One) Time As Needed for Migraine for up to 1 dose. May repeat in 2 hours if needed, Disp: 9 tablet, Rfl: 5  •  VITAMIN E PO, Take  by mouth Daily., Disp: , Rfl:   •  Vitamins A & D (VITAMIN A & D PO), Take 2 tablets by mouth Daily., Disp: , Rfl:     The following portions of the patient's history were reviewed and updated as appropriate: allergies, current medications, past family history, past medical history, past social history, past surgical history and problem list.    Review of Systems   Constitution: Positive for decreased appetite and night sweats.   Cardiovascular: Positive for chest pain. Negative for dyspnea on exertion, irregular heartbeat, leg swelling, orthopnea, palpitations and syncope.   Respiratory: Negative.  Negative for shortness of breath.    Endocrine: Positive for polyuria.   Hematologic/Lymphatic: Negative for bleeding problem. Bruises/bleeds easily.   Skin: Positive for dry skin. Negative for rash.   Musculoskeletal: Negative for muscle weakness and myalgias.   Gastrointestinal: Positive for diarrhea (soft stool). Negative for heartburn, nausea and vomiting.   Neurological: Positive for dizziness (if she gets up too quickly).          Objective:     Vitals:    11/18/19 1304   BP: 120/74   BP Location: Right arm   Patient Position: Sitting   Pulse: 70   SpO2: 98%   Weight: 75.3 kg (166 lb)   Height: 162.6 cm (64\")         Physical Exam   Constitutional: She is oriented to person, place, and time. She appears well-developed and well-nourished.   HENT: "   Mouth/Throat: Oropharynx is clear and moist.   Neck: No JVD present. Carotid bruit is not present. No thyromegaly present.   Cardiovascular: Regular rhythm, S1 normal, S2 normal, normal heart sounds and intact distal pulses. Exam reveals no gallop, no S3 and no S4.   No murmur heard.  Pulses:       Carotid pulses are 2+ on the right side, and 2+ on the left side.       Radial pulses are 2+ on the right side, and 2+ on the left side.   Pulmonary/Chest: Breath sounds normal.   Abdominal: Soft. Bowel sounds are normal. She exhibits no mass. There is no tenderness.   Musculoskeletal: She exhibits no edema.   Neurological: She is alert and oriented to person, place, and time.   Skin: Skin is warm and dry. No rash noted.       Lab Review:  Lab Results   Component Value Date    GLUCOSE 133 (H) 03/25/2019    BUN 22 03/25/2019    CREATININE 0.68 03/25/2019    EGFRIFNONA 85 03/25/2019    BCR 32.4 (H) 03/25/2019    K 4.0 03/25/2019    CO2 30.0 03/25/2019    CALCIUM 9.8 03/25/2019    ALBUMIN 4.71 03/25/2019    AST 19 03/25/2019    ALT 24 03/25/2019     Lab Results   Component Value Date    CHOL 172 03/25/2019    CHLPL 230 (H) 10/08/2015    TRIG 103 03/25/2019    HDL 57 03/25/2019     03/25/2019      Lab Results   Component Value Date    WBC 7.20 03/16/2018    HGB 12.9 03/16/2018    HCT 40.2 03/16/2018    MCV 87.2 03/16/2018     03/16/2018         Procedures        Assessment:   Mariaelena was seen today for coronary artery disease.    Diagnoses and all orders for this visit:    Chest pain, unspecified type    Essential hypertension    Hyperlipidemia, unspecified hyperlipidemia type        Impression:  1. Coronary artery disease, mildly abnormal perfusion study last year.  No exertional angina  2. Chest pain, nonexertional, atypical  3. Hypertension, well controlled on Hydrochlorothiazide 25 mg  4. Hyperlipidemia,  on atorvastatin 80 mg.    Plan:  1. Stable cardiac status.  2. Schedule stress test for next visit to  follow-up on previous abnormality.  3. Continue current medications.  4. Revisit in 12 MO, or sooner as needed.    Scribed for Raymond Mcdaniel MD by Peyton Chaney. 11/18/2019  1:41 PM    Raymond Mcdaniel MD      Please note that portions of this note may have been completed with a voice recognition program. Efforts were made to edit the dictations, but occasionally words are mistranscribed.

## 2019-12-13 ENCOUNTER — OFFICE VISIT (OUTPATIENT)
Dept: INTERNAL MEDICINE | Facility: CLINIC | Age: 74
End: 2019-12-13

## 2019-12-13 VITALS
TEMPERATURE: 98.4 F | SYSTOLIC BLOOD PRESSURE: 120 MMHG | OXYGEN SATURATION: 98 % | RESPIRATION RATE: 16 BRPM | DIASTOLIC BLOOD PRESSURE: 72 MMHG | HEART RATE: 61 BPM | WEIGHT: 165 LBS | BODY MASS INDEX: 28.17 KG/M2 | HEIGHT: 64 IN

## 2019-12-13 DIAGNOSIS — I10 ESSENTIAL HYPERTENSION: ICD-10-CM

## 2019-12-13 DIAGNOSIS — E78.5 HYPERLIPIDEMIA, UNSPECIFIED HYPERLIPIDEMIA TYPE: ICD-10-CM

## 2019-12-13 DIAGNOSIS — E11.65 TYPE 2 DIABETES MELLITUS WITH HYPERGLYCEMIA, WITHOUT LONG-TERM CURRENT USE OF INSULIN (HCC): Primary | ICD-10-CM

## 2019-12-13 LAB
ALBUMIN SERPL-MCNC: 4.3 G/DL (ref 3.5–5.2)
ALBUMIN/GLOB SERPL: 1.3 G/DL
ALP SERPL-CCNC: 87 U/L (ref 39–117)
ALT SERPL W P-5'-P-CCNC: 31 U/L (ref 1–33)
ANION GAP SERPL CALCULATED.3IONS-SCNC: 10.4 MMOL/L (ref 5–15)
AST SERPL-CCNC: 20 U/L (ref 1–32)
BILIRUB SERPL-MCNC: 0.7 MG/DL (ref 0.2–1.2)
BUN BLD-MCNC: 13 MG/DL (ref 8–23)
BUN/CREAT SERPL: 19.4 (ref 7–25)
CALCIUM SPEC-SCNC: 9.7 MG/DL (ref 8.6–10.5)
CHLORIDE SERPL-SCNC: 99 MMOL/L (ref 98–107)
CHOLEST SERPL-MCNC: 157 MG/DL (ref 0–200)
CO2 SERPL-SCNC: 30.6 MMOL/L (ref 22–29)
CREAT BLD-MCNC: 0.67 MG/DL (ref 0.57–1)
GFR SERPL CREATININE-BSD FRML MDRD: 87 ML/MIN/1.73
GLOBULIN UR ELPH-MCNC: 3.4 GM/DL
GLUCOSE BLD-MCNC: 117 MG/DL (ref 65–99)
HBA1C MFR BLD: 6.7 %
HDLC SERPL-MCNC: 60 MG/DL (ref 40–60)
LDLC SERPL CALC-MCNC: 83 MG/DL (ref 0–100)
LDLC/HDLC SERPL: 1.38 {RATIO}
POTASSIUM BLD-SCNC: 4.2 MMOL/L (ref 3.5–5.2)
PROT SERPL-MCNC: 7.7 G/DL (ref 6–8.5)
SODIUM BLD-SCNC: 140 MMOL/L (ref 136–145)
TRIGL SERPL-MCNC: 72 MG/DL (ref 0–150)
VLDLC SERPL-MCNC: 14.4 MG/DL (ref 5–40)

## 2019-12-13 PROCEDURE — 83036 HEMOGLOBIN GLYCOSYLATED A1C: CPT | Performed by: PHYSICIAN ASSISTANT

## 2019-12-13 PROCEDURE — 80061 LIPID PANEL: CPT | Performed by: PHYSICIAN ASSISTANT

## 2019-12-13 PROCEDURE — 99214 OFFICE O/P EST MOD 30 MIN: CPT | Performed by: PHYSICIAN ASSISTANT

## 2019-12-13 PROCEDURE — 80053 COMPREHEN METABOLIC PANEL: CPT | Performed by: PHYSICIAN ASSISTANT

## 2019-12-13 NOTE — PROGRESS NOTES
Chief Complaint   Patient presents with   • Diabetes   • Hyperlipidemia   • Anemia       Subjective   Mariaelena Easley is a 74 y.o. female.       History of Present Illness     Pt has gained a little bit of weight back since she was here last. Notes that she has had recurrence of her intermittent diarrhea, has had some incontinence with it. Has had this happen before and her  is also experiencing the same symptoms. She is careful to take her metformin with food. Has tried immodium prn and it does help some. Currently stools are more formed but she has not determined a trigger that causes diarrhea.    Taking meds as directed. Feeling well overall.         Current Outpatient Medications:   •  amLODIPine (NORVASC) 5 MG tablet, Take 1 tablet by mouth Daily., Disp: 90 tablet, Rfl: 1  •  Ascorbic Acid (NEVILLE-C PO), Take  by mouth Daily., Disp: , Rfl:   •  atorvastatin (LIPITOR) 80 MG tablet, Take 1 tablet by mouth Daily., Disp: 90 tablet, Rfl: 3  •  Biotin w/ Vitamins C & E (HAIR SKIN & NAILS GUMMIES PO), Take 2 tablets by mouth Daily., Disp: , Rfl:   •  Calcium 500-100 MG-UNIT chewable tablet, Chew 1 tablet Daily., Disp: , Rfl:   •  Coenzyme Q10 30 MG/5ML liquid, Take  by mouth Daily., Disp: , Rfl:   •  glucose blood (ONETOUCH VERIO) test strip, USE TO TEST 2 TO 3 TIMES DAILY, Disp: 100 each, Rfl: 0  •  hydroCHLOROthiazide (HYDRODIURIL) 25 MG tablet, Take 1 tablet by mouth Daily., Disp: 90 tablet, Rfl: 0  •  metFORMIN (GLUCOPHAGE-XR) 500 MG 24 hr tablet, Take 2 tablets by mouth 2 (Two) Times a Day. (Patient taking differently: Take 2,000 mg by mouth 2 (Two) Times a Day.), Disp: 360 tablet, Rfl: 1  •  Multiple Vitamin (MULTI-VITAMIN DAILY PO), Take  by mouth Daily., Disp: , Rfl:   •  ONETOUCH DELICA LANCETS 33G misc, Use as directed once daily as needed., Disp: 100 each, Rfl: 3  •  promethazine (PHENERGAN) 25 MG tablet, Take 1 tablet by mouth Every 6 (Six) Hours As Needed for Nausea or Vomiting., Disp: 20 tablet,  "Rfl: 0  •  rizatriptan MLT (MAXALT-MLT) 10 MG disintegrating tablet, Take 1 tablet by mouth 1 (One) Time As Needed for Migraine for up to 1 dose. May repeat in 2 hours if needed, Disp: 9 tablet, Rfl: 5  •  VITAMIN E PO, Take  by mouth Daily., Disp: , Rfl:   •  Vitamins A & D (VITAMIN A & D PO), Take 2 tablets by mouth Daily., Disp: , Rfl:      PMFSH  The following portions of the patient's history were reviewed and updated as appropriate: allergies, current medications, past family history, past medical history, past social history, past surgical history and problem list.    Review of Systems   Constitutional: Negative for activity change, appetite change and fatigue.   HENT: Negative for congestion and rhinorrhea.    Respiratory: Negative for chest tightness and shortness of breath.    Cardiovascular: Negative for chest pain and palpitations.   Gastrointestinal: Positive for diarrhea. Negative for abdominal pain, anal bleeding, blood in stool, nausea and vomiting.   Genitourinary: Negative for dysuria.   Musculoskeletal: Negative for arthralgias and myalgias.   Neurological: Negative for dizziness, weakness, light-headedness and headaches.   Psychiatric/Behavioral: Negative for dysphoric mood. The patient is not nervous/anxious.        Objective   /72   Pulse 61   Temp 98.4 °F (36.9 °C)   Resp 16   Ht 162.6 cm (64\")   Wt 74.8 kg (165 lb)   LMP  (LMP Unknown)   SpO2 98%   Breastfeeding No   BMI 28.32 kg/m²     Physical Exam   Constitutional: She appears well-developed and well-nourished.   HENT:   Head: Normocephalic.   Right Ear: Hearing, tympanic membrane, external ear and ear canal normal.   Left Ear: Hearing, tympanic membrane, external ear and ear canal normal.   Nose: Nose normal.   Mouth/Throat: Oropharynx is clear and moist.   Eyes: Pupils are equal, round, and reactive to light. Conjunctivae are normal.   Neck: Normal range of motion.   Cardiovascular: Normal rate, regular rhythm and normal " heart sounds.   Pulmonary/Chest: Effort normal and breath sounds normal. She has no decreased breath sounds. She has no wheezes. She has no rhonchi. She has no rales.   Musculoskeletal: Normal range of motion.   Neurological: She is alert.   Skin: Skin is warm and dry.   Psychiatric: She has a normal mood and affect. Her behavior is normal.   Nursing note and vitals reviewed.           ASSESSMENT/PLAN    Problem List Items Addressed This Visit        Cardiovascular and Mediastinum    Hyperlipidemia    Relevant Orders    Comprehensive Metabolic Panel (Completed)    Lipid Panel (Completed)    Essential hypertension     Hypertension is unchanged.  Continue current treatment regimen.  Dietary sodium restriction.  Weight loss.  Regular aerobic exercise.  Continue current medications.  Blood pressure will be reassessed at the next regular appointment.            Endocrine    Diabetes mellitus (CMS/[facility]) - Primary     Diabetes is worsening.   Continue current treatment regimen.  Reminded to bring in blood sugar diary at next visit.  Dietary recommendations for ADA diet.  Regular aerobic exercise.  Diabetes will be reassessed in 3 months.         Relevant Orders    Comprehensive Metabolic Panel (Completed)    Lipid Panel (Completed)    POC Glycosylated Hemoglobin (Hb A1C) (Completed)               Return in about 3 months (around 3/13/2020) for Medicare Wellness.

## 2019-12-16 NOTE — ASSESSMENT & PLAN NOTE
Diabetes is worsening.   Continue current treatment regimen.  Reminded to bring in blood sugar diary at next visit.  Dietary recommendations for ADA diet.  Regular aerobic exercise.  Diabetes will be reassessed in 3 months.

## 2019-12-26 ENCOUNTER — TELEPHONE (OUTPATIENT)
Dept: INTERNAL MEDICINE | Facility: CLINIC | Age: 74
End: 2019-12-26

## 2019-12-26 DIAGNOSIS — E11.9 TYPE 2 DIABETES MELLITUS WITHOUT COMPLICATION, WITHOUT LONG-TERM CURRENT USE OF INSULIN (HCC): ICD-10-CM

## 2019-12-26 DIAGNOSIS — I10 ESSENTIAL HYPERTENSION: ICD-10-CM

## 2019-12-26 RX ORDER — HYDROCHLOROTHIAZIDE 25 MG/1
25 TABLET ORAL DAILY
Qty: 90 TABLET | Refills: 0 | Status: SHIPPED | OUTPATIENT
Start: 2019-12-26 | End: 2020-04-10 | Stop reason: SDUPTHER

## 2019-12-26 RX ORDER — AMLODIPINE BESYLATE 5 MG/1
5 TABLET ORAL DAILY
Qty: 90 TABLET | Refills: 0 | Status: SHIPPED | OUTPATIENT
Start: 2019-12-26 | End: 2020-04-10 | Stop reason: SDUPTHER

## 2019-12-26 RX ORDER — METFORMIN HYDROCHLORIDE 500 MG/1
2000 TABLET, EXTENDED RELEASE ORAL 2 TIMES DAILY
Qty: 720 TABLET | Refills: 0 | Status: SHIPPED | OUTPATIENT
Start: 2019-12-26 | End: 2019-12-30 | Stop reason: SDUPTHER

## 2019-12-26 NOTE — TELEPHONE ENCOUNTER
Patient is calling requesting refills on 3 of her medications:    amLODIPine (NORVASC) 5 MG tablet  hydroCHLOROthiazide (HYDRODIURIL) 25 MG tablet  metFORMIN (GLUCOPHAGE-XR) 500 MG 24 hr tablet    Confirmed [facility]

## 2019-12-30 DIAGNOSIS — E11.9 TYPE 2 DIABETES MELLITUS WITHOUT COMPLICATION, WITHOUT LONG-TERM CURRENT USE OF INSULIN (HCC): ICD-10-CM

## 2019-12-30 RX ORDER — METFORMIN HYDROCHLORIDE 500 MG/1
TABLET, EXTENDED RELEASE ORAL
Qty: 360 TABLET | Refills: 0 | Status: SHIPPED | OUTPATIENT
Start: 2019-12-30 | End: 2020-04-10 | Stop reason: SDUPTHER

## 2020-03-10 DIAGNOSIS — R07.9 CHEST PAIN, UNSPECIFIED TYPE: ICD-10-CM

## 2020-03-10 DIAGNOSIS — R94.39 ABNORMAL STRESS TEST: Primary | ICD-10-CM

## 2020-03-10 RX ORDER — ATORVASTATIN CALCIUM 80 MG/1
TABLET, FILM COATED ORAL
Qty: 30 TABLET | Refills: 11 | Status: SHIPPED | OUTPATIENT
Start: 2020-03-10 | End: 2020-09-03 | Stop reason: SDUPTHER

## 2020-03-11 ENCOUNTER — TELEPHONE (OUTPATIENT)
Dept: CARDIOLOGY | Facility: CLINIC | Age: 75
End: 2020-03-11

## 2020-03-11 NOTE — TELEPHONE ENCOUNTER
Left VM advising patient that according to Dr. Mcdaniel's Nov 20109 note, her previous study was abnormal and that is why he would like to repeat this prior to her visit Nov 2020.

## 2020-03-25 ENCOUNTER — APPOINTMENT (OUTPATIENT)
Dept: CARDIOLOGY | Facility: HOSPITAL | Age: 75
End: 2020-03-25

## 2020-04-09 ENCOUNTER — TELEPHONE (OUTPATIENT)
Dept: INTERNAL MEDICINE | Facility: CLINIC | Age: 75
End: 2020-04-09

## 2020-04-09 DIAGNOSIS — E11.9 TYPE 2 DIABETES MELLITUS WITHOUT COMPLICATION, WITHOUT LONG-TERM CURRENT USE OF INSULIN (HCC): ICD-10-CM

## 2020-04-09 RX ORDER — LANCETS 33 GAUGE
EACH MISCELLANEOUS
Qty: 200 EACH | Refills: 1 | Status: SHIPPED | OUTPATIENT
Start: 2020-04-09 | End: 2020-09-24

## 2020-04-09 NOTE — TELEPHONE ENCOUNTER
Patient called and stated that refills are needed for the following prescription(s):    glucose blood (ONETOUCH VERIO) test strip  ONETOUCH DELICA LANCETS 33G [facility]    Pharmacy: [facility]-confirmed  PH: [phone]  FX: [phone]    Patient callback: [phone]     Please advise

## 2020-04-10 DIAGNOSIS — E11.9 TYPE 2 DIABETES MELLITUS WITHOUT COMPLICATION, WITHOUT LONG-TERM CURRENT USE OF INSULIN (HCC): ICD-10-CM

## 2020-04-10 DIAGNOSIS — I10 ESSENTIAL HYPERTENSION: ICD-10-CM

## 2020-04-10 RX ORDER — AMLODIPINE BESYLATE 5 MG/1
5 TABLET ORAL DAILY
Qty: 90 TABLET | Refills: 0 | Status: SHIPPED | OUTPATIENT
Start: 2020-04-10 | End: 2020-07-06

## 2020-04-10 RX ORDER — HYDROCHLOROTHIAZIDE 25 MG/1
25 TABLET ORAL DAILY
Qty: 90 TABLET | Refills: 0 | Status: SHIPPED | OUTPATIENT
Start: 2020-04-10 | End: 2020-07-06

## 2020-04-10 RX ORDER — METFORMIN HYDROCHLORIDE 500 MG/1
TABLET, EXTENDED RELEASE ORAL
Qty: 360 TABLET | Refills: 0 | Status: SHIPPED | OUTPATIENT
Start: 2020-04-10 | End: 2020-07-06

## 2020-06-04 ENCOUNTER — PATIENT OUTREACH (OUTPATIENT)
Dept: INTERNAL MEDICINE | Facility: CLINIC | Age: 75
End: 2020-06-04

## 2020-06-04 NOTE — OUTREACH NOTE
LVM x2 for pt to return call so that we can schedule their AWV    Nuvia Loomis    Case Management    [phone]

## 2020-06-05 ENCOUNTER — PATIENT OUTREACH (OUTPATIENT)
Dept: INTERNAL MEDICINE | Facility: CLINIC | Age: 75
End: 2020-06-05

## 2020-06-17 ENCOUNTER — HOSPITAL ENCOUNTER (OUTPATIENT)
Dept: CARDIOLOGY | Facility: HOSPITAL | Age: 75
Discharge: HOME | End: 2020-06-17

## 2020-06-17 VITALS — BODY MASS INDEX: 28.34 KG/M2 | HEIGHT: 64 IN | WEIGHT: 166 LBS

## 2020-06-17 DIAGNOSIS — R94.39 ABNORMAL STRESS TEST: ICD-10-CM

## 2020-06-17 DIAGNOSIS — R07.9 CHEST PAIN, UNSPECIFIED TYPE: ICD-10-CM

## 2020-06-17 PROCEDURE — 78452 HT MUSCLE IMAGE SPECT MULT: CPT | Performed by: INTERNAL MEDICINE

## 2020-06-17 PROCEDURE — 93017 CV STRESS TEST TRACING ONLY: CPT

## 2020-06-17 PROCEDURE — 25010000002 REGADENOSON 0.4 MG/5ML SOLUTION: Performed by: INTERNAL MEDICINE

## 2020-06-17 PROCEDURE — 93018 CV STRESS TEST I&R ONLY: CPT | Performed by: INTERNAL MEDICINE

## 2020-06-17 PROCEDURE — 0 TECHNETIUM SESTAMIBI: Performed by: INTERNAL MEDICINE

## 2020-06-17 PROCEDURE — A9500 TC99M SESTAMIBI: HCPCS | Performed by: INTERNAL MEDICINE

## 2020-06-17 PROCEDURE — 78452 HT MUSCLE IMAGE SPECT MULT: CPT

## 2020-06-17 RX ADMIN — TECHNETIUM TC 99M SESTAMIBI 1 DOSE: 1 INJECTION INTRAVENOUS at 08:50

## 2020-06-17 RX ADMIN — TECHNETIUM TC 99M SESTAMIBI 1 DOSE: 1 INJECTION INTRAVENOUS at 11:13

## 2020-06-17 RX ADMIN — REGADENOSON 0.4 MG: 0.08 INJECTION, SOLUTION INTRAVENOUS at 11:14

## 2020-06-18 LAB
BH CV NUCLEAR PRIOR STUDY: 1
BH CV STRESS BP STAGE 1: NORMAL
BH CV STRESS BP STAGE 2: NORMAL
BH CV STRESS COMMENTS STAGE 1: NORMAL
BH CV STRESS COMMENTS STAGE 2: NORMAL
BH CV STRESS DOSE REGADENOSON STAGE 1: 0.4
BH CV STRESS DURATION MIN STAGE 1: 4
BH CV STRESS DURATION MIN STAGE 2: 3
BH CV STRESS DURATION SEC STAGE 1: 0
BH CV STRESS DURATION SEC STAGE 2: 26
BH CV STRESS HR STAGE 1: 111
BH CV STRESS HR STAGE 2: 105
BH CV STRESS PROTOCOL 1: NORMAL
BH CV STRESS RECOVERY BP: NORMAL MMHG
BH CV STRESS RECOVERY HR: 103 BPM
BH CV STRESS STAGE 1: 1
BH CV STRESS STAGE 2: 2
LV EF NUC BP: 57 %
MAXIMAL PREDICTED HEART RATE: 147 BPM
PERCENT MAX PREDICTED HR: 78.23 %
STRESS BASELINE BP: NORMAL MMHG
STRESS BASELINE HR: 73 BPM
STRESS PERCENT HR: 92 %
STRESS POST PEAK BP: NORMAL MMHG
STRESS POST PEAK HR: 115 BPM
STRESS TARGET HR: 125 BPM

## 2020-06-19 ENCOUNTER — TELEPHONE (OUTPATIENT)
Dept: CARDIOLOGY | Facility: CLINIC | Age: 75
End: 2020-06-19

## 2020-06-19 NOTE — TELEPHONE ENCOUNTER
Spoke with patient, advised of below.    ----- Message from Raymond Mcdaniel MD sent at 6/18/2020  3:49 PM EDT -----  No change in stress test.  As long as she still feels good continue current medications

## 2020-07-03 DIAGNOSIS — I10 ESSENTIAL HYPERTENSION: ICD-10-CM

## 2020-07-03 DIAGNOSIS — E11.9 TYPE 2 DIABETES MELLITUS WITHOUT COMPLICATION, WITHOUT LONG-TERM CURRENT USE OF INSULIN (HCC): ICD-10-CM

## 2020-07-06 RX ORDER — HYDROCHLOROTHIAZIDE 25 MG/1
TABLET ORAL
Qty: 90 TABLET | Refills: 0 | Status: SHIPPED | OUTPATIENT
Start: 2020-07-06 | End: 2020-09-03 | Stop reason: SDUPTHER

## 2020-07-06 RX ORDER — METFORMIN HYDROCHLORIDE 500 MG/1
TABLET, EXTENDED RELEASE ORAL
Qty: 360 TABLET | Refills: 0 | Status: SHIPPED | OUTPATIENT
Start: 2020-07-06 | End: 2020-09-03 | Stop reason: SDUPTHER

## 2020-07-06 RX ORDER — AMLODIPINE BESYLATE 5 MG/1
TABLET ORAL
Qty: 90 TABLET | Refills: 0 | Status: SHIPPED | OUTPATIENT
Start: 2020-07-06 | End: 2020-09-03 | Stop reason: SDUPTHER

## 2020-09-03 ENCOUNTER — OFFICE VISIT (OUTPATIENT)
Dept: INTERNAL MEDICINE | Facility: CLINIC | Age: 75
End: 2020-09-03

## 2020-09-03 VITALS
SYSTOLIC BLOOD PRESSURE: 100 MMHG | HEIGHT: 64 IN | BODY MASS INDEX: 28.2 KG/M2 | DIASTOLIC BLOOD PRESSURE: 68 MMHG | TEMPERATURE: 97.2 F | WEIGHT: 165.2 LBS | HEART RATE: 86 BPM | OXYGEN SATURATION: 91 %

## 2020-09-03 DIAGNOSIS — I10 ESSENTIAL HYPERTENSION: ICD-10-CM

## 2020-09-03 DIAGNOSIS — D64.9 ANEMIA, UNSPECIFIED TYPE: ICD-10-CM

## 2020-09-03 DIAGNOSIS — Z78.0 POSTMENOPAUSAL: ICD-10-CM

## 2020-09-03 DIAGNOSIS — Z83.71 FAMILY HISTORY OF COLONIC POLYPS: ICD-10-CM

## 2020-09-03 DIAGNOSIS — E78.5 HYPERLIPIDEMIA, UNSPECIFIED HYPERLIPIDEMIA TYPE: ICD-10-CM

## 2020-09-03 DIAGNOSIS — Z00.00 ENCOUNTER FOR MEDICARE ANNUAL WELLNESS EXAM: Primary | ICD-10-CM

## 2020-09-03 DIAGNOSIS — D17.20 LIPOMA OF UPPER ARM: ICD-10-CM

## 2020-09-03 DIAGNOSIS — M25.442 FINGER JOINT SWELLING, LEFT: ICD-10-CM

## 2020-09-03 DIAGNOSIS — E07.9 THYROID DISEASE: ICD-10-CM

## 2020-09-03 DIAGNOSIS — E11.9 TYPE 2 DIABETES MELLITUS WITHOUT COMPLICATION, WITHOUT LONG-TERM CURRENT USE OF INSULIN (HCC): ICD-10-CM

## 2020-09-03 DIAGNOSIS — Z12.31 ENCOUNTER FOR SCREENING MAMMOGRAM FOR MALIGNANT NEOPLASM OF BREAST: ICD-10-CM

## 2020-09-03 DIAGNOSIS — Z12.11 COLON CANCER SCREENING: ICD-10-CM

## 2020-09-03 LAB
GLUCOSE BLDC GLUCOMTR-MCNC: 143 MG/DL (ref 70–130)
HBA1C MFR BLD: 6.8 %

## 2020-09-03 PROCEDURE — 99214 OFFICE O/P EST MOD 30 MIN: CPT | Performed by: PHYSICIAN ASSISTANT

## 2020-09-03 PROCEDURE — 83036 HEMOGLOBIN GLYCOSYLATED A1C: CPT | Performed by: PHYSICIAN ASSISTANT

## 2020-09-03 PROCEDURE — 82962 GLUCOSE BLOOD TEST: CPT | Performed by: PHYSICIAN ASSISTANT

## 2020-09-03 PROCEDURE — G0439 PPPS, SUBSEQ VISIT: HCPCS | Performed by: PHYSICIAN ASSISTANT

## 2020-09-03 RX ORDER — HYDROCHLOROTHIAZIDE 25 MG/1
25 TABLET ORAL DAILY
Qty: 90 TABLET | Refills: 3 | Status: SHIPPED | OUTPATIENT
Start: 2020-09-03 | End: 2020-09-24

## 2020-09-03 RX ORDER — ATORVASTATIN CALCIUM 80 MG/1
80 TABLET, FILM COATED ORAL DAILY
Qty: 90 TABLET | Refills: 3 | Status: SHIPPED | OUTPATIENT
Start: 2020-09-03 | End: 2021-06-21 | Stop reason: SDUPTHER

## 2020-09-03 RX ORDER — METFORMIN HYDROCHLORIDE 500 MG/1
TABLET, EXTENDED RELEASE ORAL
Qty: 360 TABLET | Refills: 3 | Status: SHIPPED | OUTPATIENT
Start: 2020-09-03 | End: 2020-09-24

## 2020-09-03 RX ORDER — AMLODIPINE BESYLATE 5 MG/1
5 TABLET ORAL DAILY
Qty: 90 TABLET | Refills: 3 | Status: SHIPPED | OUTPATIENT
Start: 2020-09-03 | End: 2020-09-24

## 2020-09-03 NOTE — PROGRESS NOTES
The ABCs of the Annual Wellness Visit  Subsequent Medicare Wellness Visit    Chief Complaint   Patient presents with   • Medicare Wellness-subsequent       Subjective   History of Present Illness:  Mariaelena Easley is a 75 y.o. female who presents for a Subsequent Medicare Wellness Visit.    Several months ago pt noticed a knot on her left 5th digit.     Also has a lump on her right shoulder that has been present for quite a while.         HEALTH RISK ASSESSMENT    Recent Hospitalizations:  No hospitalization(s) within the last year.    Current Medical Providers:  Patient Care Team:  Nuris Cristobal PA as PCP - General (Internal Medicine)    Smoking Status:  Social History     Tobacco Use   Smoking Status Former Smoker   Smokeless Tobacco Never Used       Alcohol Consumption:  Social History     Substance and Sexual Activity   Alcohol Use Yes   • Frequency: Monthly or less       Depression Screen:   PHQ-2/PHQ-9 Depression Screening 9/3/2020   Little interest or pleasure in doing things 0   Feeling down, depressed, or hopeless 0   Trouble falling or staying asleep, or sleeping too much -   Feeling tired or having little energy -   Poor appetite or overeating -   Feeling bad about yourself - or that you are a failure or have let yourself or your family down -   Trouble concentrating on things, such as reading the newspaper or watching television -   Moving or speaking so slowly that other people could have noticed. Or the opposite - being so fidgety or restless that you have been moving around a lot more than usual -   Total Score 0   If you checked off any problems, how difficult have these problems made it for you to do your work, take care of things at home, or get along with other people? -       Fall Risk Screen:  STEADI Fall Risk Assessment was completed, and patient is at LOW risk for falls.Assessment completed on:9/3/2020    Health Habits and Functional and Cognitive Screening:  Functional & Cognitive Status  9/3/2020   Do you have difficulty preparing food and eating? No   Do you have difficulty bathing yourself, getting dressed or grooming yourself? No   Do you have difficulty using the toilet? No   Do you have difficulty moving around from place to place? No   Do you have trouble with steps or getting out of a bed or a chair? No   Current Diet Well Balanced Diet   Dental Exam Up to date   Eye Exam Up to date   Exercise (times per week) 0 times per week   Current Exercise Activities Include None   Do you need help using the phone?  No   Are you deaf or do you have serious difficulty hearing?  No   Do you need help with transportation? No   Do you need help shopping? No   Do you need help preparing meals?  No   Do you need help with housework?  No   Do you need help with laundry? No   Do you need help taking your medications? No   Do you need help managing money? No   Do you ever drive or ride in a car without wearing a seat belt? No   Have you felt unusual stress, anger or loneliness in the last month? Yes   Who do you live with? Spouse   If you need help, do you have trouble finding someone available to you? No   Have you been bothered in the last four weeks by sexual problems? No   Do you have difficulty concentrating, remembering or making decisions? Yes         Does the patient have evidence of cognitive impairment? No    Asprin use counseling:Does not need ASA (and currently is not on it)    Age-appropriate Screening Schedule:  Refer to the list below for future screening recommendations based on patient's age, sex and/or medical conditions. Orders for these recommended tests are listed in the plan section. The patient has been provided with a written plan.    Health Maintenance   Topic Date Due   • DIABETIC EYE EXAM  08/16/2017   • DIABETIC FOOT EXAM  03/25/2020   • URINE MICROALBUMIN  03/25/2020   • DXA SCAN  03/26/2020   • ZOSTER VACCINE (2 of 2) 09/03/2021 (Originally 2/26/2012)   • INFLUENZA VACCINE   10/31/2021 (Originally 8/1/2020)   • LIPID PANEL  12/13/2020   • HEMOGLOBIN A1C  03/03/2021   • MAMMOGRAM  03/25/2021   • TDAP/TD VACCINES (3 - Td) 06/01/2024   • COLONOSCOPY  11/02/2025          The following portions of the patient's history were reviewed and updated as appropriate: allergies, current medications, past family history, past medical history, past social history, past surgical history and problem list.    Outpatient Medications Prior to Visit   Medication Sig Dispense Refill   • Ascorbic Acid (NEVILLE-C PO) Take  by mouth Daily.     • Biotin w/ Vitamins C & E (HAIR SKIN & NAILS GUMMIES PO) Take 2 tablets by mouth Daily.     • Calcium 500-100 MG-UNIT chewable tablet Chew 1 tablet Daily.     • Coenzyme Q10 30 MG/5ML liquid Take  by mouth Daily.     • glucose blood (OneTouch Verio) test strip USE TO TEST 2-3 TIMES DAILY 100 each 0   • Multiple Vitamin (MULTI-VITAMIN DAILY PO) Take  by mouth Daily.     • OneTouch Delica Lancets 33G misc USE LANCET DAILY TO CHECK BLOOD SUGARS 2 TIMES DAILY 200 each 1   • promethazine (PHENERGAN) 25 MG tablet Take 1 tablet by mouth Every 6 (Six) Hours As Needed for Nausea or Vomiting. 20 tablet 0   • rizatriptan MLT (MAXALT-MLT) 10 MG disintegrating tablet Take 1 tablet by mouth 1 (One) Time As Needed for Migraine for up to 1 dose. May repeat in 2 hours if needed 9 tablet 5   • VITAMIN E PO Take  by mouth Daily.     • Vitamins A & D (VITAMIN A & D PO) Take 2 tablets by mouth Daily.     • amLODIPine (NORVASC) 5 MG tablet TAKE 1 TABLET DAILY 90 tablet 0   • atorvastatin (LIPITOR) 80 MG tablet TAKE 1 TABLET BY MOUTH ONCE DAILY 30 tablet 11   • hydroCHLOROthiazide (HYDRODIURIL) 25 MG tablet TAKE 1 TABLET DAILY 90 tablet 0   • metFORMIN ER (GLUCOPHAGE-XR) 500 MG 24 hr tablet TAKE 2 TABLETS EVERY       MORNING AND TAKE 2 TABLETS EVERY EVENING 360 tablet 0     No facility-administered medications prior to visit.        Patient Active Problem List   Diagnosis   • Anemia   •  "Hyperlipidemia   • Migraine headache   • Thyroid disease   • Episodic recurrent vertigo   • Osteopenia   • Seborrheic eczema   • Cyst of uterus   • Cyst of breast   • Essential hypertension   • Diabetes mellitus (CMS/HCC)   • Squamous cell skin cancer, face   • Chest pain       Advanced Care Planning:  ACP discussion was held with the patient during this visit. Patient has an advance directive (not in EMR), copy requested.    Review of Systems   Constitutional: Negative for appetite change, fever and unexpected weight change.   HENT: Negative for ear pain, facial swelling and sore throat.    Eyes: Negative for pain and visual disturbance.   Respiratory: Negative for chest tightness, shortness of breath and wheezing.    Cardiovascular: Negative for chest pain and palpitations.   Gastrointestinal: Negative for abdominal pain and blood in stool.   Endocrine: Negative.    Genitourinary: Negative for difficulty urinating and hematuria.   Musculoskeletal: Negative for joint swelling.   Neurological: Negative for tremors, seizures and syncope.   Hematological: Negative for adenopathy.   Psychiatric/Behavioral: Negative.        Compared to one year ago, the patient feels her physical health is the same.  Compared to one year ago, the patient feels her mental health is the same.    Reviewed chart for potential of high risk medication in the elderly: yes  Reviewed chart for potential of harmful drug interactions in the elderly:yes    Objective         Vitals:    09/03/20 1256   BP: 100/68   Pulse: 86   Temp: 97.2 °F (36.2 °C)   SpO2: 91%   Weight: 74.9 kg (165 lb 3.2 oz)   Height: 162.6 cm (64\")   PainSc: 0-No pain       Body mass index is 28.36 kg/m².  Discussed the patient's BMI with her. The BMI is above average; BMI management plan is completed.    Physical Exam   Constitutional: She is oriented to person, place, and time. She appears well-developed and well-nourished. No distress.   HENT:   Head: Normocephalic and " atraumatic. Hair is normal.   Right Ear: Hearing, tympanic membrane, external ear and ear canal normal. No drainage. No decreased hearing is noted.   Left Ear: Hearing, tympanic membrane, external ear and ear canal normal. No decreased hearing is noted.   Nose: No nasal deformity.   Mouth/Throat: Oropharynx is clear and moist.   Eyes: Pupils are equal, round, and reactive to light. Conjunctivae, EOM and lids are normal. Lids are everted and swept, no foreign bodies found. Right eye exhibits no discharge. Left eye exhibits no discharge.   Fundoscopic exam:       The right eye shows red reflex.        The left eye shows red reflex.   Neck: Normal range of motion. Neck supple. No JVD present. No tracheal deviation present. No thyromegaly present.   Cardiovascular: Normal rate, regular rhythm, normal heart sounds, intact distal pulses and normal pulses. Exam reveals no gallop and no friction rub.   No murmur heard.  Pulmonary/Chest: Effort normal and breath sounds normal. No respiratory distress. She has no wheezes. She has no rales. She exhibits no tenderness.   Abdominal: Soft. Bowel sounds are normal. She exhibits no distension and no mass. There is no tenderness. There is no rebound and no guarding. No hernia.   Musculoskeletal: Normal range of motion. She exhibits no edema, tenderness or deformity.   Lymphadenopathy:     She has no cervical adenopathy.        Right: No inguinal adenopathy present.        Left: No inguinal adenopathy present.   Neurological: She is alert and oriented to person, place, and time. She has normal reflexes. She displays normal reflexes. No cranial nerve deficit. She exhibits normal muscle tone. Coordination normal.   Skin: Skin is warm and dry. No rash noted. She is not diaphoretic. No erythema.        Psychiatric: She has a normal mood and affect. Her behavior is normal. Judgment and thought content normal.   Nursing note and vitals reviewed.      Lab Results   Component Value Date     HGBA1C 6.8 09/03/2020        Assessment/Plan   Medicare Risks and Personalized Health Plan  CMS Preventative Services Quick Reference  Advance Directive Discussion  Breast Cancer/Mammogram Screening  Colon Cancer Screening  Immunizations Discussed/Encouraged (specific immunizations; Influenza and Shingrix )  Inactivity/Sedentary  Obesity/Overweight   Osteoprorosis Risk    The above risks/problems have been discussed with the patient.  Pertinent information has been shared with the patient in the After Visit Summary.  Follow up plans and orders are seen below in the Assessment/Plan Section.    Diagnoses and all orders for this visit:    1. Encounter for Medicare annual wellness exam (Primary)    2. Essential hypertension  Assessment & Plan:  Hypertension is unchanged.  Continue current treatment regimen.  Dietary sodium restriction.  Weight loss.  Regular aerobic exercise.  Blood pressure will be reassessed at the next regular appointment.    Orders:  -     amLODIPine (NORVASC) 5 MG tablet; Take 1 tablet by mouth Daily.  Dispense: 90 tablet; Refill: 3  -     hydroCHLOROthiazide (HYDRODIURIL) 25 MG tablet; Take 1 tablet by mouth Daily.  Dispense: 90 tablet; Refill: 3    3. Type 2 diabetes mellitus without complication, without long-term current use of insulin (CMS/[facility])  Assessment & Plan:  Diabetes is unchanged.   Continue current treatment regimen.  Reminded to bring in blood sugar diary at next visit.  Dietary recommendations for ADA diet.  Regular aerobic exercise.  Diabetes will be reassessed in 3 months.    Orders:  -     metFORMIN ER (GLUCOPHAGE-XR) 500 MG 24 hr tablet; TAKE 2 TABLETS EVERY       MORNING AND TAKE 2 TABLETS EVERY EVENING  Dispense: 360 tablet; Refill: 3  -     POC Glycosylated Hemoglobin (Hb A1C)  -     POC Glucose  -     Comprehensive Metabolic Panel; Future  -     Lipid Panel; Future    4. Hyperlipidemia, unspecified hyperlipidemia type  -     atorvastatin (LIPITOR) 80 MG tablet; Take 1 tablet  by mouth Daily.  Dispense: 90 tablet; Refill: 3  -     Comprehensive Metabolic Panel; Future  -     Lipid Panel; Future    5. Family history of colonic polyps  -     Ambulatory Referral For Screening Colonoscopy    6. Colon cancer screening  -     Ambulatory Referral For Screening Colonoscopy    7. Postmenopausal  -     DEXA Bone Density Axial; Future    8. Encounter for screening mammogram for malignant neoplasm of breast   -     Mammo Screening Digital Tomosynthesis Bilateral With CAD; Future    9. Finger joint swelling, left  -     XR Hand 3+ View Left; Future    10. Lipoma of upper arm  -     US Nonvascular Extremity Limited; Future    11. Thyroid disease  -     TSH; Future    12. Anemia, unspecified type  -     CBC & Differential; Future    Follow Up:  Return in about 3 months (around 12/3/2020) for Follow up, Medicare Wellness in 1 year.     An After Visit Summary and PPPS were given to the patient.

## 2020-09-03 NOTE — ASSESSMENT & PLAN NOTE
Diabetes is unchanged.   Continue current treatment regimen.  Reminded to bring in blood sugar diary at next visit.  Dietary recommendations for ADA diet.  Regular aerobic exercise.  Diabetes will be reassessed in 3 months.

## 2020-09-24 DIAGNOSIS — E11.9 TYPE 2 DIABETES MELLITUS WITHOUT COMPLICATION, WITHOUT LONG-TERM CURRENT USE OF INSULIN (HCC): ICD-10-CM

## 2020-09-24 DIAGNOSIS — I10 ESSENTIAL HYPERTENSION: ICD-10-CM

## 2020-09-24 RX ORDER — METFORMIN HYDROCHLORIDE 500 MG/1
TABLET, EXTENDED RELEASE ORAL
Qty: 360 TABLET | Refills: 0 | Status: SHIPPED | OUTPATIENT
Start: 2020-09-24 | End: 2021-03-27

## 2020-09-24 RX ORDER — HYDROCHLOROTHIAZIDE 25 MG/1
TABLET ORAL
Qty: 90 TABLET | Refills: 0 | Status: SHIPPED | OUTPATIENT
Start: 2020-09-24 | End: 2021-03-27

## 2020-09-24 RX ORDER — AMLODIPINE BESYLATE 5 MG/1
TABLET ORAL
Qty: 90 TABLET | Refills: 0 | Status: SHIPPED | OUTPATIENT
Start: 2020-09-24 | End: 2021-03-27

## 2020-09-24 RX ORDER — BLOOD SUGAR DIAGNOSTIC
STRIP MISCELLANEOUS
Qty: 100 EACH | Refills: 3 | Status: SHIPPED | OUTPATIENT
Start: 2020-09-24 | End: 2021-09-30 | Stop reason: SDUPTHER

## 2020-09-24 RX ORDER — LANCETS 33 GAUGE
EACH MISCELLANEOUS
Qty: 200 EACH | Refills: 1 | Status: SHIPPED | OUTPATIENT
Start: 2020-09-24

## 2020-10-08 ENCOUNTER — HOSPITAL ENCOUNTER (OUTPATIENT)
Dept: GENERAL RADIOLOGY | Facility: HOSPITAL | Age: 75
Discharge: HOME | End: 2020-10-08

## 2020-10-08 ENCOUNTER — HOSPITAL ENCOUNTER (OUTPATIENT)
Dept: ULTRASOUND IMAGING | Facility: HOSPITAL | Age: 75
Discharge: HOME | End: 2020-10-08

## 2020-10-08 ENCOUNTER — APPOINTMENT (OUTPATIENT)
Dept: BONE DENSITY | Facility: HOSPITAL | Age: 75
End: 2020-10-08

## 2020-10-08 ENCOUNTER — HOSPITAL ENCOUNTER (OUTPATIENT)
Dept: MAMMOGRAPHY | Facility: HOSPITAL | Age: 75
Discharge: HOME | End: 2020-10-08

## 2020-10-08 DIAGNOSIS — Z12.31 ENCOUNTER FOR SCREENING MAMMOGRAM FOR MALIGNANT NEOPLASM OF BREAST: ICD-10-CM

## 2020-10-08 DIAGNOSIS — D17.20 LIPOMA OF UPPER ARM: ICD-10-CM

## 2020-10-08 DIAGNOSIS — M25.442 FINGER JOINT SWELLING, LEFT: ICD-10-CM

## 2020-10-08 DIAGNOSIS — Z78.0 POSTMENOPAUSAL: ICD-10-CM

## 2020-10-08 PROCEDURE — 76882 US LMTD JT/FCL EVL NVASC XTR: CPT

## 2020-10-08 PROCEDURE — 77063 BREAST TOMOSYNTHESIS BI: CPT

## 2020-10-08 PROCEDURE — 77067 SCR MAMMO BI INCL CAD: CPT

## 2020-10-08 PROCEDURE — 77080 DXA BONE DENSITY AXIAL: CPT

## 2020-10-08 PROCEDURE — 73130 X-RAY EXAM OF HAND: CPT

## 2020-10-08 NOTE — PROGRESS NOTES
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

## 2020-10-21 NOTE — PROGRESS NOTES
Dear MsSteven Kaushal,    Thank you for obtaining your DEXA (bone density) scan.  I have received those results and would like to review them with you.      Your bone density remains in the osteopenia range in your left hip.  This is between normal and osteoporosis.      Please maintain regular calcium and vitamin D supplementation.  Calcium intake should be 1000mg per day between diet and supplements.  Weight bearing exercise is good for bone health as well.    We should plan to repeat your DEXA in 2-3 years.  Please let me know if you have any questions or concerns regarding these results.        Sincerely,  Nuris Cristobal PA

## 2020-11-11 RX ORDER — SODIUM, POTASSIUM,MAG SULFATES 17.5-3.13G
2 SOLUTION, RECONSTITUTED, ORAL ORAL TAKE AS DIRECTED
Qty: 354 ML | Refills: 0 | Status: SHIPPED | OUTPATIENT
Start: 2020-11-11 | End: 2020-11-23

## 2020-11-23 ENCOUNTER — OFFICE VISIT (OUTPATIENT)
Dept: CARDIOLOGY | Facility: CLINIC | Age: 75
End: 2020-11-23

## 2020-11-23 VITALS
HEIGHT: 64 IN | TEMPERATURE: 97.8 F | HEART RATE: 67 BPM | SYSTOLIC BLOOD PRESSURE: 128 MMHG | OXYGEN SATURATION: 96 % | BODY MASS INDEX: 27.49 KG/M2 | DIASTOLIC BLOOD PRESSURE: 68 MMHG | WEIGHT: 161 LBS

## 2020-11-23 DIAGNOSIS — E78.5 HYPERLIPIDEMIA, UNSPECIFIED HYPERLIPIDEMIA TYPE: ICD-10-CM

## 2020-11-23 DIAGNOSIS — R07.9 CHEST PAIN, UNSPECIFIED TYPE: Primary | ICD-10-CM

## 2020-11-23 DIAGNOSIS — I10 ESSENTIAL HYPERTENSION: ICD-10-CM

## 2020-11-23 PROCEDURE — 99213 OFFICE O/P EST LOW 20 MIN: CPT | Performed by: INTERNAL MEDICINE

## 2020-11-23 NOTE — PROGRESS NOTES
"[facility]  Subjective:     Encounter Date: 2020      Patient ID: Mariaelena Easley is a 75 y.o. female.    Chief Complaint: Chest pain, unspecified type      PROBLEM LIST:  1. Chest pain  a. Myocardial perfusion study 18 EF of 66% very small sized, mildly severe area of ischemia in the lateral wall.  Low risk study.   b. Stress MPS, 20: Myocardial perfusion imaging indicates a small sized infarct located in the lateral wall with no significant ischemia noted. EF 57%.   c. US nonvascular extremity, 10/08/20: 2.5 cm lipoma in the area of the palpable abnormality.   2. Hypertension  3. Dyslipidemia  4. Borderline diabetes  5. Eczema  6. Surgeries:  a. Bilateral breast augmentation  b. Right breast biopsy  c.         History of Present Illness  Mariaelena Easley returns today for an annual follow up with a history of chest pain and cardiac risk factors. Since last visit, patient has been feeling well overall from a cardiovascular standpoint. She has been feeling tired whenever she goes up and down stairs but has recently noticed that she experiences \"sensation of holding her breath\". She says when she gets to the top of the stairs she feels like she was holding her breath while walking. However, patient denies chest pain, palpitations, edema, dizziness, and syncope. Patient has not had any ER visits, hospitalizations, surgeries, or new diagnoses since she was last seen. She was scheduled to have a colonoscopy last Wednesday but decided to cancel that due to the required COVID test difficulty.     Allergies   Allergen Reactions   • Codeine Nausea Only and Dizziness   • Sulfa Antibiotics Rash         Current Outpatient Medications:   •  amLODIPine (NORVASC) 5 MG tablet, TAKE 1 TABLET DAILY (MUST  KEEP APPOINTMENT FOR       FURTHER REFILLS), Disp: 90 tablet, Rfl: 0  •  Ascorbic Acid (NEVILLE-C PO), Take  by mouth Daily., Disp: , Rfl:   •  atorvastatin (LIPITOR) 80 " MG tablet, Take 1 tablet by mouth Daily., Disp: 90 tablet, Rfl: 3  •  Biotin w/ Vitamins C & E (HAIR SKIN & NAILS GUMMIES PO), Take 2 tablets by mouth Daily., Disp: , Rfl:   •  Calcium 500-100 MG-UNIT chewable tablet, Chew 1 tablet Daily., Disp: , Rfl:   •  Coenzyme Q10 30 MG/5ML liquid, Take  by mouth Daily., Disp: , Rfl:   •  glucose blood (OneTouch Verio) test strip, USE TO TEST 2-3 TIMES DAILY, Disp: 100 each, Rfl: 3  •  hydroCHLOROthiazide (HYDRODIURIL) 25 MG tablet, TAKE 1 TABLET DAILY, Disp: 90 tablet, Rfl: 0  •  Lancets (OneTouch Delica Plus [id number]) mis, USE AND DISCARD LANCET TO  CHECK BLOOD SUGAR TWO TIMESA DAY, Disp: 200 each, Rfl: 1  •  metFORMIN ER (GLUCOPHAGE-XR) 500 MG 24 hr tablet, TAKE 2 TABLETS EVERY MORNING, AND 2 TABLETS EVERY EVENING, Disp: 360 tablet, Rfl: 0  •  promethazine (PHENERGAN) 25 MG tablet, Take 1 tablet by mouth Every 6 (Six) Hours As Needed for Nausea or Vomiting., Disp: 20 tablet, Rfl: 0  •  rizatriptan MLT (MAXALT-MLT) 10 MG disintegrating tablet, Take 1 tablet by mouth 1 (One) Time As Needed for Migraine for up to 1 dose. May repeat in 2 hours if needed, Disp: 9 tablet, Rfl: 5  •  VITAMIN E PO, Take  by mouth Daily., Disp: , Rfl:   •  Vitamins A & D (VITAMIN A & D PO), Take 2 tablets by mouth Daily., Disp: , Rfl:     The following portions of the patient's history were reviewed and updated as appropriate: allergies, current medications, past family history, past medical history, past social history, past surgical history and problem list.    Review of Systems   Constitution: Negative.   Cardiovascular: Negative for chest pain, dyspnea on exertion, leg swelling, palpitations and syncope.   Respiratory: Negative.  Negative for shortness of breath.    Hematologic/Lymphatic: Negative for bleeding problem. Does not bruise/bleed easily.   Skin: Negative for rash.   Musculoskeletal: Negative for muscle weakness and myalgias.   Gastrointestinal: Negative for heartburn, nausea and  "vomiting.   Neurological: Negative for dizziness, light-headedness, loss of balance and numbness.          Objective:     Vitals:    11/23/20 1358   BP: 128/68   BP Location: Left arm   Patient Position: Sitting   Cuff Size: Adult   Pulse: 67   Temp: 97.8 °F (36.6 °C)   SpO2: 96%   Weight: 73 kg (161 lb)   Height: 162.6 cm (64\")         Vitals signs reviewed.   Constitutional:       Appearance: Well-developed and not in distress.   Neck:      Thyroid: No thyromegaly.      Vascular: No carotid bruit or JVD.   Pulmonary:      Breath sounds: Normal breath sounds.   Cardiovascular:      Regular rhythm.      No gallop. No S3 and S4 gallop.   Edema:     Peripheral edema absent.   Abdominal:      General: Bowel sounds are normal.      Palpations: Abdomen is soft. There is no abdominal mass.      Tenderness: There is no abdominal tenderness.   Musculoskeletal:         General: No deformity.      Extremities: No clubbing present.  Skin:     General: Skin is warm and dry.      Findings: No rash.   Neurological:      Mental Status: Alert and oriented to person, place, and time.         Lab Review:  Lab Results   Component Value Date    GLUCOSE 117 (H) 12/13/2019    BUN 13 12/13/2019    CREATININE 0.67 12/13/2019    EGFRIFNONA 87 12/13/2019    BCR 19.4 12/13/2019    K 4.2 12/13/2019    CO2 30.6 (H) 12/13/2019    CALCIUM 9.7 12/13/2019    ALBUMIN 4.30 12/13/2019    ALKPHOS 87 12/13/2019    AST 20 12/13/2019    ALT 31 12/13/2019     Lab Results   Component Value Date    CHOL 157 12/13/2019    TRIG 72 12/13/2019    HDL 60 12/13/2019    LDL 83 12/13/2019      Lab Results   Component Value Date    WBC 7.20 03/16/2018    RBC 4.61 03/16/2018    HGB 12.9 03/16/2018    HCT 40.2 03/16/2018    MCV 87.2 03/16/2018     03/16/2018     Lab Results   Component Value Date    TSH 1.452 11/29/2016     Lab Results   Component Value Date    HGBA1C 6.8 09/03/2020        Procedures        Assessment:   Diagnoses and all orders for this " visit:    1. Chest pain, unspecified type  (Primary)    2. Essential hypertension    3. Hyperlipidemia, unspecified hyperlipidemia type        Impression:  1. Coronary artery disease, exertional dyspnea but no angina. Mildly abnormal perfusion study in July is similar to 2019 study.   2. Hypertension, well controlled on HCTZ 25 mg.  3. Hyperlipidemia, monitored by PCP, on atorvastatin 80 mg.    Plan:  1. Stable cardiac status overall.   2. Continue current medications.  3. Revisit in 12 MO, or sooner as needed.    Scribed for Raymond Mcdaniel MD by Sung Leal 11/23/2020  14:35 EST    Raymond Mcdaniel MD      Please note that portions of this note may have been completed with a voice recognition program. Efforts were made to edit the dictations, but occasionally words are mistranscribed.

## 2020-12-04 ENCOUNTER — LAB (OUTPATIENT)
Dept: LAB | Facility: HOSPITAL | Age: 75
End: 2020-12-04

## 2020-12-04 ENCOUNTER — OFFICE VISIT (OUTPATIENT)
Dept: INTERNAL MEDICINE | Facility: CLINIC | Age: 75
End: 2020-12-04

## 2020-12-04 VITALS
BODY MASS INDEX: 27.4 KG/M2 | HEART RATE: 80 BPM | DIASTOLIC BLOOD PRESSURE: 76 MMHG | OXYGEN SATURATION: 96 % | SYSTOLIC BLOOD PRESSURE: 122 MMHG | WEIGHT: 159.6 LBS

## 2020-12-04 DIAGNOSIS — E07.9 THYROID DISEASE: ICD-10-CM

## 2020-12-04 DIAGNOSIS — E11.65 TYPE 2 DIABETES MELLITUS WITH HYPERGLYCEMIA, WITHOUT LONG-TERM CURRENT USE OF INSULIN (HCC): ICD-10-CM

## 2020-12-04 DIAGNOSIS — E11.65 TYPE 2 DIABETES MELLITUS WITH HYPERGLYCEMIA, WITHOUT LONG-TERM CURRENT USE OF INSULIN (HCC): Primary | ICD-10-CM

## 2020-12-04 DIAGNOSIS — I10 ESSENTIAL HYPERTENSION: ICD-10-CM

## 2020-12-04 DIAGNOSIS — Z23 NEED FOR INFLUENZA VACCINATION: ICD-10-CM

## 2020-12-04 DIAGNOSIS — E78.5 HYPERLIPIDEMIA, UNSPECIFIED HYPERLIPIDEMIA TYPE: ICD-10-CM

## 2020-12-04 DIAGNOSIS — D64.9 ANEMIA, UNSPECIFIED TYPE: ICD-10-CM

## 2020-12-04 DIAGNOSIS — E11.9 TYPE 2 DIABETES MELLITUS WITHOUT COMPLICATION, WITHOUT LONG-TERM CURRENT USE OF INSULIN (HCC): ICD-10-CM

## 2020-12-04 LAB
BASOPHILS # BLD AUTO: 0.04 10*3/MM3 (ref 0–0.2)
BASOPHILS NFR BLD AUTO: 0.4 % (ref 0–1.5)
DEPRECATED RDW RBC AUTO: 38.1 FL (ref 37–54)
EOSINOPHIL # BLD AUTO: 0.08 10*3/MM3 (ref 0–0.4)
EOSINOPHIL NFR BLD AUTO: 0.9 % (ref 0.3–6.2)
ERYTHROCYTE [DISTWIDTH] IN BLOOD BY AUTOMATED COUNT: 12.2 % (ref 12.3–15.4)
GLUCOSE BLDC GLUCOMTR-MCNC: 124 MG/DL (ref 70–130)
HBA1C MFR BLD: 6.7 %
HCT VFR BLD AUTO: 41.1 % (ref 34–46.6)
HGB BLD-MCNC: 13.6 G/DL (ref 12–15.9)
IMM GRANULOCYTES # BLD AUTO: 0.04 10*3/MM3 (ref 0–0.05)
IMM GRANULOCYTES NFR BLD AUTO: 0.4 % (ref 0–0.5)
LYMPHOCYTES # BLD AUTO: 2.27 10*3/MM3 (ref 0.7–3.1)
LYMPHOCYTES NFR BLD AUTO: 24.7 % (ref 19.6–45.3)
MCH RBC QN AUTO: 28.3 PG (ref 26.6–33)
MCHC RBC AUTO-ENTMCNC: 33.1 G/DL (ref 31.5–35.7)
MCV RBC AUTO: 85.6 FL (ref 79–97)
MONOCYTES # BLD AUTO: 0.63 10*3/MM3 (ref 0.1–0.9)
MONOCYTES NFR BLD AUTO: 6.9 % (ref 5–12)
NEUTROPHILS NFR BLD AUTO: 6.12 10*3/MM3 (ref 1.7–7)
NEUTROPHILS NFR BLD AUTO: 66.7 % (ref 42.7–76)
NRBC BLD AUTO-RTO: 0 /100 WBC (ref 0–0.2)
PLATELET # BLD AUTO: 351 10*3/MM3 (ref 140–450)
PMV BLD AUTO: 10.5 FL (ref 6–12)
RBC # BLD AUTO: 4.8 10*6/MM3 (ref 3.77–5.28)
WBC # BLD AUTO: 9.18 10*3/MM3 (ref 3.4–10.8)

## 2020-12-04 PROCEDURE — 83036 HEMOGLOBIN GLYCOSYLATED A1C: CPT | Performed by: PHYSICIAN ASSISTANT

## 2020-12-04 PROCEDURE — 80053 COMPREHEN METABOLIC PANEL: CPT

## 2020-12-04 PROCEDURE — 82962 GLUCOSE BLOOD TEST: CPT | Performed by: PHYSICIAN ASSISTANT

## 2020-12-04 PROCEDURE — 80061 LIPID PANEL: CPT

## 2020-12-04 PROCEDURE — 82570 ASSAY OF URINE CREATININE: CPT

## 2020-12-04 PROCEDURE — 99213 OFFICE O/P EST LOW 20 MIN: CPT | Performed by: PHYSICIAN ASSISTANT

## 2020-12-04 PROCEDURE — 82043 UR ALBUMIN QUANTITATIVE: CPT

## 2020-12-04 PROCEDURE — G0008 ADMIN INFLUENZA VIRUS VAC: HCPCS | Performed by: PHYSICIAN ASSISTANT

## 2020-12-04 PROCEDURE — 90694 VACC AIIV4 NO PRSRV 0.5ML IM: CPT | Performed by: PHYSICIAN ASSISTANT

## 2020-12-04 PROCEDURE — 84443 ASSAY THYROID STIM HORMONE: CPT

## 2020-12-04 PROCEDURE — 85025 COMPLETE CBC W/AUTO DIFF WBC: CPT

## 2020-12-04 NOTE — PROGRESS NOTES
Chief Complaint   Patient presents with   • Hyperlipidemia     Follow Up   • Hypertension   • Thyroid Eye Disease   • Diabetes     LAST A1C 6.8        Subjective     Mariaelena Easley is a 75 y.o. female.        History of Present Illness     Pt has been doing well, caring for her little dogs and cat helps her to manage stress. Has had some stress with her husbands healthShe has been taking medications as directed and trying to watch her diet.     Saw her dermatologist in the last month and had some lesions with atypical cells.     Has also had her diabetic exam in the last month, normal exam.      Current Outpatient Medications:   •  amLODIPine (NORVASC) 5 MG tablet, TAKE 1 TABLET DAILY (MUST  KEEP APPOINTMENT FOR       FURTHER REFILLS), Disp: 90 tablet, Rfl: 0  •  Ascorbic Acid (NEVILLE-C PO), Take  by mouth Daily., Disp: , Rfl:   •  atorvastatin (LIPITOR) 80 MG tablet, Take 1 tablet by mouth Daily., Disp: 90 tablet, Rfl: 3  •  Biotin w/ Vitamins C & E (HAIR SKIN & NAILS GUMMIES PO), Take 2 tablets by mouth Daily., Disp: , Rfl:   •  Calcium 500-100 MG-UNIT chewable tablet, Chew 1 tablet Daily., Disp: , Rfl:   •  Coenzyme Q10 30 MG/5ML liquid, Take  by mouth Daily., Disp: , Rfl:   •  glucose blood (OneTouch Verio) test strip, USE TO TEST 2-3 TIMES DAILY, Disp: 100 each, Rfl: 3  •  hydroCHLOROthiazide (HYDRODIURIL) 25 MG tablet, TAKE 1 TABLET DAILY, Disp: 90 tablet, Rfl: 0  •  Lancets (OneTouch Delica Plus [id number]) misc, USE AND DISCARD LANCET TO  CHECK BLOOD SUGAR TWO TIMESA DAY, Disp: 200 each, Rfl: 1  •  metFORMIN ER (GLUCOPHAGE-XR) 500 MG 24 hr tablet, TAKE 2 TABLETS EVERY MORNING, AND 2 TABLETS EVERY EVENING, Disp: 360 tablet, Rfl: 0  •  promethazine (PHENERGAN) 25 MG tablet, Take 1 tablet by mouth Every 6 (Six) Hours As Needed for Nausea or Vomiting., Disp: 20 tablet, Rfl: 0  •  rizatriptan MLT (MAXALT-MLT) 10 MG disintegrating tablet, Take 1 tablet by mouth 1 (One) Time As Needed for Migraine for up to 1 dose.  May repeat in 2 hours if needed, Disp: 9 tablet, Rfl: 5  •  VITAMIN E PO, Take  by mouth Daily., Disp: , Rfl:   •  Vitamins A & D (VITAMIN A & D PO), Take 2 tablets by mouth Daily., Disp: , Rfl:      PMFSH  The following portions of the patient's history were reviewed and updated as appropriate: allergies, current medications, past family history, past medical history, past social history, past surgical history and problem list.    Review of Systems   Constitutional: Negative for activity change, appetite change and fatigue.   HENT: Negative for congestion and rhinorrhea.    Respiratory: Negative for chest tightness and shortness of breath.    Cardiovascular: Negative for chest pain and palpitations.   Gastrointestinal: Negative for abdominal pain.   Genitourinary: Negative for dysuria.   Musculoskeletal: Negative for arthralgias and myalgias.   Neurological: Negative for dizziness, weakness, light-headedness and headaches.   Psychiatric/Behavioral: Negative for dysphoric mood. The patient is not nervous/anxious.        Objective   /76   Pulse 80   Wt 72.4 kg (159 lb 9.6 oz)   LMP  (LMP Unknown)   SpO2 96%   BMI 27.40 kg/m²     Physical Exam  Vitals signs and nursing note reviewed.   Constitutional:       Appearance: She is well-developed.   HENT:      Head: Normocephalic and atraumatic.      Right Ear: External ear normal.      Left Ear: External ear normal.   Eyes:      Conjunctiva/sclera: Conjunctivae normal.   Neck:      Musculoskeletal: Normal range of motion.   Cardiovascular:      Rate and Rhythm: Normal rate and regular rhythm.   Pulmonary:      Effort: Pulmonary effort is normal.      Breath sounds: Normal breath sounds.   Musculoskeletal: Normal range of motion.   Skin:     General: Skin is warm and dry.   Psychiatric:         Behavior: Behavior normal.         Results for orders placed or performed in visit on 12/04/20   POC Glucose    Specimen: Blood   Result Value Ref Range    Glucose 124 70  - 130 mg/dL   POC Glycosylated Hemoglobin (Hb A1C)    Specimen: Blood   Result Value Ref Range    Hemoglobin A1C 6.7 %        ASSESSMENT/PLAN    Diagnoses and all orders for this visit:    1. Type 2 diabetes mellitus with hyperglycemia, without long-term current use of insulin (CMS/[facility], located [facility]) (Primary)  Assessment & Plan:  Diabetes is improving with treatment.   Continue current treatment regimen.  Reminded to bring in blood sugar diary at next visit.  Dietary recommendations for ADA diet.  Regular aerobic exercise.  Diabetes will be reassessed in 3 months.    Orders:  -     POC Glucose  -     POC Glycosylated Hemoglobin (Hb A1C)  -     Microalbumin / Creatinine Urine Ratio - Urine, Clean Catch; Future    2. Essential hypertension  Assessment & Plan:  Hypertension is unchanged.  Continue current treatment regimen.  Dietary sodium restriction.  Weight loss.  Regular aerobic exercise.  Continue current medications.  Blood pressure will be reassessed in 3 months.      3. Need for influenza vaccination  -     Fluad Quad >65 years           Return in about 3 months (around 3/4/2021) for Follow up.

## 2020-12-05 LAB
ALBUMIN SERPL-MCNC: 4.9 G/DL (ref 3.5–5.2)
ALBUMIN UR-MCNC: 1.2 MG/DL
ALBUMIN/GLOB SERPL: 1.6 G/DL
ALP SERPL-CCNC: 86 U/L (ref 39–117)
ALT SERPL W P-5'-P-CCNC: 18 U/L (ref 1–33)
ANION GAP SERPL CALCULATED.3IONS-SCNC: 11.7 MMOL/L (ref 5–15)
AST SERPL-CCNC: 21 U/L (ref 1–32)
BILIRUB SERPL-MCNC: 0.6 MG/DL (ref 0–1.2)
BUN SERPL-MCNC: 15 MG/DL (ref 8–23)
BUN/CREAT SERPL: 21.1 (ref 7–25)
CALCIUM SPEC-SCNC: 10.1 MG/DL (ref 8.6–10.5)
CHLORIDE SERPL-SCNC: 94 MMOL/L (ref 98–107)
CHOLEST SERPL-MCNC: 174 MG/DL (ref 0–200)
CO2 SERPL-SCNC: 30.3 MMOL/L (ref 22–29)
CREAT SERPL-MCNC: 0.71 MG/DL (ref 0.57–1)
CREAT UR-MCNC: 127 MG/DL
GFR SERPL CREATININE-BSD FRML MDRD: 81 ML/MIN/1.73
GLOBULIN UR ELPH-MCNC: 3 GM/DL
GLUCOSE SERPL-MCNC: 105 MG/DL (ref 65–99)
HDLC SERPL-MCNC: 61 MG/DL (ref 40–60)
LDLC SERPL CALC-MCNC: 98 MG/DL (ref 0–100)
LDLC/HDLC SERPL: 1.58 {RATIO}
MICROALBUMIN/CREAT UR: 9.4 MG/G
POTASSIUM SERPL-SCNC: 3.9 MMOL/L (ref 3.5–5.2)
PROT SERPL-MCNC: 7.9 G/DL (ref 6–8.5)
SODIUM SERPL-SCNC: 136 MMOL/L (ref 136–145)
TRIGL SERPL-MCNC: 82 MG/DL (ref 0–150)
TSH SERPL DL<=0.05 MIU/L-ACNC: 1.06 UIU/ML (ref 0.27–4.2)
VLDLC SERPL-MCNC: 15 MG/DL (ref 5–40)

## 2021-03-26 DIAGNOSIS — I10 ESSENTIAL HYPERTENSION: ICD-10-CM

## 2021-03-26 DIAGNOSIS — E11.9 TYPE 2 DIABETES MELLITUS WITHOUT COMPLICATION, WITHOUT LONG-TERM CURRENT USE OF INSULIN (HCC): ICD-10-CM

## 2021-03-27 RX ORDER — METFORMIN HYDROCHLORIDE 500 MG/1
TABLET, EXTENDED RELEASE ORAL
Qty: 360 TABLET | Refills: 2 | Status: SHIPPED | OUTPATIENT
Start: 2021-03-27 | End: 2021-12-20

## 2021-03-27 RX ORDER — HYDROCHLOROTHIAZIDE 25 MG/1
TABLET ORAL
Qty: 90 TABLET | Refills: 2 | Status: SHIPPED | OUTPATIENT
Start: 2021-03-27 | End: 2021-12-20

## 2021-03-27 RX ORDER — AMLODIPINE BESYLATE 5 MG/1
5 TABLET ORAL DAILY
Qty: 90 TABLET | Refills: 2 | Status: SHIPPED | OUTPATIENT
Start: 2021-03-27 | End: 2021-12-20

## 2021-04-08 ENCOUNTER — OFFICE VISIT (OUTPATIENT)
Dept: INTERNAL MEDICINE | Facility: CLINIC | Age: 76
End: 2021-04-08

## 2021-04-08 VITALS
TEMPERATURE: 97.8 F | DIASTOLIC BLOOD PRESSURE: 88 MMHG | WEIGHT: 162.8 LBS | OXYGEN SATURATION: 91 % | BODY MASS INDEX: 27.94 KG/M2 | SYSTOLIC BLOOD PRESSURE: 130 MMHG | HEART RATE: 94 BPM

## 2021-04-08 DIAGNOSIS — E11.65 TYPE 2 DIABETES MELLITUS WITH HYPERGLYCEMIA, WITHOUT LONG-TERM CURRENT USE OF INSULIN (HCC): Primary | ICD-10-CM

## 2021-04-08 DIAGNOSIS — I10 ESSENTIAL HYPERTENSION: ICD-10-CM

## 2021-04-08 LAB
GLUCOSE BLDC GLUCOMTR-MCNC: 117 MG/DL (ref 70–130)
HBA1C MFR BLD: 6.7 %

## 2021-04-08 PROCEDURE — 82947 ASSAY GLUCOSE BLOOD QUANT: CPT | Performed by: PHYSICIAN ASSISTANT

## 2021-04-08 PROCEDURE — 99213 OFFICE O/P EST LOW 20 MIN: CPT | Performed by: PHYSICIAN ASSISTANT

## 2021-04-08 PROCEDURE — 83036 HEMOGLOBIN GLYCOSYLATED A1C: CPT | Performed by: PHYSICIAN ASSISTANT

## 2021-04-08 NOTE — ASSESSMENT & PLAN NOTE
Diabetes is unchanged.   Reminded to bring in blood sugar diary at next visit.  Dietary recommendations for ADA diet.  Regular aerobic exercise.  Medication changes per orders. Trial of reducing metformin to 500 mg BID to see if intermittent diarrhea improves. Discussed adding jardiance or glp-1 inhibitor if a1c increases.  Diabetes will be reassessed in 3 months.

## 2021-04-08 NOTE — PROGRESS NOTES
Chief Complaint   Patient presents with   • Hypertension     Follow Up   • Diabetes     LAST A1C 6.7       Subjective     Mariaelena Easley is a 75 y.o. female.        History of Present Illness     Pt is doing fine. She is taking her medications as directed. She notes sometimes that she wakes up feeling bad but when she checks her blood sugar, it is between 100-125. Feels much better when the weather is better.     She notes that she gets distracted easily. Used to be able to do several things at once and now has to focus on once thing at a time.     She notes that she sleeps about 2-5 hours at night before she wakes up to go to the bathroom. Also has trouble going to sleep at night. Has tried melatonin but it did not help her.     Still has intermittent diarrhea. Happens at least once a week, her  also struggles with this.     She still has some occasional unsteadiness in the mornings. Takes her time to get going in the morning so she does not get off balance.     Had her 2 covid vaccines.    She is due for her ovarian cancer screening in May.        Current Outpatient Medications:   •  amLODIPine (NORVASC) 5 MG tablet, Take 1 tablet by mouth Daily., Disp: 90 tablet, Rfl: 2  •  Ascorbic Acid (NEVILLE-C PO), Take  by mouth Daily., Disp: , Rfl:   •  atorvastatin (LIPITOR) 80 MG tablet, Take 1 tablet by mouth Daily., Disp: 90 tablet, Rfl: 3  •  Biotin w/ Vitamins C & E (HAIR SKIN & NAILS GUMMIES PO), Take 2 tablets by mouth Daily., Disp: , Rfl:   •  Calcium 500-100 MG-UNIT chewable tablet, Chew 1 tablet Daily., Disp: , Rfl:   •  Coenzyme Q10 30 MG/5ML liquid, Take  by mouth Daily., Disp: , Rfl:   •  glucose blood (OneTouch Verio) test strip, USE TO TEST 2-3 TIMES DAILY, Disp: 100 each, Rfl: 3  •  hydroCHLOROthiazide (HYDRODIURIL) 25 MG tablet, TAKE 1 TABLET DAILY, Disp: 90 tablet, Rfl: 2  •  Lancets (OneTouch Delica Plus [id number]) misc, USE AND DISCARD LANCET TO  CHECK BLOOD SUGAR TWO TIMESA DAY, Disp: 200 each,  Rfl: 1  •  metFORMIN ER (GLUCOPHAGE-XR) 500 MG 24 hr tablet, TAKE 2 TABLETS EVERY       MORNING AND 2 TABLETS EVERYEVENING, Disp: 360 tablet, Rfl: 2  •  promethazine (PHENERGAN) 25 MG tablet, Take 1 tablet by mouth Every 6 (Six) Hours As Needed for Nausea or Vomiting., Disp: 20 tablet, Rfl: 0  •  rizatriptan MLT (MAXALT-MLT) 10 MG disintegrating tablet, Take 1 tablet by mouth 1 (One) Time As Needed for Migraine for up to 1 dose. May repeat in 2 hours if needed, Disp: 9 tablet, Rfl: 5  •  VITAMIN E PO, Take  by mouth Daily., Disp: , Rfl:   •  Vitamins A & D (VITAMIN A & D PO), Take 2 tablets by mouth Daily., Disp: , Rfl:      PMFSH  The following portions of the patient's history were reviewed and updated as appropriate: allergies, current medications, past family history, past medical history, past social history, past surgical history and problem list.    Review of Systems   Constitutional: Negative for activity change, appetite change and fatigue.   HENT: Negative for congestion and rhinorrhea.    Respiratory: Negative for chest tightness and shortness of breath.    Cardiovascular: Negative for chest pain and palpitations.   Gastrointestinal: Positive for diarrhea. Negative for abdominal pain.   Genitourinary: Negative for dysuria.   Musculoskeletal: Negative for arthralgias and myalgias.   Neurological: Negative for dizziness, weakness, light-headedness and headaches.   Psychiatric/Behavioral: Negative for dysphoric mood. The patient is not nervous/anxious.        Objective   /88   Pulse 94   Temp 97.8 °F (36.6 °C)   Wt 73.8 kg (162 lb 12.8 oz)   LMP  (LMP Unknown)   SpO2 91%   BMI 27.94 kg/m²     Physical Exam  Vitals and nursing note reviewed.   Constitutional:       Appearance: She is well-developed.   HENT:      Head: Normocephalic.      Right Ear: Hearing, tympanic membrane, ear canal and external ear normal.      Left Ear: Hearing, tympanic membrane, ear canal and external ear normal.      Nose:  Nose normal.   Eyes:      Conjunctiva/sclera: Conjunctivae normal.      Pupils: Pupils are equal, round, and reactive to light.   Cardiovascular:      Rate and Rhythm: Normal rate and regular rhythm.      Heart sounds: Normal heart sounds.   Pulmonary:      Effort: Pulmonary effort is normal.      Breath sounds: Normal breath sounds. No decreased breath sounds, wheezing, rhonchi or rales.   Musculoskeletal:         General: Normal range of motion.      Cervical back: Normal range of motion.   Feet:      Right foot:      Skin integrity: Skin integrity normal.      Left foot:      Skin integrity: Skin integrity normal.   Skin:     General: Skin is warm and dry.   Neurological:      Mental Status: She is alert.   Psychiatric:         Behavior: Behavior normal.         Results for orders placed or performed in visit on 04/08/21   POC Glucose    Specimen: Blood   Result Value Ref Range    Glucose 117 70 - 130 mg/dL   POC Glycosylated Hemoglobin (Hb A1C)    Specimen: Blood   Result Value Ref Range    Hemoglobin A1C 6.7 %        ASSESSMENT/PLAN    Diagnoses and all orders for this visit:    1. Type 2 diabetes mellitus with hyperglycemia, without long-term current use of insulin (CMS/[facility]) (Primary)  Assessment & Plan:  Diabetes is unchanged.   Reminded to bring in blood sugar diary at next visit.  Dietary recommendations for ADA diet.  Regular aerobic exercise.  Medication changes per orders. Trial of reducing metformin to 500 mg BID to see if intermittent diarrhea improves. Discussed adding jardiance or glp-1 inhibitor if a1c increases.  Diabetes will be reassessed in 3 months.    Orders:  -     POC Glucose  -     POC Glycosylated Hemoglobin (Hb A1C)    2. Essential hypertension  Assessment & Plan:  Hypertension is unchanged.  Continue current treatment regimen.  Dietary sodium restriction.  Weight loss.  Regular aerobic exercise.  Continue current medications.  Blood pressure will be reassessed at the next regular  appointment.             Return in about 3 months (around 7/8/2021) for Follow up.  Answers for HPI/ROS submitted by the patient on 4/8/2021  Please describe your symptoms.: No symptoms. Follow up.  Have you had these symptoms before?: No  How long have you been having these symptoms?: Greater than 2 weeks  Please list any medications you are currently taking for this condition.: None  What is the primary reason for your visit?: Other

## 2021-06-08 RX ORDER — SODIUM, POTASSIUM,MAG SULFATES 17.5-3.13G
SOLUTION, RECONSTITUTED, ORAL ORAL
Qty: 1 ML | Refills: 0 | Status: SHIPPED | OUTPATIENT
Start: 2021-06-08 | End: 2021-06-14

## 2021-06-14 ENCOUNTER — HOSPITAL ENCOUNTER (OUTPATIENT)
Dept: GENERAL RADIOLOGY | Facility: HOSPITAL | Age: 76
Discharge: HOME | End: 2021-06-14
Admitting: PHYSICIAN ASSISTANT

## 2021-06-14 ENCOUNTER — OFFICE VISIT (OUTPATIENT)
Dept: INTERNAL MEDICINE | Facility: CLINIC | Age: 76
End: 2021-06-14

## 2021-06-14 VITALS
OXYGEN SATURATION: 90 % | SYSTOLIC BLOOD PRESSURE: 132 MMHG | WEIGHT: 162.2 LBS | HEART RATE: 101 BPM | DIASTOLIC BLOOD PRESSURE: 70 MMHG | BODY MASS INDEX: 27.84 KG/M2

## 2021-06-14 DIAGNOSIS — R10.31 RIGHT GROIN PAIN: ICD-10-CM

## 2021-06-14 DIAGNOSIS — R10.31 RIGHT GROIN PAIN: Primary | ICD-10-CM

## 2021-06-14 PROCEDURE — 99213 OFFICE O/P EST LOW 20 MIN: CPT | Performed by: PHYSICIAN ASSISTANT

## 2021-06-14 PROCEDURE — 73502 X-RAY EXAM HIP UNI 2-3 VIEWS: CPT

## 2021-06-14 NOTE — PROGRESS NOTES
Chief Complaint   Patient presents with   • Right Leg Pain     Acute        Subjective     Mariaelena Easley is a 76 y.o. female.        History of Present Illness     A few days ago noticed some pain in her right groin. Feels like a pulled muscle. Thinks she could have strained it when getting into her tall bed. No pain when she is just sitting. Can feel it when she bends over to pick something up. Hurts to climb up into her bed.    She had spot removed on her upper arm recently. She is waiting on biopsy results.        Current Outpatient Medications:   •  amLODIPine (NORVASC) 5 MG tablet, Take 1 tablet by mouth Daily., Disp: 90 tablet, Rfl: 2  •  Ascorbic Acid (NEVILLE-C PO), Take  by mouth Daily., Disp: , Rfl:   •  atorvastatin (LIPITOR) 80 MG tablet, Take 1 tablet by mouth Daily., Disp: 90 tablet, Rfl: 3  •  Biotin w/ Vitamins C & E (HAIR SKIN & NAILS GUMMIES PO), Take 2 tablets by mouth Daily., Disp: , Rfl:   •  Calcium 500-100 MG-UNIT chewable tablet, Chew 1 tablet Daily., Disp: , Rfl:   •  Coenzyme Q10 30 MG/5ML liquid, Take  by mouth Daily., Disp: , Rfl:   •  glucose blood (OneTouch Verio) test strip, USE TO TEST 2-3 TIMES DAILY, Disp: 100 each, Rfl: 3  •  hydroCHLOROthiazide (HYDRODIURIL) 25 MG tablet, TAKE 1 TABLET DAILY, Disp: 90 tablet, Rfl: 2  •  Lancets (OneTouch Delica Plus [id number]) misc, USE AND DISCARD LANCET TO  CHECK BLOOD SUGAR TWO TIMESA DAY, Disp: 200 each, Rfl: 1  •  metFORMIN ER (GLUCOPHAGE-XR) 500 MG 24 hr tablet, TAKE 2 TABLETS EVERY       MORNING AND 2 TABLETS EVERYEVENING, Disp: 360 tablet, Rfl: 2  •  promethazine (PHENERGAN) 25 MG tablet, Take 1 tablet by mouth Every 6 (Six) Hours As Needed for Nausea or Vomiting., Disp: 20 tablet, Rfl: 0  •  rizatriptan MLT (MAXALT-MLT) 10 MG disintegrating tablet, Take 1 tablet by mouth 1 (One) Time As Needed for Migraine for up to 1 dose. May repeat in 2 hours if needed, Disp: 9 tablet, Rfl: 5  •  VITAMIN E PO, Take  by mouth Daily., Disp: , Rfl:   •   Vitamins A & D (VITAMIN A & D PO), Take 2 tablets by mouth Daily., Disp: , Rfl:      PMFSH  The following portions of the patient's history were reviewed and updated as appropriate: allergies, current medications, past family history, past medical history, past social history, past surgical history and problem list.    Review of Systems   Constitutional: Negative for fever and unexpected weight change.   HENT: Negative.    Eyes: Negative.    Respiratory: Negative for chest tightness, shortness of breath and wheezing.    Cardiovascular: Negative.    Gastrointestinal: Negative for abdominal pain.   Endocrine: Negative.    Genitourinary: Negative.    Musculoskeletal: Positive for arthralgias and myalgias.   Skin: Negative for color change, rash and wound.   Allergic/Immunologic: Negative.    Neurological: Negative for seizures, syncope and numbness.   Hematological: Negative for adenopathy. Does not bruise/bleed easily.   Psychiatric/Behavioral: Negative for confusion.       Objective   /70   Pulse 101   Wt 73.6 kg (162 lb 3.2 oz)   LMP  (LMP Unknown)   SpO2 90%   BMI 27.84 kg/m²     Physical Exam  Vitals and nursing note reviewed.   Constitutional:       Appearance: She is well-developed.   HENT:      Head: Normocephalic and atraumatic.      Right Ear: External ear normal.      Left Ear: External ear normal.   Eyes:      Conjunctiva/sclera: Conjunctivae normal.   Cardiovascular:      Rate and Rhythm: Normal rate and regular rhythm.   Pulmonary:      Effort: Pulmonary effort is normal.      Breath sounds: Normal breath sounds.   Musculoskeletal:         General: Normal range of motion.      Cervical back: Normal range of motion.      Right hip: Tenderness (anterior hip) present. No bony tenderness. Normal range of motion.   Skin:     General: Skin is warm and dry.   Psychiatric:         Behavior: Behavior normal.         ASSESSMENT/PLAN    Diagnoses and all orders for this visit:    1. Right groin pain  (Primary)  Comments:  Check xray of hip. Continue to rest, do gentle ROM exercises and avoid exacerbation with repetitive activities.  Orders:  -     XR Hip With or Without Pelvis 2 - 3 View Right; Future             Return if symptoms worsen or fail to improve, for Next scheduled follow up.

## 2021-06-16 ENCOUNTER — OUTSIDE FACILITY SERVICE (OUTPATIENT)
Dept: GASTROENTEROLOGY | Facility: CLINIC | Age: 76
End: 2021-06-16

## 2021-06-16 PROCEDURE — G0105 COLORECTAL SCRN; HI RISK IND: HCPCS | Performed by: INTERNAL MEDICINE

## 2021-06-17 NOTE — PROGRESS NOTES
The xray of your hip shows moderate arthritis that is worse on the right than left. Let me know if you continue to have pain. Instead of PT, I will do a referral to an Orthopedist to take a look at your imaging and make recommendations.

## 2021-06-21 DIAGNOSIS — E78.5 HYPERLIPIDEMIA, UNSPECIFIED HYPERLIPIDEMIA TYPE: ICD-10-CM

## 2021-06-21 RX ORDER — ATORVASTATIN CALCIUM 80 MG/1
80 TABLET, FILM COATED ORAL DAILY
Qty: 90 TABLET | Refills: 3 | Status: SHIPPED | OUTPATIENT
Start: 2021-06-21 | End: 2022-07-01

## 2021-09-30 RX ORDER — BLOOD SUGAR DIAGNOSTIC
STRIP MISCELLANEOUS
Qty: 300 EACH | Refills: 3 | Status: SHIPPED | OUTPATIENT
Start: 2021-09-30 | End: 2022-10-10

## 2021-11-29 ENCOUNTER — OFFICE VISIT (OUTPATIENT)
Dept: CARDIOLOGY | Facility: CLINIC | Age: 76
End: 2021-11-29

## 2021-11-29 VITALS
DIASTOLIC BLOOD PRESSURE: 62 MMHG | SYSTOLIC BLOOD PRESSURE: 122 MMHG | HEIGHT: 64 IN | BODY MASS INDEX: 27.39 KG/M2 | HEART RATE: 97 BPM | WEIGHT: 160.4 LBS | OXYGEN SATURATION: 94 %

## 2021-11-29 DIAGNOSIS — R07.9 CHEST PAIN, UNSPECIFIED TYPE: Primary | ICD-10-CM

## 2021-11-29 DIAGNOSIS — I10 ESSENTIAL HYPERTENSION: ICD-10-CM

## 2021-11-29 DIAGNOSIS — E78.5 HYPERLIPIDEMIA, UNSPECIFIED HYPERLIPIDEMIA TYPE: ICD-10-CM

## 2021-11-29 PROCEDURE — 99214 OFFICE O/P EST MOD 30 MIN: CPT | Performed by: NURSE PRACTITIONER

## 2021-11-29 NOTE — PROGRESS NOTES
Subjective:     Encounter Date:2021    Primary Care Physician: Nuris Cristobal PA      Patient ID: Mariaelena Easley is a 76 y.o. female.    Chief Complaint:Hyperlipidemia, Essential Hypertension, and Chest Pain    PROBLEM LIST:  1. Chest pain  a. Myocardial perfusion study 18 EF of 66% very small sized, mildly severe area of ischemia in the lateral wall.  Low risk study.   b. Stress MPS, 20: Myocardial perfusion imaging indicates a small sized infarct located in the lateral wall with no significant ischemia noted. EF 57%.   c. US nonvascular extremity, 10/08/20: 2.5 cm lipoma in the area of the palpable abnormality.   2. Hypertension  3. Dyslipidemia  4. Borderline diabetes  5. Eczema  6. Surgeries:  a. Bilateral breast augmentation  b. Right breast biopsy  c.         Allergies   Allergen Reactions   • Codeine Nausea Only and Dizziness   • Sulfa Antibiotics Rash         Current Outpatient Medications:   •  amLODIPine (NORVASC) 5 MG tablet, Take 1 tablet by mouth Daily., Disp: 90 tablet, Rfl: 2  •  Ascorbic Acid (NEVILLE-C PO), Take  by mouth Daily., Disp: , Rfl:   •  atorvastatin (LIPITOR) 80 MG tablet, Take 1 tablet by mouth Daily., Disp: 90 tablet, Rfl: 3  •  Biotin w/ Vitamins C & E (HAIR SKIN & NAILS GUMMIES PO), Take 2 tablets by mouth Daily., Disp: , Rfl:   •  Calcium 500-100 MG-UNIT chewable tablet, Chew 1 tablet Daily., Disp: , Rfl:   •  Coenzyme Q10 30 MG/5ML liquid, Take  by mouth Daily., Disp: , Rfl:   •  glucose blood (OneTouch Verio) test strip, USE TO TEST 2-3 TIMES DAILY, Disp: 300 each, Rfl: 3  •  hydroCHLOROthiazide (HYDRODIURIL) 25 MG tablet, TAKE 1 TABLET DAILY, Disp: 90 tablet, Rfl: 2  •  Lancets (OneTouch Delica Plus [id number]) mis, USE AND DISCARD LANCET TO  CHECK BLOOD SUGAR TWO TIMESA DAY, Disp: 200 each, Rfl: 1  •  metFORMIN ER (GLUCOPHAGE-XR) 500 MG 24 hr tablet, TAKE 2 TABLETS EVERY       MORNING AND 2 TABLETS EVERYEVENING, Disp: 360 tablet, Rfl: 2  •  promethazine  (PHENERGAN) 25 MG tablet, Take 1 tablet by mouth Every 6 (Six) Hours As Needed for Nausea or Vomiting., Disp: 20 tablet, Rfl: 0  •  rizatriptan MLT (MAXALT-MLT) 10 MG disintegrating tablet, Take 1 tablet by mouth 1 (One) Time As Needed for Migraine for up to 1 dose. May repeat in 2 hours if needed, Disp: 9 tablet, Rfl: 5  •  VITAMIN E PO, Take  by mouth Daily., Disp: , Rfl:   •  Vitamins A & D (VITAMIN A & D PO), Take 2 tablets by mouth Daily., Disp: , Rfl:         History of Present Illness    Patient is a 76-year-old  female who is being seen today for annual follow-up for current chest pain and low risk myocardial perfusion studies with risk factors.  Since last being seen she notes to overall be doing relatively well from cardiac standpoint.  Has some intermittent chest pain but nothing that is new or more frequent.  She denies any increase shortness of breath.  No syncope, near-syncope, or worsened edema.  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  Denies any overt angina.  Notes she is compliant with her medications.  Is due for an upcoming physical with her primary care in the next couple of weeks.    The following portions of the patient's history were reviewed and updated as appropriate: allergies, current medications, past family history, past medical history, past social history, past surgical history and problem list.      Social History     Tobacco Use   • Smoking status: Former Smoker   • Smokeless tobacco: Never Used   Vaping Use   • Vaping Use: Never used   Substance Use Topics   • Alcohol use: Yes   • Drug use: No         Review of Systems   Constitutional: Positive for malaise/fatigue.   Cardiovascular: Negative for chest pain, dyspnea on exertion, leg swelling, palpitations and syncope.   Respiratory: Negative.  Negative for shortness of breath.    Hematologic/Lymphatic: Negative for bleeding problem. Does not bruise/bleed  "easily.   Skin: Negative for rash.   Musculoskeletal: Positive for arthritis and joint pain. Negative for muscle weakness and myalgias.   Gastrointestinal: Negative for heartburn, nausea and vomiting.   Neurological: Negative for dizziness, light-headedness, loss of balance and numbness.          Objective:   /62 (BP Location: Left arm, Patient Position: Sitting, Cuff Size: Adult)   Pulse 97   Ht 162.6 cm (64\")   Wt 72.8 kg (160 lb 6.4 oz)   LMP  (LMP Unknown)   SpO2 94%   BMI 27.53 kg/m²         Vitals reviewed.   Constitutional:       Appearance: Healthy appearance. Well-developed and not in distress.   Neck:      Vascular: No JVD.      Trachea: No tracheal deviation.   Pulmonary:      Effort: Pulmonary effort is normal.      Breath sounds: Normal breath sounds.   Cardiovascular:      Normal rate. Regular rhythm.   Pulses:     Intact distal pulses.   Edema:     Peripheral edema absent.   Abdominal:      General: Bowel sounds are normal.      Palpations: Abdomen is soft.      Tenderness: There is no abdominal tenderness.   Musculoskeletal:         General: No deformity. Skin:     General: Skin is warm and dry.   Neurological:      Mental Status: Alert and oriented to person, place, and time.         Procedures          Assessment:   Assessment/Plan      Diagnoses and all orders for this visit:    1. Chest pain, unspecified type  (Primary), stable.  History of low risk ischemic studies in the past.  On amlodipine.    2. Essential hypertension, controlled.  On HCTZ.    3. Hyperlipidemia, unspecified hyperlipidemia type.  Stable.  On statin.  Due for upcoming labs with primary care.      Plan:  1. Continue current cardiac medications  2. Follow-up in 1 year's time or sooner if needed.       Stella MILLER     Dictated utilizing Dragon dictation  "

## 2021-12-06 DIAGNOSIS — E11.9 TYPE 2 DIABETES MELLITUS WITHOUT COMPLICATION, WITHOUT LONG-TERM CURRENT USE OF INSULIN (HCC): ICD-10-CM

## 2021-12-06 DIAGNOSIS — I10 ESSENTIAL HYPERTENSION: ICD-10-CM

## 2021-12-06 RX ORDER — METFORMIN HYDROCHLORIDE 500 MG/1
TABLET, EXTENDED RELEASE ORAL
Qty: 360 TABLET | Refills: 2 | OUTPATIENT
Start: 2021-12-06

## 2021-12-06 RX ORDER — HYDROCHLOROTHIAZIDE 25 MG/1
TABLET ORAL
Qty: 90 TABLET | Refills: 2 | OUTPATIENT
Start: 2021-12-06

## 2021-12-06 RX ORDER — AMLODIPINE BESYLATE 5 MG/1
TABLET ORAL
Qty: 90 TABLET | Refills: 2 | OUTPATIENT
Start: 2021-12-06

## 2021-12-15 ENCOUNTER — OFFICE VISIT (OUTPATIENT)
Dept: INTERNAL MEDICINE | Facility: CLINIC | Age: 76
End: 2021-12-15

## 2021-12-15 ENCOUNTER — LAB (OUTPATIENT)
Dept: LAB | Facility: HOSPITAL | Age: 76
End: 2021-12-15

## 2021-12-15 VITALS
WEIGHT: 157.6 LBS | HEART RATE: 82 BPM | DIASTOLIC BLOOD PRESSURE: 74 MMHG | BODY MASS INDEX: 26.91 KG/M2 | TEMPERATURE: 98.8 F | HEIGHT: 64 IN | OXYGEN SATURATION: 97 % | SYSTOLIC BLOOD PRESSURE: 126 MMHG

## 2021-12-15 DIAGNOSIS — E11.9 TYPE 2 DIABETES MELLITUS WITHOUT COMPLICATION, WITHOUT LONG-TERM CURRENT USE OF INSULIN (HCC): ICD-10-CM

## 2021-12-15 DIAGNOSIS — E07.9 THYROID DISEASE: ICD-10-CM

## 2021-12-15 DIAGNOSIS — Z23 NEED FOR INFLUENZA VACCINATION: ICD-10-CM

## 2021-12-15 DIAGNOSIS — I10 ESSENTIAL HYPERTENSION: ICD-10-CM

## 2021-12-15 DIAGNOSIS — Z00.00 ENCOUNTER FOR MEDICARE ANNUAL WELLNESS EXAM: Primary | ICD-10-CM

## 2021-12-15 LAB
EXPIRATION DATE: NORMAL
GLUCOSE BLDC GLUCOMTR-MCNC: 170 MG/DL (ref 70–130)
HBA1C MFR BLD: 6.7 %
Lab: NORMAL

## 2021-12-15 PROCEDURE — G0008 ADMIN INFLUENZA VIRUS VAC: HCPCS | Performed by: PHYSICIAN ASSISTANT

## 2021-12-15 PROCEDURE — 85025 COMPLETE CBC W/AUTO DIFF WBC: CPT

## 2021-12-15 PROCEDURE — 80053 COMPREHEN METABOLIC PANEL: CPT

## 2021-12-15 PROCEDURE — 82570 ASSAY OF URINE CREATININE: CPT

## 2021-12-15 PROCEDURE — 3044F HG A1C LEVEL LT 7.0%: CPT | Performed by: PHYSICIAN ASSISTANT

## 2021-12-15 PROCEDURE — 82043 UR ALBUMIN QUANTITATIVE: CPT

## 2021-12-15 PROCEDURE — 84443 ASSAY THYROID STIM HORMONE: CPT

## 2021-12-15 PROCEDURE — 82962 GLUCOSE BLOOD TEST: CPT | Performed by: PHYSICIAN ASSISTANT

## 2021-12-15 PROCEDURE — 1170F FXNL STATUS ASSESSED: CPT | Performed by: PHYSICIAN ASSISTANT

## 2021-12-15 PROCEDURE — 83036 HEMOGLOBIN GLYCOSYLATED A1C: CPT | Performed by: PHYSICIAN ASSISTANT

## 2021-12-15 PROCEDURE — 90662 IIV NO PRSV INCREASED AG IM: CPT | Performed by: PHYSICIAN ASSISTANT

## 2021-12-15 PROCEDURE — G0439 PPPS, SUBSEQ VISIT: HCPCS | Performed by: PHYSICIAN ASSISTANT

## 2021-12-15 PROCEDURE — 1160F RVW MEDS BY RX/DR IN RCRD: CPT | Performed by: PHYSICIAN ASSISTANT

## 2021-12-15 PROCEDURE — 80061 LIPID PANEL: CPT

## 2021-12-15 NOTE — ASSESSMENT & PLAN NOTE
Hypertension is unchanged.  Continue current treatment regimen.  Dietary sodium restriction.  Weight loss.  Regular aerobic exercise.  Continue current medications.  Ambulatory blood pressure monitoring.  Blood pressure will be reassessed at the next regular appointment.

## 2021-12-15 NOTE — PROGRESS NOTES
The ABCs of the Annual Wellness Visit  Subsequent Medicare Wellness Visit    Chief Complaint   Patient presents with   • Medicare Wellness-subsequent   • Diabetes      Subjective    History of Present Illness:  Mariaelena Easley is a 76 y.o. female who presents for a Subsequent Medicare Wellness Visit.    Pt notes some pain in her left thumb yesterday, about the same today. Notices it when she moves her thumb, especially when she puts on her mask. No repetitive movement or injury.    Otherwise she is feeling well. Working to improve diet after  had scary episode with hypoglycemia.    The following portions of the patient's history were reviewed and   updated as appropriate: allergies, current medications, past family history, past medical history, past social history, past surgical history and problem list.    Compared to one year ago, the patient feels her physical   health is the same.    Compared to one year ago, the patient feels her mental   health is the same.    Recent Hospitalizations:  She was not admitted to the hospital during the last year.       Current Medical Providers:  Patient Care Team:  Nuris Cristobal PA as PCP - General (Internal Medicine)    Outpatient Medications Prior to Visit   Medication Sig Dispense Refill   • amLODIPine (NORVASC) 5 MG tablet Take 1 tablet by mouth Daily. 90 tablet 2   • Ascorbic Acid (NEVILLE-C PO) Take 1,000 mg by mouth Daily.     • atorvastatin (LIPITOR) 80 MG tablet Take 1 tablet by mouth Daily. 90 tablet 3   • Biotin w/ Vitamins C & E (HAIR SKIN & NAILS GUMMIES PO) Take 2 tablets by mouth Daily.     • Calcium 500-100 MG-UNIT chewable tablet Chew 1 tablet Daily.     • Coenzyme Q10 30 MG/5ML liquid Take 5 mL by mouth Daily.     • glucose blood (OneTouch Verio) test strip USE TO TEST 2-3 TIMES DAILY 300 each 3   • hydroCHLOROthiazide (HYDRODIURIL) 25 MG tablet TAKE 1 TABLET DAILY 90 tablet 2   • Lancets (OneTouch Delica Plus [id number]) misc USE AND DISCARD LANCET TO   CHECK BLOOD SUGAR TWO TIMESA  each 1   • metFORMIN ER (GLUCOPHAGE-XR) 500 MG 24 hr tablet TAKE 2 TABLETS EVERY       MORNING AND 2 TABLETS EVERYEVENING 360 tablet 2   • rizatriptan MLT (MAXALT-MLT) 10 MG disintegrating tablet Take 1 tablet by mouth 1 (One) Time As Needed for Migraine for up to 1 dose. May repeat in 2 hours if needed 9 tablet 5   • VITAMIN E PO Take 400 Units by mouth Daily.     • Vitamins A & D (VITAMIN A & D PO) Take 2 tablets by mouth Daily.     • promethazine (PHENERGAN) 25 MG tablet Take 1 tablet by mouth Every 6 (Six) Hours As Needed for Nausea or Vomiting. 20 tablet 0     No facility-administered medications prior to visit.       No opioid medication identified on active medication list. I have reviewed chart for other potential  high risk medication/s and harmful drug interactions in the elderly.          Aspirin is not on active medication list.  Aspirin use is not indicated based on review of current medical condition/s. Risk of harm outweighs potential benefits.  .    Patient Active Problem List   Diagnosis   • Anemia   • Hyperlipidemia   • Migraine headache   • Thyroid disease   • Episodic recurrent vertigo   • Osteopenia   • Seborrheic eczema   • Cyst of uterus   • Cyst of breast   • Essential hypertension   • Diabetes mellitus (HCC)   • Squamous cell skin cancer, face   • Chest pain     Advance Care Planning  Advance Directive is not on file.  ACP discussion was held with the patient during this visit. Patient has an advance directive (not in EMR), copy requested.    Review of Systems   Constitutional: Negative for activity change, appetite change, diaphoresis and fatigue.   HENT: Negative for congestion.    Respiratory: Negative for chest tightness, shortness of breath and wheezing.    Cardiovascular: Negative for chest pain and palpitations.   Gastrointestinal: Negative for abdominal pain, constipation, diarrhea and nausea.   Genitourinary: Negative for dysuria.  "  Musculoskeletal: Positive for arthralgias and myalgias.   Neurological: Negative for dizziness, weakness and light-headedness.   Psychiatric/Behavioral: Negative for decreased concentration. The patient is not nervous/anxious.         Objective    Vitals:    12/15/21 1245   BP: 126/74   Pulse: 82   Temp: 98.8 °F (37.1 °C)   SpO2: 97%   Weight: 71.5 kg (157 lb 9.6 oz)   Height: 162.6 cm (64\")   PainSc: 0-No pain     BMI Readings from Last 1 Encounters:   12/15/21 27.05 kg/m²   BMI is above normal parameters. Recommendations include: exercise counseling and nutrition counseling    Does the patient have evidence of cognitive impairment? No    Physical Exam  Vitals and nursing note reviewed.   Constitutional:       Appearance: She is well-developed.   HENT:      Head: Normocephalic.      Right Ear: Hearing, tympanic membrane, ear canal and external ear normal.      Left Ear: Hearing, tympanic membrane, ear canal and external ear normal.      Nose: Nose normal.   Eyes:      Conjunctiva/sclera: Conjunctivae normal.      Pupils: Pupils are equal, round, and reactive to light.   Cardiovascular:      Rate and Rhythm: Normal rate and regular rhythm.      Heart sounds: Normal heart sounds.   Pulmonary:      Effort: Pulmonary effort is normal.      Breath sounds: Normal breath sounds. No decreased breath sounds, wheezing, rhonchi or rales.   Musculoskeletal:         General: Normal range of motion.      Cervical back: Normal range of motion.   Skin:     General: Skin is warm and dry.   Neurological:      Mental Status: She is alert.   Psychiatric:         Behavior: Behavior normal.       Lab Results   Component Value Date    TRIG 71 12/15/2021    HDL 62 (H) 12/15/2021    LDL 97 12/15/2021    VLDL 13 12/15/2021    HGBA1C 6.7 12/15/2021            HEALTH RISK ASSESSMENT    Smoking Status:  Social History     Tobacco Use   Smoking Status Former Smoker   • Packs/day: 1.00   • Years: 15.00   • Pack years: 15.00   • Types: " Cigarettes   • Quit date: 12/15/1995   • Years since quittin.0   Smokeless Tobacco Never Used     Alcohol Consumption:  Social History     Substance and Sexual Activity   Alcohol Use Yes     Fall Risk Screen:    MANANADI Fall Risk Assessment was completed, and patient is at LOW risk for falls.Assessment completed on:12/15/2021    Depression Screening:  PHQ-2/PHQ-9 Depression Screening 12/15/2021   Little interest or pleasure in doing things 0   Feeling down, depressed, or hopeless 0   Trouble falling or staying asleep, or sleeping too much -   Feeling tired or having little energy -   Poor appetite or overeating -   Feeling bad about yourself - or that you are a failure or have let yourself or your family down -   Trouble concentrating on things, such as reading the newspaper or watching television -   Moving or speaking so slowly that other people could have noticed. Or the opposite - being so fidgety or restless that you have been moving around a lot more than usual -   Total Score 0   If you checked off any problems, how difficult have these problems made it for you to do your work, take care of things at home, or get along with other people? -       Health Habits and Functional and Cognitive Screening:  Functional & Cognitive Status 12/15/2021   Do you have difficulty preparing food and eating? No   Do you have difficulty bathing yourself, getting dressed or grooming yourself? No   Do you have difficulty using the toilet? No   Do you have difficulty moving around from place to place? No   Do you have trouble with steps or getting out of a bed or a chair? No   Current Diet Well Balanced Diet   Dental Exam Up to date   Eye Exam Up to date   Exercise (times per week) 0 times per week   Current Exercises Include No Regular Exercise   Current Exercise Activities Include -   Do you need help using the phone?  No   Are you deaf or do you have serious difficulty hearing?  No   Do you need help with transportation? No    Do you need help shopping? No   Do you need help preparing meals?  No   Do you need help with housework?  No   Do you need help with laundry? No   Do you need help taking your medications? No   Do you need help managing money? No   Do you ever drive or ride in a car without wearing a seat belt? No   Have you felt unusual stress, anger or loneliness in the last month? No   Who do you live with? Spouse   If you need help, do you have trouble finding someone available to you? No   Have you been bothered in the last four weeks by sexual problems? No   Do you have difficulty concentrating, remembering or making decisions? Yes       Age-appropriate Screening Schedule:  Refer to the list below for future screening recommendations based on patient's age, sex and/or medical conditions. Orders for these recommended tests are listed in the plan section. The patient has been provided with a written plan.    Health Maintenance   Topic Date Due   • DIABETIC EYE EXAM  08/26/2021   • ZOSTER VACCINE (2 of 2) 12/15/2022 (Originally 2/26/2012)   • HEMOGLOBIN A1C  06/15/2022   • MAMMOGRAM  10/08/2022   • DXA SCAN  10/08/2022   • DIABETIC FOOT EXAM  12/15/2022   • LIPID PANEL  12/15/2022   • URINE MICROALBUMIN  12/15/2022   • TDAP/TD VACCINES (3 - Td or Tdap) 06/01/2024   • INFLUENZA VACCINE  Completed              Assessment/Plan   CMS Preventative Services Quick Reference  Risk Factors Identified During Encounter  Immunizations Discussed/Encouraged (specific Immunizations; Influenza, Shingrix and COVID19  Inactivity/Sedentary  Obesity/Overweight   The above risks/problems have been discussed with the patient.  Follow up actions/plans if indicated are seen below in the Assessment/Plan Section.  Pertinent information has been shared with the patient in the After Visit Summary.    Diagnoses and all orders for this visit:    1. Encounter for Medicare annual wellness exam (Primary)    2. Type 2 diabetes mellitus without complication,  without long-term current use of insulin (HCC)  Assessment & Plan:  Diabetes is improving with lifestyle modifications.   Continue current treatment regimen.  Reminded to bring in blood sugar diary at next visit.  Dietary recommendations for ADA diet.  Regular aerobic exercise.  Diabetes will be reassessed in 3 months.    Orders:  -     POC Glycosylated Hemoglobin (Hb A1C)  -     POCT Glucose  -     Microalbumin / Creatinine Urine Ratio - Urine, Clean Catch; Future  -     Comprehensive Metabolic Panel; Future  -     Lipid Panel; Future    3. Need for influenza vaccination  -     Fluzone High-Dose 65+yrs    4. Thyroid disease  -     CBC & Differential; Future  -     TSH; Future    5. Essential hypertension  Assessment & Plan:  Hypertension is unchanged.  Continue current treatment regimen.  Dietary sodium restriction.  Weight loss.  Regular aerobic exercise.  Continue current medications.  Ambulatory blood pressure monitoring.  Blood pressure will be reassessed at the next regular appointment.        Follow Up:   Return in about 3 months (around 3/15/2022) for Follow up, Medicare Wellness in 1 year.     An After Visit Summary and PPPS were made available to the patient.

## 2021-12-16 LAB
ALBUMIN SERPL-MCNC: 4.6 G/DL (ref 3.5–5.2)
ALBUMIN UR-MCNC: <1.2 MG/DL
ALBUMIN/GLOB SERPL: 1.5 G/DL
ALP SERPL-CCNC: 83 U/L (ref 39–117)
ALT SERPL W P-5'-P-CCNC: 11 U/L (ref 1–33)
ANION GAP SERPL CALCULATED.3IONS-SCNC: 11.1 MMOL/L (ref 5–15)
AST SERPL-CCNC: 17 U/L (ref 1–32)
BASOPHILS # BLD AUTO: 0.05 10*3/MM3 (ref 0–0.2)
BASOPHILS NFR BLD AUTO: 0.5 % (ref 0–1.5)
BILIRUB SERPL-MCNC: 0.5 MG/DL (ref 0–1.2)
BUN SERPL-MCNC: 14 MG/DL (ref 8–23)
BUN/CREAT SERPL: 21.9 (ref 7–25)
CALCIUM SPEC-SCNC: 10.1 MG/DL (ref 8.6–10.5)
CHLORIDE SERPL-SCNC: 94 MMOL/L (ref 98–107)
CHOLEST SERPL-MCNC: 172 MG/DL (ref 0–200)
CO2 SERPL-SCNC: 29.9 MMOL/L (ref 22–29)
CREAT SERPL-MCNC: 0.64 MG/DL (ref 0.57–1)
CREAT UR-MCNC: 47.9 MG/DL
DEPRECATED RDW RBC AUTO: 40 FL (ref 37–54)
EOSINOPHIL # BLD AUTO: 0.07 10*3/MM3 (ref 0–0.4)
EOSINOPHIL NFR BLD AUTO: 0.7 % (ref 0.3–6.2)
ERYTHROCYTE [DISTWIDTH] IN BLOOD BY AUTOMATED COUNT: 12.6 % (ref 12.3–15.4)
GFR SERPL CREATININE-BSD FRML MDRD: 91 ML/MIN/1.73
GLOBULIN UR ELPH-MCNC: 3.1 GM/DL
GLUCOSE SERPL-MCNC: 108 MG/DL (ref 65–99)
HCT VFR BLD AUTO: 39.7 % (ref 34–46.6)
HDLC SERPL-MCNC: 62 MG/DL (ref 40–60)
HGB BLD-MCNC: 12.8 G/DL (ref 12–15.9)
IMM GRANULOCYTES # BLD AUTO: 0.03 10*3/MM3 (ref 0–0.05)
IMM GRANULOCYTES NFR BLD AUTO: 0.3 % (ref 0–0.5)
LDLC SERPL CALC-MCNC: 97 MG/DL (ref 0–100)
LDLC/HDLC SERPL: 1.55 {RATIO}
LYMPHOCYTES # BLD AUTO: 2.21 10*3/MM3 (ref 0.7–3.1)
LYMPHOCYTES NFR BLD AUTO: 20.9 % (ref 19.6–45.3)
MCH RBC QN AUTO: 28.1 PG (ref 26.6–33)
MCHC RBC AUTO-ENTMCNC: 32.2 G/DL (ref 31.5–35.7)
MCV RBC AUTO: 87.3 FL (ref 79–97)
MICROALBUMIN/CREAT UR: NORMAL MG/G{CREAT}
MONOCYTES # BLD AUTO: 0.83 10*3/MM3 (ref 0.1–0.9)
MONOCYTES NFR BLD AUTO: 7.8 % (ref 5–12)
NEUTROPHILS NFR BLD AUTO: 69.8 % (ref 42.7–76)
NEUTROPHILS NFR BLD AUTO: 7.39 10*3/MM3 (ref 1.7–7)
NRBC BLD AUTO-RTO: 0 /100 WBC (ref 0–0.2)
PLATELET # BLD AUTO: 354 10*3/MM3 (ref 140–450)
PMV BLD AUTO: 10.1 FL (ref 6–12)
POTASSIUM SERPL-SCNC: 4.2 MMOL/L (ref 3.5–5.2)
PROT SERPL-MCNC: 7.7 G/DL (ref 6–8.5)
RBC # BLD AUTO: 4.55 10*6/MM3 (ref 3.77–5.28)
SODIUM SERPL-SCNC: 135 MMOL/L (ref 136–145)
TRIGL SERPL-MCNC: 71 MG/DL (ref 0–150)
TSH SERPL DL<=0.05 MIU/L-ACNC: 1.07 UIU/ML (ref 0.27–4.2)
VLDLC SERPL-MCNC: 13 MG/DL (ref 5–40)
WBC NRBC COR # BLD: 10.58 10*3/MM3 (ref 3.4–10.8)

## 2021-12-20 ENCOUNTER — TELEPHONE (OUTPATIENT)
Dept: INTERNAL MEDICINE | Facility: CLINIC | Age: 76
End: 2021-12-20

## 2021-12-20 DIAGNOSIS — I10 ESSENTIAL HYPERTENSION: ICD-10-CM

## 2021-12-20 DIAGNOSIS — E11.9 TYPE 2 DIABETES MELLITUS WITHOUT COMPLICATION, WITHOUT LONG-TERM CURRENT USE OF INSULIN (HCC): ICD-10-CM

## 2021-12-20 DIAGNOSIS — E11.65 TYPE 2 DIABETES MELLITUS WITH HYPERGLYCEMIA, WITHOUT LONG-TERM CURRENT USE OF INSULIN (HCC): Primary | ICD-10-CM

## 2021-12-20 RX ORDER — AMLODIPINE BESYLATE 5 MG/1
TABLET ORAL
Qty: 90 TABLET | Refills: 0 | Status: SHIPPED | OUTPATIENT
Start: 2021-12-20 | End: 2022-03-07

## 2021-12-20 RX ORDER — METFORMIN HYDROCHLORIDE 500 MG/1
TABLET, EXTENDED RELEASE ORAL
Qty: 360 TABLET | Refills: 2 | OUTPATIENT
Start: 2021-12-20

## 2021-12-20 RX ORDER — AMLODIPINE BESYLATE 5 MG/1
5 TABLET ORAL DAILY
Qty: 90 TABLET | Refills: 2 | OUTPATIENT
Start: 2021-12-20

## 2021-12-20 RX ORDER — HYDROCHLOROTHIAZIDE 25 MG/1
25 TABLET ORAL DAILY
Qty: 90 TABLET | Refills: 2 | OUTPATIENT
Start: 2021-12-20

## 2021-12-20 RX ORDER — METFORMIN HYDROCHLORIDE 500 MG/1
TABLET, EXTENDED RELEASE ORAL
Qty: 360 TABLET | Refills: 0 | Status: SHIPPED | OUTPATIENT
Start: 2021-12-20 | End: 2022-03-07

## 2021-12-20 RX ORDER — HYDROCHLOROTHIAZIDE 25 MG/1
TABLET ORAL
Qty: 90 TABLET | Refills: 0 | Status: SHIPPED | OUTPATIENT
Start: 2021-12-20 | End: 2022-03-07

## 2021-12-20 NOTE — TELEPHONE ENCOUNTER
Caller: Mariaelena Easley    Relationship: Self    Best call back number: [phone] (H)    Requested Prescriptions:   Requested Prescriptions     Pending Prescriptions Disp Refills   • amLODIPine (NORVASC) 5 MG tablet 90 tablet 2     Sig: Take 1 tablet by mouth Daily.   • hydroCHLOROthiazide (HYDRODIURIL) 25 MG tablet 90 tablet 2     Sig: Take 1 tablet by mouth Daily.   • metFORMIN ER (GLUCOPHAGE-XR) 500 MG 24 hr tablet 360 tablet 2     Sig: TAKE 2 TABLETS EVERY       MORNING AND 2 TABLETS EVERYEVENING        [facility] [address] - [phone]  - [phone] FX     Additional details provided by patient: PATIENT WILL BE OUT OF MEDICATION THIS WEEK     Does the patient have less than a 3 day supply:  [x] Yes  [] No    Estella Almonte Rep   12/20/21 10:34 EST

## 2021-12-20 NOTE — TELEPHONE ENCOUNTER
Caller: Mariaelena Easley    Relationship: Self    Best call back number: [phone] (H)    PATIENT CALLED AND STATED THAT SHE WAS NOT ABLE TO GIVE MUCH OF A URIN SAMPLE AND WANTS TO KNOW IF THE PROVIDER CAN PUT IN A NEW ORDER THAT WAY SHE CAN COME TO THE LAB IN THE MORNING AND REDO IT     PLEASE ADVISE

## 2021-12-21 ENCOUNTER — LAB (OUTPATIENT)
Dept: LAB | Facility: HOSPITAL | Age: 76
End: 2021-12-21

## 2021-12-21 DIAGNOSIS — E11.65 TYPE 2 DIABETES MELLITUS WITH HYPERGLYCEMIA, WITHOUT LONG-TERM CURRENT USE OF INSULIN (HCC): ICD-10-CM

## 2021-12-21 PROCEDURE — 82043 UR ALBUMIN QUANTITATIVE: CPT

## 2021-12-21 PROCEDURE — 82570 ASSAY OF URINE CREATININE: CPT

## 2021-12-22 LAB
ALBUMIN UR-MCNC: <1.2 MG/DL
CREAT UR-MCNC: 52.6 MG/DL
MICROALBUMIN/CREAT UR: NORMAL MG/G{CREAT}

## 2021-12-26 NOTE — PROGRESS NOTES
What Type Of Note Output Would You Prefer (Optional)?: Standard Output Your kidney function looks fine. How Severe Is Your Skin Lesion?: mild Has Your Skin Lesion Been Treated?: not been treated Is This A New Presentation, Or A Follow-Up?: Skin Lesion

## 2022-02-22 ENCOUNTER — TELEPHONE (OUTPATIENT)
Dept: INTERNAL MEDICINE | Facility: CLINIC | Age: 77
End: 2022-02-22

## 2022-02-22 NOTE — TELEPHONE ENCOUNTER
She should try stopping her metformin. Also can stop her hctz while having diarrhea so she does not get dehydrated. Switch from coke zero to water and avoid dairy products. She can be seen if not getting better.    Typically the freestyle andrew is covered for patients who are on insulin and checking BS multiple times a day.

## 2022-02-22 NOTE — TELEPHONE ENCOUNTER
PATIENT HAVING DIARRHEA FOR A WEEK NOW.  LIVING ON COKE ZERO AND LANCES CRACKERS.  TAKING IMODIUM.  PATIENT WOULD LIKE TO KNOW IF THERE IS MEDICATION STRONGER THAN IMODIUM.     PATIENT WOULD ALSO LIKE TO DISCUSS GETTING [employer] VALENTE 14 DAY MONITOR FOR HER ARM.      PLEASE CALL [phone]

## 2022-03-07 DIAGNOSIS — I10 ESSENTIAL HYPERTENSION: ICD-10-CM

## 2022-03-07 DIAGNOSIS — E11.9 TYPE 2 DIABETES MELLITUS WITHOUT COMPLICATION, WITHOUT LONG-TERM CURRENT USE OF INSULIN: ICD-10-CM

## 2022-03-07 RX ORDER — HYDROCHLOROTHIAZIDE 25 MG/1
TABLET ORAL
Qty: 90 TABLET | Refills: 0 | Status: SHIPPED | OUTPATIENT
Start: 2022-03-07 | End: 2022-06-04

## 2022-03-07 RX ORDER — METFORMIN HYDROCHLORIDE 500 MG/1
TABLET, EXTENDED RELEASE ORAL
Qty: 360 TABLET | Refills: 0 | Status: SHIPPED | OUTPATIENT
Start: 2022-03-07 | End: 2022-06-04

## 2022-03-07 RX ORDER — AMLODIPINE BESYLATE 5 MG/1
TABLET ORAL
Qty: 90 TABLET | Refills: 0 | Status: SHIPPED | OUTPATIENT
Start: 2022-03-07 | End: 2022-06-04

## 2022-03-17 ENCOUNTER — PATIENT MESSAGE (OUTPATIENT)
Dept: INTERNAL MEDICINE | Facility: CLINIC | Age: 77
End: 2022-03-17

## 2022-03-18 ENCOUNTER — OFFICE VISIT (OUTPATIENT)
Dept: INTERNAL MEDICINE | Facility: CLINIC | Age: 77
End: 2022-03-18

## 2022-03-18 VITALS
BODY MASS INDEX: 26.8 KG/M2 | DIASTOLIC BLOOD PRESSURE: 80 MMHG | OXYGEN SATURATION: 99 % | HEART RATE: 76 BPM | WEIGHT: 157 LBS | TEMPERATURE: 98.2 F | SYSTOLIC BLOOD PRESSURE: 138 MMHG | HEIGHT: 64 IN

## 2022-03-18 DIAGNOSIS — R05.9 COUGH: Primary | ICD-10-CM

## 2022-03-18 PROCEDURE — U0004 COV-19 TEST NON-CDC HGH THRU: HCPCS | Performed by: INTERNAL MEDICINE

## 2022-03-18 PROCEDURE — 99213 OFFICE O/P EST LOW 20 MIN: CPT | Performed by: INTERNAL MEDICINE

## 2022-03-18 NOTE — PROGRESS NOTES
Subjective   Mariaelena Easley is a 76 y.o. female.   Chief Complaint   Patient presents with   • Nasal Congestion     X 1 week   • Cough       History of Present Illness   Nasal congestion and cough x 1 week. Started after doing some yardwork. Took OTC allergy med.   The following portions of the patient's history were reviewed and updated as appropriate: allergies, current medications, past family history, past medical history, past social history, past surgical history and problem list.  Past Medical History:   Diagnosis Date   • Cancer (HCC)    • Cyst of uterus    • Diabetes mellitus (HCC)    • Eczema    • Hyperlipidemia    • Hypertension    • Seborrheic dermatitis    • Thyroid disease      Past Surgical History:   Procedure Laterality Date   • AUGMENTATION MAMMAPLASTY Bilateral    • BREAST EXCISIONAL BIOPSY Right    •  SECTION       Family History   Problem Relation Age of Onset   • Heart disease Mother    • Arthritis Father    • Heart attack Father    • Skin cancer Father    • COPD Father    • Lung cancer Sister    • Myelodysplastic syndrome Sister    • Diabetes Brother    • Heart attack Brother    • Hyperlipidemia Brother    • Breast cancer Neg Hx    • Ovarian cancer Neg Hx      Social History     Socioeconomic History   • Marital status:    Tobacco Use   • Smoking status: Former Smoker     Packs/day: 1.00     Years: 15.00     Pack years: 15.00     Types: Cigarettes     Quit date: 12/15/1995     Years since quittin.2   • Smokeless tobacco: Never Used   Vaping Use   • Vaping Use: Never used   Substance and Sexual Activity   • Alcohol use: Yes   • Drug use: No   • Sexual activity: Defer     Current Outpatient Medications on File Prior to Visit   Medication Sig Dispense Refill   • amLODIPine (NORVASC) 5 MG tablet TAKE 1 TABLET DAILY 90 tablet 0   • Ascorbic Acid (NEVILLE-C PO) Take 1,000 mg by mouth Daily.     • atorvastatin (LIPITOR) 80 MG tablet Take 1 tablet by mouth Daily. 90 tablet 3   •  "Biotin w/ Vitamins C & E (HAIR SKIN & NAILS GUMMIES PO) Take 2 tablets by mouth Daily.     • Calcium 500-100 MG-UNIT chewable tablet Chew 1 tablet Daily.     • Coenzyme Q10 30 MG/5ML liquid Take 5 mL by mouth Daily.     • Fexofenadine HCl (ALLERGY 24-HR PO) Take  by mouth.     • glucose blood (OneTouch Verio) test strip USE TO TEST 2-3 TIMES DAILY 300 each 3   • hydroCHLOROthiazide (HYDRODIURIL) 25 MG tablet TAKE 1 TABLET DAILY 90 tablet 0   • Lancets (OneTouch Delica Plus [id number]) misc USE AND DISCARD LANCET TO  CHECK BLOOD SUGAR TWO TIMESA  each 1   • metFORMIN ER (GLUCOPHAGE-XR) 500 MG 24 hr tablet TAKE 2 TABLETS EVERY       MORNING AND 2 TABLETS EVERYEVENING 360 tablet 0   • rizatriptan MLT (MAXALT-MLT) 10 MG disintegrating tablet Take 1 tablet by mouth 1 (One) Time As Needed for Migraine for up to 1 dose. May repeat in 2 hours if needed 9 tablet 5   • VITAMIN E PO Take 400 Units by mouth Daily.     • Vitamins A & D (VITAMIN A & D PO) Take 2 tablets by mouth Daily.       No current facility-administered medications on file prior to visit.       Review of Systems   Constitutional: Negative for chills and fever.   HENT: Positive for congestion.    Respiratory: Positive for cough. Negative for shortness of breath.    Cardiovascular: Negative for chest pain and leg swelling.   Psychiatric/Behavioral: Negative for confusion. The patient is not nervous/anxious.      /80   Pulse 76   Temp 98.2 °F (36.8 °C)   Ht 162.6 cm (64\")   Wt 71.2 kg (157 lb)   LMP  (LMP Unknown)   SpO2 99%   BMI 26.95 kg/m²     Objective   Physical Exam  Vitals reviewed.   Cardiovascular:      Rate and Rhythm: Normal rate and regular rhythm.   Pulmonary:      Effort: No respiratory distress.      Breath sounds: No wheezing or rales.   Neurological:      Mental Status: She is alert.   Psychiatric:         Behavior: Behavior normal.         Assessment/Plan   Diagnoses and all orders for this visit:    1. Cough (Primary)  -     " COVID-19 PCR, [facility], NP SWAB IN [facility] VIRAL TRANSPORT MEDIA/ORAL SWISH 24-30 HR TAT - Swab, Nasopharynx; Future  -     COVID-19 PCR, [facility], NP SWAB IN [facility] VIRAL TRANSPORT MEDIA/ORAL SWISH 24-30 HR TAT - Swab, Nasopharynx  Can use OTC cough syrup prn.

## 2022-03-19 LAB — SARS-COV-2 RNA NOSE QL NAA+PROBE: NOT DETECTED

## 2022-04-13 ENCOUNTER — LAB (OUTPATIENT)
Dept: LAB | Facility: HOSPITAL | Age: 77
End: 2022-04-13

## 2022-04-13 ENCOUNTER — OFFICE VISIT (OUTPATIENT)
Dept: INTERNAL MEDICINE | Facility: CLINIC | Age: 77
End: 2022-04-13

## 2022-04-13 VITALS
OXYGEN SATURATION: 98 % | BODY MASS INDEX: 26.63 KG/M2 | WEIGHT: 156 LBS | SYSTOLIC BLOOD PRESSURE: 138 MMHG | HEIGHT: 64 IN | DIASTOLIC BLOOD PRESSURE: 80 MMHG | TEMPERATURE: 96.9 F | HEART RATE: 72 BPM

## 2022-04-13 DIAGNOSIS — R19.7 DIARRHEA, UNSPECIFIED TYPE: ICD-10-CM

## 2022-04-13 DIAGNOSIS — E11.65 TYPE 2 DIABETES MELLITUS WITH HYPERGLYCEMIA, WITHOUT LONG-TERM CURRENT USE OF INSULIN: ICD-10-CM

## 2022-04-13 DIAGNOSIS — I10 ESSENTIAL HYPERTENSION: ICD-10-CM

## 2022-04-13 DIAGNOSIS — E11.65 TYPE 2 DIABETES MELLITUS WITH HYPERGLYCEMIA, WITHOUT LONG-TERM CURRENT USE OF INSULIN: Primary | ICD-10-CM

## 2022-04-13 LAB
EXPIRATION DATE: ABNORMAL
HBA1C MFR BLD: 6.4 %
Lab: ABNORMAL

## 2022-04-13 PROCEDURE — 80053 COMPREHEN METABOLIC PANEL: CPT

## 2022-04-13 PROCEDURE — 99214 OFFICE O/P EST MOD 30 MIN: CPT | Performed by: PHYSICIAN ASSISTANT

## 2022-04-13 PROCEDURE — 3044F HG A1C LEVEL LT 7.0%: CPT | Performed by: PHYSICIAN ASSISTANT

## 2022-04-13 PROCEDURE — 83036 HEMOGLOBIN GLYCOSYLATED A1C: CPT | Performed by: PHYSICIAN ASSISTANT

## 2022-04-13 NOTE — PROGRESS NOTES
Chief Complaint   Patient presents with   • Follow-up       Subjective     Mariaelena Easley is a 76 y.o. female.        History of Present Illness     Pt tried eliminating the metformin to see if it helped with diarrhea. Did not see a difference so she started the metformin back again. Her morning blood sugars have been running around 140-150. Her diarrhea has now resolved. Wonders if it is related to sensitive stomach her family is known to have.    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 Has found that she has some back pain when she lifts boxes or stands at the kitchen sink for very long. She can sit down and rest. Puts some otc roll on menthol or biofreeze. Sometimes takes some aspirin at night.    Notes that she had 1 silent migraine since the last time she was here- had vertigo, then n/v, then diarrhea. May have been related to stress.    She was seen for cold symptoms a month ago, thought to be related to allergy symptoms. Had negative Covid swab.        Current Outpatient Medications:   •  amLODIPine (NORVASC) 5 MG tablet, TAKE 1 TABLET DAILY, Disp: 90 tablet, Rfl: 0  •  Ascorbic Acid (NEVILLE-C PO), Take 1,000 mg by mouth Daily., Disp: , Rfl:   •  atorvastatin (LIPITOR) 80 MG tablet, Take 1 tablet by mouth Daily., Disp: 90 tablet, Rfl: 3  •  Biotin w/ Vitamins C & E (HAIR SKIN & NAILS GUMMIES PO), Take 2 tablets by mouth Daily., Disp: , Rfl:   •  Calcium 500-100 MG-UNIT chewable tablet, Chew 1 tablet Daily., Disp: , Rfl:   •  Coenzyme Q10 30 MG/5ML liquid, Take 5 mL by mouth Daily., Disp: , Rfl:   •  Fexofenadine HCl (ALLERGY 24-HR PO), Take  by mouth., Disp: , Rfl:   •  glucose blood (OneTouch Verio) test strip, USE TO TEST 2-3 TIMES DAILY, Disp: 300 each, Rfl: 3  •  hydroCHLOROthiazide (HYDRODIURIL) 25 MG tablet, TAKE 1 TABLET DAILY, Disp: 90 tablet, Rfl: 0  •  Lancets (OneTouch Delica Plus [id number]) misc, USE AND DISCARD LANCET TO  CHECK BLOOD SUGAR TWO TIMESA DAY, Disp: 200  "each, Rfl: 1  •  metFORMIN ER (GLUCOPHAGE-XR) 500 MG 24 hr tablet, TAKE 2 TABLETS EVERY       MORNING AND 2 TABLETS EVERYEVENING, Disp: 360 tablet, Rfl: 0  •  rizatriptan MLT (MAXALT-MLT) 10 MG disintegrating tablet, Take 1 tablet by mouth 1 (One) Time As Needed for Migraine for up to 1 dose. May repeat in 2 hours if needed, Disp: 9 tablet, Rfl: 5  •  VITAMIN E PO, Take 400 Units by mouth Daily., Disp: , Rfl:   •  Vitamins A & D (VITAMIN A & D PO), Take 2 tablets by mouth Daily., Disp: , Rfl:      PMFSH  The following portions of the patient's history were reviewed and updated as appropriate: allergies, current medications, past family history, past medical history, past social history, past surgical history and problem list.    Review of Systems   Constitutional: Negative for activity change, appetite change and fatigue.   HENT: Negative for congestion and rhinorrhea.    Respiratory: Negative for chest tightness and shortness of breath.    Cardiovascular: Negative for chest pain and palpitations.   Gastrointestinal: Negative for abdominal pain.   Genitourinary: Negative for dysuria.   Musculoskeletal: Negative for arthralgias and myalgias.   Neurological: Negative for dizziness, weakness, light-headedness and headaches.   Psychiatric/Behavioral: Negative for dysphoric mood. The patient is not nervous/anxious.        Objective   /80   Pulse 72   Temp 96.9 °F (36.1 °C)   Ht 162.6 cm (64\")   Wt 70.8 kg (156 lb)   LMP  (LMP Unknown)   SpO2 98%   Breastfeeding No   BMI 26.78 kg/m²     Physical Exam  Vitals and nursing note reviewed.   Constitutional:       Appearance: She is well-developed.   HENT:      Head: Normocephalic and atraumatic.      Right Ear: External ear normal.      Left Ear: External ear normal.   Eyes:      Conjunctiva/sclera: Conjunctivae normal.   Cardiovascular:      Rate and Rhythm: Normal rate and regular rhythm.   Pulmonary:      Effort: Pulmonary effort is normal.      Breath sounds: " Normal breath sounds.   Musculoskeletal:         General: Normal range of motion.      Cervical back: Normal range of motion.   Skin:     General: Skin is warm and dry.   Psychiatric:         Behavior: Behavior normal.         Results for orders placed or performed in visit on 04/13/22   POC Glycosylated Hemoglobin (Hb A1C)    Specimen: Blood   Result Value Ref Range    Hemoglobin A1C 6.4 %    Lot Number [id number]     Expiration Date 2024-01-17         ASSESSMENT/PLAN    Diagnoses and all orders for this visit:    1. Type 2 diabetes mellitus with hyperglycemia, without long-term current use of insulin (HCC) (Primary)  Assessment & Plan:  Diabetes is improving with lifestyle modifications.   Continue current treatment regimen.  Reminded to bring in blood sugar diary at next visit.  Dietary recommendations for ADA diet.  Regular aerobic exercise.  Diabetes will be reassessed 4 months.    Orders:  -     POC Glycosylated Hemoglobin (Hb A1C)  -     Comprehensive Metabolic Panel; Future    2. Essential hypertension  Assessment & Plan:  Hypertension is improving with treatment.  Continue current treatment regimen.  Dietary sodium restriction.  Weight loss.  Regular aerobic exercise.  Continue current medications.  Ambulatory blood pressure monitoring.  Blood pressure will be reassessed at the next regular appointment.      3. Diarrhea, unspecified type  Comments:  Improved with elimination of supplements.    Other orders  -     Cancel: TSH; Future           Return in about 4 months (around 8/13/2022).

## 2022-04-13 NOTE — ASSESSMENT & PLAN NOTE
Diabetes is improving with lifestyle modifications.   Continue current treatment regimen.  Reminded to bring in blood sugar diary at next visit.  Dietary recommendations for ADA diet.  Regular aerobic exercise.  Diabetes will be reassessed 4 months.

## 2022-04-14 LAB
ALBUMIN SERPL-MCNC: 4.5 G/DL (ref 3.5–5.2)
ALBUMIN/GLOB SERPL: 1.6 G/DL
ALP SERPL-CCNC: 80 U/L (ref 39–117)
ALT SERPL W P-5'-P-CCNC: 28 U/L (ref 1–33)
ANION GAP SERPL CALCULATED.3IONS-SCNC: 12.2 MMOL/L (ref 5–15)
AST SERPL-CCNC: 31 U/L (ref 1–32)
BILIRUB SERPL-MCNC: 0.6 MG/DL (ref 0–1.2)
BUN SERPL-MCNC: 14 MG/DL (ref 8–23)
BUN/CREAT SERPL: 17.5 (ref 7–25)
CALCIUM SPEC-SCNC: 10.2 MG/DL (ref 8.6–10.5)
CHLORIDE SERPL-SCNC: 96 MMOL/L (ref 98–107)
CO2 SERPL-SCNC: 27.8 MMOL/L (ref 22–29)
CREAT SERPL-MCNC: 0.8 MG/DL (ref 0.57–1)
EGFRCR SERPLBLD CKD-EPI 2021: 77.4 ML/MIN/1.73
GLOBULIN UR ELPH-MCNC: 2.9 GM/DL
GLUCOSE SERPL-MCNC: 101 MG/DL (ref 65–99)
POTASSIUM SERPL-SCNC: 4.2 MMOL/L (ref 3.5–5.2)
PROT SERPL-MCNC: 7.4 G/DL (ref 6–8.5)
SODIUM SERPL-SCNC: 136 MMOL/L (ref 136–145)

## 2022-06-04 DIAGNOSIS — I10 ESSENTIAL HYPERTENSION: ICD-10-CM

## 2022-06-04 DIAGNOSIS — E11.9 TYPE 2 DIABETES MELLITUS WITHOUT COMPLICATION, WITHOUT LONG-TERM CURRENT USE OF INSULIN: ICD-10-CM

## 2022-06-04 RX ORDER — AMLODIPINE BESYLATE 5 MG/1
TABLET ORAL
Qty: 90 TABLET | Refills: 0 | Status: SHIPPED | OUTPATIENT
Start: 2022-06-04 | End: 2022-08-15 | Stop reason: SDUPTHER

## 2022-06-04 RX ORDER — HYDROCHLOROTHIAZIDE 25 MG/1
TABLET ORAL
Qty: 90 TABLET | Refills: 0 | Status: SHIPPED | OUTPATIENT
Start: 2022-06-04 | End: 2022-08-15 | Stop reason: SDUPTHER

## 2022-06-04 RX ORDER — METFORMIN HYDROCHLORIDE 500 MG/1
TABLET, EXTENDED RELEASE ORAL
Qty: 360 TABLET | Refills: 0 | Status: SHIPPED | OUTPATIENT
Start: 2022-06-04 | End: 2022-08-15 | Stop reason: SDUPTHER

## 2022-07-01 DIAGNOSIS — E78.5 HYPERLIPIDEMIA, UNSPECIFIED HYPERLIPIDEMIA TYPE: ICD-10-CM

## 2022-07-01 RX ORDER — ATORVASTATIN CALCIUM 80 MG/1
TABLET, FILM COATED ORAL
Qty: 90 TABLET | Refills: 3 | Status: SHIPPED | OUTPATIENT
Start: 2022-07-01

## 2022-08-15 ENCOUNTER — OFFICE VISIT (OUTPATIENT)
Dept: INTERNAL MEDICINE | Facility: CLINIC | Age: 77
End: 2022-08-15

## 2022-08-15 VITALS
TEMPERATURE: 99 F | WEIGHT: 158 LBS | DIASTOLIC BLOOD PRESSURE: 90 MMHG | BODY MASS INDEX: 27.12 KG/M2 | HEART RATE: 69 BPM | OXYGEN SATURATION: 95 % | SYSTOLIC BLOOD PRESSURE: 142 MMHG

## 2022-08-15 DIAGNOSIS — M54.41 CHRONIC BILATERAL LOW BACK PAIN WITH RIGHT-SIDED SCIATICA: ICD-10-CM

## 2022-08-15 DIAGNOSIS — I10 ESSENTIAL HYPERTENSION: ICD-10-CM

## 2022-08-15 DIAGNOSIS — G89.29 CHRONIC BILATERAL LOW BACK PAIN WITH RIGHT-SIDED SCIATICA: ICD-10-CM

## 2022-08-15 DIAGNOSIS — Z78.0 POST-MENOPAUSAL: ICD-10-CM

## 2022-08-15 DIAGNOSIS — E11.9 TYPE 2 DIABETES MELLITUS WITHOUT COMPLICATION, WITHOUT LONG-TERM CURRENT USE OF INSULIN: ICD-10-CM

## 2022-08-15 DIAGNOSIS — E11.65 TYPE 2 DIABETES MELLITUS WITH HYPERGLYCEMIA, WITHOUT LONG-TERM CURRENT USE OF INSULIN: Primary | ICD-10-CM

## 2022-08-15 LAB
EXPIRATION DATE: NORMAL
GLUCOSE BLDC GLUCOMTR-MCNC: 111 MG/DL (ref 70–130)
HBA1C MFR BLD: 6.5 %
Lab: NORMAL

## 2022-08-15 PROCEDURE — 99214 OFFICE O/P EST MOD 30 MIN: CPT | Performed by: PHYSICIAN ASSISTANT

## 2022-08-15 PROCEDURE — 82962 GLUCOSE BLOOD TEST: CPT | Performed by: PHYSICIAN ASSISTANT

## 2022-08-15 PROCEDURE — 83036 HEMOGLOBIN GLYCOSYLATED A1C: CPT | Performed by: PHYSICIAN ASSISTANT

## 2022-08-15 PROCEDURE — 3044F HG A1C LEVEL LT 7.0%: CPT | Performed by: PHYSICIAN ASSISTANT

## 2022-08-15 RX ORDER — AMLODIPINE BESYLATE 5 MG/1
5 TABLET ORAL DAILY
Qty: 90 TABLET | Refills: 1 | Status: SHIPPED | OUTPATIENT
Start: 2022-08-15 | End: 2023-03-17 | Stop reason: SDUPTHER

## 2022-08-15 RX ORDER — HYDROCHLOROTHIAZIDE 25 MG/1
25 TABLET ORAL DAILY
Qty: 90 TABLET | Refills: 1 | Status: SHIPPED | OUTPATIENT
Start: 2022-08-15 | End: 2023-03-17 | Stop reason: SDUPTHER

## 2022-08-15 RX ORDER — METFORMIN HYDROCHLORIDE 500 MG/1
TABLET, EXTENDED RELEASE ORAL
Qty: 360 TABLET | Refills: 1 | Status: SHIPPED | OUTPATIENT
Start: 2022-08-15 | End: 2023-01-19 | Stop reason: SDUPTHER

## 2022-08-15 NOTE — PROGRESS NOTES
Chief Complaint   Patient presents with   • Diabetes   • Hyperlipidemia   • Hypertension     Follow Up       Subjective     Mariaelena Easley is a 77 y.o. female.        History of Present Illness     Pt feels like she has been eating more than she used to because she has been at home more than usual.     She is planning to go get her 4th Covid booster. Also interested in getting pneumonia vaccine.    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 Hurts deep in the base of her left thumb. She used to take supplements that helped but stopped them d/t diarrhea.    Still has intermittent back pain. Hurts into the top of her right leg. Usually sleeps on her left side but that is becoming uncomfortable now. Her low back started hurting again after doing some moving of boxes.       Current Outpatient Medications:   •  amLODIPine (NORVASC) 5 MG tablet, Take 1 tablet by mouth Daily., Disp: 90 tablet, Rfl: 1  •  Ascorbic Acid (NEVILLE-C PO), Take 1,000 mg by mouth Daily., Disp: , Rfl:   •  atorvastatin (LIPITOR) 80 MG tablet, TAKE 1 TABLET DAILY, Disp: 90 tablet, Rfl: 3  •  Biotin w/ Vitamins C & E (HAIR SKIN & NAILS GUMMIES PO), Take 2 tablets by mouth Daily., Disp: , Rfl:   •  Calcium 500-100 MG-UNIT chewable tablet, Chew 1 tablet Daily., Disp: , Rfl:   •  Coenzyme Q10 30 MG/5ML liquid, Take 5 mL by mouth Daily., Disp: , Rfl:   •  Fexofenadine HCl (ALLERGY 24-HR PO), Take  by mouth., Disp: , Rfl:   •  glucose blood (OneTouch Verio) test strip, USE TO TEST 2-3 TIMES DAILY, Disp: 300 each, Rfl: 3  •  hydroCHLOROthiazide (HYDRODIURIL) 25 MG tablet, Take 1 tablet by mouth Daily., Disp: 90 tablet, Rfl: 1  •  Lancets (OneTouch Delica Plus [id number]) misc, USE AND DISCARD LANCET TO  CHECK BLOOD SUGAR TWO TIMESA DAY, Disp: 200 each, Rfl: 1  •  metFORMIN ER (GLUCOPHAGE-XR) 500 MG 24 hr tablet, Take 2 po in the am, 2 po in the pm, Disp: 360 tablet, Rfl: 1  •  rizatriptan MLT (MAXALT-MLT) 10 MG disintegrating tablet, Take 1  tablet by mouth 1 (One) Time As Needed for Migraine for up to 1 dose. May repeat in 2 hours if needed, Disp: 9 tablet, Rfl: 5  •  VITAMIN E PO, Take 400 Units by mouth Daily., Disp: , Rfl:   •  Vitamins A & D (VITAMIN A & D PO), Take 2 tablets by mouth Daily., Disp: , Rfl:      PMFSH  The following portions of the patient's history were reviewed and updated as appropriate: allergies, current medications, past family history, past medical history, past social history, past surgical history and problem list.    Review of Systems   Constitutional: Negative for activity change, appetite change and fatigue.   HENT: Negative for congestion and rhinorrhea.    Respiratory: Negative for chest tightness and shortness of breath.    Cardiovascular: Negative for chest pain and palpitations.   Gastrointestinal: Negative for abdominal pain.   Genitourinary: Negative for dysuria.   Musculoskeletal: Positive for back pain. Negative for arthralgias and myalgias.   Neurological: Negative for dizziness, weakness, light-headedness and headaches.   Psychiatric/Behavioral: Negative for dysphoric mood. The patient is not nervous/anxious.        Objective   /90   Pulse 69   Temp 99 °F (37.2 °C)   Wt 71.7 kg (158 lb)   LMP  (LMP Unknown)   SpO2 95%   BMI 27.12 kg/m²     Physical Exam  Vitals and nursing note reviewed.   Constitutional:       Appearance: She is well-developed.   HENT:      Head: Normocephalic.      Right Ear: Hearing, tympanic membrane, ear canal and external ear normal.      Left Ear: Hearing, tympanic membrane, ear canal and external ear normal.      Nose: Nose normal.   Eyes:      Conjunctiva/sclera: Conjunctivae normal.      Pupils: Pupils are equal, round, and reactive to light.   Cardiovascular:      Rate and Rhythm: Normal rate and regular rhythm.      Heart sounds: Normal heart sounds.   Pulmonary:      Effort: Pulmonary effort is normal.      Breath sounds: Normal breath sounds. No decreased breath  sounds, wheezing, rhonchi or rales.   Musculoskeletal:         General: Normal range of motion.      Cervical back: Normal range of motion.   Skin:     General: Skin is warm and dry.   Neurological:      Mental Status: She is alert.   Psychiatric:         Behavior: Behavior normal.         Results for orders placed or performed in visit on 08/15/22   POC Glucose    Specimen: Blood   Result Value Ref Range    Glucose 111 70 - 130 mg/dL   POC Glycosylated Hemoglobin (Hb A1C)    Specimen: Blood   Result Value Ref Range    Hemoglobin A1C 6.5 %    Lot Number [id number]     Expiration Date 04/03/2024         ASSESSMENT/PLAN    Diagnoses and all orders for this visit:    1. Type 2 diabetes mellitus with hyperglycemia, without long-term current use of insulin (HCC) (Primary)  Assessment & Plan:  Diabetes is unchanged.   Continue current treatment regimen.  Reminded to bring in blood sugar diary at next visit.  Dietary recommendations for ADA diet.  Regular aerobic exercise.  Diabetes will be reassessed in 3 months.    Orders:  -     POC Glucose  -     POC Glycosylated Hemoglobin (Hb A1C)    2. Type 2 diabetes mellitus without complication, without long-term current use of insulin (HCC)  Assessment & Plan:  Diabetes is unchanged.   Continue current treatment regimen.  Reminded to bring in blood sugar diary at next visit.  Dietary recommendations for ADA diet.  Regular aerobic exercise.  Diabetes will be reassessed in 3 months.    Orders:  -     metFORMIN ER (GLUCOPHAGE-XR) 500 MG 24 hr tablet; Take 2 po in the am, 2 po in the pm  Dispense: 360 tablet; Refill: 1    3. Essential hypertension  Assessment & Plan:  Hypertension is unchanged.  Continue current treatment regimen.  Dietary sodium restriction.  Weight loss.  Regular aerobic exercise.  Continue current medications.  Ambulatory blood pressure monitoring.  Blood pressure will be reassessed at the next regular appointment.    Orders:  -     hydroCHLOROthiazide  (HYDRODIURIL) 25 MG tablet; Take 1 tablet by mouth Daily.  Dispense: 90 tablet; Refill: 1  -     amLODIPine (NORVASC) 5 MG tablet; Take 1 tablet by mouth Daily.  Dispense: 90 tablet; Refill: 1    4. Chronic bilateral low back pain with right-sided sciatica  Comments:  Refer to PT for eval and treatment.  Orders:  -     Ambulatory Referral to Physical Therapy Evaluate and treat    5. Post-menopausal  -     DEXA Bone Density Axial; Future           Return for Next scheduled follow up.

## 2022-10-10 RX ORDER — BLOOD SUGAR DIAGNOSTIC
STRIP MISCELLANEOUS
Qty: 300 EACH | Refills: 3 | Status: SHIPPED | OUTPATIENT
Start: 2022-10-10

## 2022-10-11 ENCOUNTER — HOSPITAL ENCOUNTER (OUTPATIENT)
Dept: BONE DENSITY | Facility: HOSPITAL | Age: 77
Discharge: HOME | End: 2022-10-11
Admitting: PHYSICIAN ASSISTANT

## 2022-10-11 DIAGNOSIS — Z78.0 POST-MENOPAUSAL: ICD-10-CM

## 2022-10-11 PROCEDURE — 77080 DXA BONE DENSITY AXIAL: CPT

## 2022-10-17 NOTE — PROGRESS NOTES
Dear MsSteven Kaushal,    Thank you for obtaining your DEXA (bone density) scan. I have received those results and would like to review them with you.      Your bone density remains in the osteopenia range. Osteopenia is between normal and osteoporosis. The values indicate that your risk of a major fracture in the next 10 years is 18%. If this risk level should increase to 20%, we will need to start you on osteoporosis treatment.    Please maintain regular calcium and vitamin D supplementation. Calcium intake should be 1000mg per day between diet and supplements. Weight-bearing exercise is good for bone health as well.    We should plan to repeat your DEXA in 2 years. Please let me know if you have any questions or concerns regarding these results.        Sincerely,  BISHNU Bell

## 2022-10-27 ENCOUNTER — TELEPHONE (OUTPATIENT)
Dept: INTERNAL MEDICINE | Facility: CLINIC | Age: 77
End: 2022-10-27

## 2022-10-27 DIAGNOSIS — G43.909 MIGRAINE WITHOUT STATUS MIGRAINOSUS, NOT INTRACTABLE, UNSPECIFIED MIGRAINE TYPE: ICD-10-CM

## 2022-10-27 RX ORDER — RIZATRIPTAN BENZOATE 10 MG/1
10 TABLET, ORALLY DISINTEGRATING ORAL ONCE AS NEEDED
Qty: 9 TABLET | Refills: 1 | Status: SHIPPED | OUTPATIENT
Start: 2022-10-27

## 2022-10-27 NOTE — TELEPHONE ENCOUNTER
"PT CALLED WANTING TO LET BOBBY LEVI KNOW SHE HAD A \"SILENT MIGRAINE\" AND THAT SHE'S BEEN VERY STRESSED LATELY, AND THAT SHE HAD A PRESCRIPTION THAT BOBBY HAD PREVIOUSLY PRESCRIBED FOR HER THAT YOU PUT UNDER YOUR TONGUE, AND IT IS OUT OF DATE, AND NO MORE REFILLS.     SHE STATED THE MAIN MED SHE WOULD LIKE TO COMBAT THIS IS RIZATRIPTAN MLT 10 MG TABLETS. SHE STATED SHE HAS VERTIGO, DIARRHEA, AND THROWING UP.     HER LAST OV WAS 08/15/2022.     PREFERS [facility] IN [place], [place].    PLEASE ADVISE.   "

## 2022-10-31 ENCOUNTER — CLINICAL SUPPORT (OUTPATIENT)
Dept: FAMILY MEDICINE CLINIC | Facility: CLINIC | Age: 77
End: 2022-10-31

## 2022-10-31 DIAGNOSIS — Z23 NEED FOR VIRAL IMMUNIZATION: Primary | ICD-10-CM

## 2022-10-31 PROCEDURE — G0009 ADMIN PNEUMOCOCCAL VACCINE: HCPCS | Performed by: FAMILY MEDICINE

## 2022-10-31 PROCEDURE — G0008 ADMIN INFLUENZA VIRUS VAC: HCPCS | Performed by: FAMILY MEDICINE

## 2022-10-31 PROCEDURE — 90677 PCV20 VACCINE IM: CPT | Performed by: FAMILY MEDICINE

## 2022-10-31 PROCEDURE — 90662 IIV NO PRSV INCREASED AG IM: CPT | Performed by: FAMILY MEDICINE

## 2022-10-31 NOTE — PROGRESS NOTES
Mariaelena Easley presents to [facility] primary care for Influenza & Pneumococcal 20 conjugate Vaccine(s) today.  Patient has vitals today of There were no vitals filed for this visit.     Flu consent was reviewed with  and consent was signed by Mariaelena Easley.     Adult Vaccine Consent was reviewed and consent was signed by her as well.    Patient tolerated vaccination well with no immediate signs or symptoms of allergic reaction or intolerance.        Iwona Dyer MA

## 2022-12-05 ENCOUNTER — OFFICE VISIT (OUTPATIENT)
Dept: CARDIOLOGY | Facility: CLINIC | Age: 77
End: 2022-12-05

## 2022-12-05 VITALS
SYSTOLIC BLOOD PRESSURE: 116 MMHG | HEIGHT: 64 IN | DIASTOLIC BLOOD PRESSURE: 68 MMHG | OXYGEN SATURATION: 97 % | BODY MASS INDEX: 27.18 KG/M2 | WEIGHT: 159.2 LBS | HEART RATE: 90 BPM

## 2022-12-05 DIAGNOSIS — I10 ESSENTIAL HYPERTENSION: ICD-10-CM

## 2022-12-05 DIAGNOSIS — E78.5 HYPERLIPIDEMIA, UNSPECIFIED HYPERLIPIDEMIA TYPE: ICD-10-CM

## 2022-12-05 DIAGNOSIS — R07.9 CHEST PAIN, UNSPECIFIED TYPE: Primary | ICD-10-CM

## 2022-12-05 PROCEDURE — 93000 ELECTROCARDIOGRAM COMPLETE: CPT | Performed by: INTERNAL MEDICINE

## 2022-12-05 PROCEDURE — 99213 OFFICE O/P EST LOW 20 MIN: CPT | Performed by: INTERNAL MEDICINE

## 2022-12-05 NOTE — PROGRESS NOTES
[facility]  Subjective:     Encounter Date: 2022      Patient ID: Mariaelena Easley is a 77 y.o. female.    Chief Complaint: Coronary Artery Disease, Hypertension, Chest Pain, and Shortness of Breath      PROBLEM LIST:  1. Chest pain  a. MPS, 2018: EF of 66% very small sized, mildly severe area of ischemia in the lateral wall.  Low risk study.   b. Stress MPS, 2020: Myocardial perfusion imaging indicates a small sized infarct located in the lateral wall with no significant ischemia noted. EF 57%.   c. US nonvascular extremity, 10/08/2020: 2.5 cm lipoma in the area of the palpable abnormality.   2. Hypertension  3. Dyslipidemia  4. Borderline diabetes  5. Eczema  6. Surgeries:  a. Bilateral breast augmentation  b. Right breast biopsy  c.       History of Present Illness  Mariaelena Easley returns today for a follow up with a history of chest pain and cardiac risk factors. Since last visit, patient has been doing well overall from a cardiovascular standpoint, however, she has silent migraines with associated vertigo, vomiting, and diarrhea. She states that she has poor quality of sleep and she is under immense stress due to her [employer]. She has a quick dull chest pain on both sides of her chest on occasion. Patient feels short of breath when walking up hills, but does not experience chest pain during these times. Patient denies orthopnea, palpitations, edema, dizziness, and syncope.     Allergies   Allergen Reactions   • Codeine Nausea Only and Dizziness   • Sulfa Antibiotics Rash         Current Outpatient Medications:   •  amLODIPine (NORVASC) 5 MG tablet, Take 1 tablet by mouth Daily., Disp: 90 tablet, Rfl: 1  •  Ascorbic Acid (NEVILLE-C PO), Take 1,000 mg by mouth Daily., Disp: , Rfl:   •  atorvastatin (LIPITOR) 80 MG tablet, TAKE 1 TABLET DAILY, Disp: 90 tablet, Rfl: 3  •  Calcium 500-100 MG-UNIT chewable tablet, Chew 1 tablet Daily.,  "Disp: , Rfl:   •  Coenzyme Q10 30 MG/5ML liquid, Take 5 mL by mouth Daily., Disp: , Rfl:   •  hydroCHLOROthiazide (HYDRODIURIL) 25 MG tablet, Take 1 tablet by mouth Daily., Disp: 90 tablet, Rfl: 1  •  Lancets (OneTouch Delica Plus [id number]) misc, USE AND DISCARD LANCET TO  CHECK BLOOD SUGAR TWO TIMESA DAY, Disp: 200 each, Rfl: 1  •  metFORMIN ER (GLUCOPHAGE-XR) 500 MG 24 hr tablet, Take 2 po in the am, 2 po in the pm, Disp: 360 tablet, Rfl: 1  •  OneTouch Verio test strip, USE TO TEST 2-3 TIMES DAILY, Disp: 300 each, Rfl: 3  •  rizatriptan MLT (Maxalt-MLT) 10 MG disintegrating tablet, Place 1 tablet on the tongue 1 (One) Time As Needed for Migraine for up to 1 dose. May repeat in 2 hours if needed, Disp: 9 tablet, Rfl: 1  •  VITAMIN E PO, Take 400 Units by mouth Daily., Disp: , Rfl:   •  Vitamins A & D (VITAMIN A & D PO), Take 2 tablets by mouth Daily., Disp: , Rfl:     The following portions of the patient's history were reviewed and updated as appropriate: allergies, current medications, past family history, past medical history, past social history, past surgical history and problem list.    Review of Systems   Constitutional: Negative.   Cardiovascular: Positive for chest pain. Negative for dyspnea on exertion, leg swelling, palpitations and syncope.   Respiratory: Positive for shortness of breath.    Hematologic/Lymphatic: Negative for bleeding problem. Does not bruise/bleed easily.   Skin: Negative for rash.   Musculoskeletal: Negative for muscle weakness and myalgias.   Gastrointestinal: Negative for heartburn, nausea and vomiting.   Neurological: Negative for dizziness, light-headedness, loss of balance and numbness.          Objective:     Vitals:    12/05/22 1442   BP: 116/68   BP Location: Right arm   Patient Position: Sitting   Pulse: 90   SpO2: 97%   Weight: 72.2 kg (159 lb 3.2 oz)   Height: 162.6 cm (64\")         Constitutional:       Appearance: Well-developed.   Neck:      Thyroid: No thyromegaly. "      Vascular: No carotid bruit or JVD.   Pulmonary:      Breath sounds: Normal breath sounds.   Cardiovascular:      Regular rhythm.      No gallop. No S3 and S4 gallop.   Edema:     Peripheral edema absent.   Abdominal:      General: Bowel sounds are normal.      Palpations: Abdomen is soft. There is no abdominal mass.      Tenderness: There is no abdominal tenderness.   Skin:     General: Skin is warm and dry.      Findings: No rash.   Neurological:      Mental Status: Alert and oriented to person, place, and time.         Lab Review:  Lab Results   Component Value Date    GLUCOSE 101 (H) 04/13/2022    BUN 14 04/13/2022    CREATININE 0.80 04/13/2022    BCR 17.5 04/13/2022     04/13/2022    K 4.2 04/13/2022    CO2 27.8 04/13/2022    CALCIUM 10.2 04/13/2022    ALBUMIN 4.50 04/13/2022    ALKPHOS 80 04/13/2022    AST 31 04/13/2022    ALT 28 04/13/2022     Lab Results   Component Value Date    CHOL 172 12/15/2021    TRIG 71 12/15/2021    HDL 62 (H) 12/15/2021    LDL 97 12/15/2021      Lab Results   Component Value Date    WBC 10.58 12/15/2021    RBC 4.55 12/15/2021    HGB 12.8 12/15/2021    HCT 39.7 12/15/2021    MCV 87.3 12/15/2021     12/15/2021     Lab Results   Component Value Date    TSH 1.070 12/15/2021     Lab Results   Component Value Date    HGBA1C 6.5 08/15/2022          ECG 12 Lead    Date/Time: 12/5/2022 2:55 PM  Performed by: Raymond Mcdaniel MD  Authorized by: Raymond Mcdaniel MD   Comparison: compared with previous ECG from 3/26/2018  Comparison to previous ECG: Now left anterior fascicular block  Rhythm: sinus rhythm  BPM: 90    Clinical impression: abnormal EKG  Comments: Left anterior fascicular block                Assessment:   Diagnoses and all orders for this visit:    1. Chest pain, unspecified type  (Primary)    2. Essential hypertension    3. Hyperlipidemia, unspecified hyperlipidemia type    Other orders  -     ECG 12 Lead        Impression:  1.  Coronary artery disease, with  minimally abnormal stress perfusion study 2022.  No exertional angina.. Stable with occasional episodes probably due to stress.   2. Essential hypertension. Well controlled. Continue on amlodipine 5 mg daily and hydrochlorothiazide 25 mg daily for hypertension.   3. Hyperlipidemia. Well controlled. LDL 97 on 12/15/2021. Continue on atorvastatin 80 mg daily for hyperlipidemia.     Plan:  1. Stable cardiac status.   2. No change in functional ability or angina, will continue medical therapy.  3. Continue current medications.  4. Revisit in 12 MO, or sooner as needed.    Scribed for Raymond Mcdaniel MD by Salma Cardona. 12/5/2022  14:59 EST    Raymond Mcdaniel MD      Please note that portions of this note may have been completed with a voice recognition program. Efforts were made to edit the dictations, but occasionally words are mistranscribed.

## 2022-12-19 ENCOUNTER — OFFICE VISIT (OUTPATIENT)
Dept: INTERNAL MEDICINE | Facility: CLINIC | Age: 77
End: 2022-12-19

## 2022-12-19 VITALS
OXYGEN SATURATION: 98 % | WEIGHT: 160.8 LBS | DIASTOLIC BLOOD PRESSURE: 76 MMHG | SYSTOLIC BLOOD PRESSURE: 118 MMHG | HEART RATE: 83 BPM | TEMPERATURE: 98.1 F | BODY MASS INDEX: 27.6 KG/M2

## 2022-12-19 DIAGNOSIS — E11.65 TYPE 2 DIABETES MELLITUS WITH HYPERGLYCEMIA, WITHOUT LONG-TERM CURRENT USE OF INSULIN: ICD-10-CM

## 2022-12-19 DIAGNOSIS — Z00.00 ENCOUNTER FOR MEDICARE ANNUAL WELLNESS EXAM: Primary | ICD-10-CM

## 2022-12-19 DIAGNOSIS — D64.9 ANEMIA, UNSPECIFIED TYPE: ICD-10-CM

## 2022-12-19 DIAGNOSIS — E07.9 THYROID DISEASE: ICD-10-CM

## 2022-12-19 DIAGNOSIS — Z12.31 ENCOUNTER FOR SCREENING MAMMOGRAM FOR MALIGNANT NEOPLASM OF BREAST: ICD-10-CM

## 2022-12-19 LAB
EXPIRATION DATE: NORMAL
GLUCOSE BLDC GLUCOMTR-MCNC: 141 MG/DL (ref 70–130)
HBA1C MFR BLD: 7 %
Lab: NORMAL

## 2022-12-19 PROCEDURE — G0439 PPPS, SUBSEQ VISIT: HCPCS | Performed by: PHYSICIAN ASSISTANT

## 2022-12-19 PROCEDURE — 1126F AMNT PAIN NOTED NONE PRSNT: CPT | Performed by: PHYSICIAN ASSISTANT

## 2022-12-19 PROCEDURE — 83036 HEMOGLOBIN GLYCOSYLATED A1C: CPT | Performed by: PHYSICIAN ASSISTANT

## 2022-12-19 PROCEDURE — 1170F FXNL STATUS ASSESSED: CPT | Performed by: PHYSICIAN ASSISTANT

## 2022-12-19 PROCEDURE — 82962 GLUCOSE BLOOD TEST: CPT | Performed by: PHYSICIAN ASSISTANT

## 2022-12-19 PROCEDURE — 3051F HG A1C>EQUAL 7.0%<8.0%: CPT | Performed by: PHYSICIAN ASSISTANT

## 2022-12-19 PROCEDURE — 1159F MED LIST DOCD IN RCRD: CPT | Performed by: PHYSICIAN ASSISTANT

## 2022-12-19 NOTE — PROGRESS NOTES
The ABCs of the Annual Wellness Visit  Subsequent Medicare Wellness Visit    Subjective    Mariaelena Easley is a 77 y.o. female who presents for a Subsequent Medicare Wellness Visit.    The following portions of the patient's history were reviewed and   updated as appropriate: allergies, current medications, past family history, past medical history, past social history, past surgical history and problem list.    Compared to one year ago, the patient feels her physical   health is the same.    Compared to one year ago, the patient feels her mental   health is worse. Still having stress with [employer] lawsuits.    Recent Hospitalizations:  She was not admitted to the hospital during the last year.       Current Medical Providers:  Patient Care Team:  Nuris Cristobal PA as PCP - General (Internal Medicine)    Outpatient Medications Prior to Visit   Medication Sig Dispense Refill   • amLODIPine (NORVASC) 5 MG tablet Take 1 tablet by mouth Daily. 90 tablet 1   • Ascorbic Acid (NEVILLE-C PO) Take 1,000 mg by mouth Daily.     • atorvastatin (LIPITOR) 80 MG tablet TAKE 1 TABLET DAILY 90 tablet 3   • Calcium 500-100 MG-UNIT chewable tablet Chew 1 tablet Daily.     • Coenzyme Q10 30 MG/5ML liquid Take 5 mL by mouth Daily.     • hydroCHLOROthiazide (HYDRODIURIL) 25 MG tablet Take 1 tablet by mouth Daily. 90 tablet 1   • Lancets (OneTouch Delica Plus [id number]) misc USE AND DISCARD LANCET TO  CHECK BLOOD SUGAR TWO TIMESA  each 1   • metFORMIN ER (GLUCOPHAGE-XR) 500 MG 24 hr tablet Take 2 po in the am, 2 po in the pm 360 tablet 1   • OneTouch Verio test strip USE TO TEST 2-3 TIMES DAILY 300 each 3   • rizatriptan MLT (Maxalt-MLT) 10 MG disintegrating tablet Place 1 tablet on the tongue 1 (One) Time As Needed for Migraine for up to 1 dose. May repeat in 2 hours if needed 9 tablet 1   • VITAMIN E PO Take 400 Units by mouth Daily.     • Vitamins A & D (VITAMIN A & D PO) Take 2 tablets by mouth Daily.       No facility-administered  "medications prior to visit.       No opioid medication identified on active medication list. I have reviewed chart for other potential  high risk medication/s and harmful drug interactions in the elderly.          Aspirin is not on active medication list.  Aspirin use is not indicated based on review of current medical condition/s. Risk of harm outweighs potential benefits.  .    Patient Active Problem List   Diagnosis   • Anemia   • Hyperlipidemia   • Migraine headache   • Thyroid disease   • Episodic recurrent vertigo   • Osteopenia   • Seborrheic eczema   • Cyst of uterus   • Cyst of breast   • Essential hypertension   • Diabetes mellitus (HCC)   • Squamous cell skin cancer, face   • Chest pain     Advance Care Planning  Advance Directive is not on file.  ACP discussion was held with the patient during this visit. Patient has an advance directive (not in EMR), copy requested.     Objective    Vitals:    22 1302   BP: 118/76   Pulse: 83   Temp: 98.1 °F (36.7 °C)   SpO2: 98%   Weight: 72.9 kg (160 lb 12.8 oz)   PainSc: 0-No pain     Estimated body mass index is 27.6 kg/m² as calculated from the following:    Height as of 22: 162.6 cm (64\").    Weight as of this encounter: 72.9 kg (160 lb 12.8 oz).    BMI is >= 25 and <30. (Overweight) The following options were offered after discussion;: exercise counseling/recommendations and nutrition counseling/recommendations      Does the patient have evidence of cognitive impairment? No    Lab Results   Component Value Date    HGBA1C 7.0 2022        HEALTH RISK ASSESSMENT    Smoking Status:  Social History     Tobacco Use   Smoking Status Former   • Packs/day: 1.00   • Years: 15.00   • Pack years: 15.00   • Types: Cigarettes   • Quit date: 12/15/1995   • Years since quittin.0   Smokeless Tobacco Never     Alcohol Consumption:  Social History     Substance and Sexual Activity   Alcohol Use Yes    Comment: occas     Fall Risk Screen:    STEADI Fall Risk " Assessment was completed, and patient is at LOW risk for falls.Assessment completed on:12/19/2022    Depression Screening:  PHQ-2/PHQ-9 Depression Screening 12/19/2022   Retired PHQ-9 Total Score -   Retired Total Score -   Little Interest or Pleasure in Doing Things 0-->not at all   Feeling Down, Depressed or Hopeless 0-->not at all   PHQ-9: Brief Depression Severity Measure Score 0       Health Habits and Functional and Cognitive Screening:  Functional & Cognitive Status 12/19/2022   Do you have difficulty preparing food and eating? No   Do you have difficulty bathing yourself, getting dressed or grooming yourself? No   Do you have difficulty using the toilet? No   Do you have difficulty moving around from place to place? No   Do you have trouble with steps or getting out of a bed or a chair? No   Current Diet Well Balanced Diet   Dental Exam Not up to date   Eye Exam Up to date   Exercise (times per week) 0 times per week   Current Exercises Include No Regular Exercise   Current Exercise Activities Include -   Do you need help using the phone?  No   Are you deaf or do you have serious difficulty hearing?  No   Do you need help with transportation? No   Do you need help shopping? No   Do you need help preparing meals?  No   Do you need help with housework?  No   Do you need help with laundry? No   Do you need help taking your medications? No   Do you need help managing money? No   Do you ever drive or ride in a car without wearing a seat belt? No   Have you felt unusual stress, anger or loneliness in the last month? No   Who do you live with? Spouse   If you need help, do you have trouble finding someone available to you? No   Have you been bothered in the last four weeks by sexual problems? No   Do you have difficulty concentrating, remembering or making decisions? No       Age-appropriate Screening Schedule:  Refer to the list below for future screening recommendations based on patient's age, sex and/or medical  "conditions. Orders for these recommended tests are listed in the plan section. The patient has been provided with a written plan.    Health Maintenance   Topic Date Due   • DIABETIC EYE EXAM  08/26/2021   • MAMMOGRAM  10/08/2022   • LIPID PANEL  12/15/2022   • URINE MICROALBUMIN  12/21/2022   • ZOSTER VACCINE (2 of 2) 12/19/2023 (Originally 2/26/2012)   • HEMOGLOBIN A1C  06/19/2023   • DIABETIC FOOT EXAM  12/19/2023   • TDAP/TD VACCINES (3 - Td or Tdap) 06/01/2024   • DXA SCAN  10/11/2024   • INFLUENZA VACCINE  Completed                CMS Preventative Services Quick Reference  Risk Factors Identified During Encounter  Immunizations Discussed/Encouraged: Shingrix and COVID19  Inactivity/Sedentary: Patient was advised to exercise at least 150 minutes a week per CDC recommendations.  The above risks/problems have been discussed with the patient.  Pertinent information has been shared with the patient in the After Visit Summary.  An After Visit Summary and PPPS were made available to the patient.    Follow Up:   Next Medicare Wellness visit to be scheduled in 1 year.       Additional E&M Note during same encounter follows:  Patient has multiple medical problems which are significant and separately identifiable that require additional work above and beyond the Medicare Wellness Visit.      Chief Complaint  Medicare Wellness-subsequent, Diabetes, Hyperlipidemia, and Hypertension    Subjective      HPI  Mariaelena Easley is also being seen today for the following.    Recently saw her cardiologist, Dr. Mcdaniel. Dr. Mcdaniel advised her EKG was okay, but when she looked on MyChart, it says her EKG was irregular. She has normal sinus rhythm and left anterior fascicular block.     Patient has been under a lot of stress lately, but hopes this will resolve soon.    The patient has \"typical old age sleeping.\" If she goes to bed early, she will sleep for 4 hours and then gets up to go to the bathroom. If she goes to sleep at midnight, " she will again wake up 4 hours later. She does not go to bed at the same time every night, but is aware this would help. Marvin occasionally go back to sleep. If she cannot go back to sleep, she will read or do a crossword.     Most recent eye exam [email or web address]. Has slow growing cataracts, but they are not severe enough to be treated.    Last mammogram was 2 years ago. She is part of the [place] ovarian cancer screening and recently completed this in 09/2022. Will call in 02/2023 to get another appointment. Completes ovarian cancer screening yearly.    Patient's feet are dry. Has a toenail that recently split.     She would like to get her ears checked for wax. Has not noticed any hearing loss.  Review of Systems   Constitutional: Negative for activity change, appetite change and fatigue.   HENT: Negative for congestion.    Respiratory: Negative for chest tightness, shortness of breath and wheezing.    Cardiovascular: Negative for chest pain and palpitations.   Gastrointestinal: Negative for abdominal pain.   Genitourinary: Negative for dysuria.   Neurological: Negative for dizziness, syncope, weakness and light-headedness.       Objective   Vital Signs:  /76   Pulse 83   Temp 98.1 °F (36.7 °C)   Wt 72.9 kg (160 lb 12.8 oz)   SpO2 98%   BMI 27.60 kg/m²     Physical Exam  Vitals and nursing note reviewed.   Constitutional:       Appearance: She is well-developed.   HENT:      Head: Normocephalic.      Right Ear: Hearing, tympanic membrane, ear canal and external ear normal.      Left Ear: Hearing, tympanic membrane, ear canal and external ear normal.      Nose: Nose normal.   Eyes:      Conjunctiva/sclera: Conjunctivae normal.      Pupils: Pupils are equal, round, and reactive to light.   Cardiovascular:      Rate and Rhythm: Normal rate and regular rhythm.      Heart sounds: Normal heart sounds.   Pulmonary:      Effort: Pulmonary effort is normal.      Breath sounds: Normal breath  sounds. No decreased breath sounds, wheezing, rhonchi or rales.   Musculoskeletal:         General: Normal range of motion.      Cervical back: Normal range of motion.   Skin:     General: Skin is warm and dry.   Neurological:      Mental Status: She is alert.   Psychiatric:         Behavior: Behavior normal.                       Assessment and Plan Diagnoses and all orders for this visit:    1. Encounter for Medicare annual wellness exam (Primary)    2. Type 2 diabetes mellitus with hyperglycemia, without long-term current use of insulin (HCC)  Assessment & Plan:  Diabetes is worsening.   Continue current treatment regimen.  Reminded to bring in blood sugar diary at next visit.  Dietary recommendations for ADA diet.  Regular aerobic exercise.  Diabetes will be reassessed in 3 months.    Orders:  -     POC Glucose  -     POC Glycosylated Hemoglobin (Hb A1C)  -     Comprehensive Metabolic Panel; Future  -     Lipid Panel; Future  -     TSH; Future    3. Encounter for screening mammogram for malignant neoplasm of breast  -     Mammo Screening Digital Tomosynthesis Bilateral With CAD; Future    4. Thyroid disease  Assessment & Plan:  Check TSH. Further recommendations based on results.        5. Anemia, unspecified type  -     CBC & Differential; Future           Follow Up  Return in about 3 months (around 3/19/2023) for Follow up, Medicare Wellness in 1 year.  Patient was given instructions and counseling regarding her condition or for health maintenance advice. Please see specific information pulled into the AVS if appropriate.     Transcribed from ambient dictation for BISHNU Bell by Chioma Broderick.  12/19/22   14:55 EST    Patient or patient representative verbalized consent to the visit recording.  I have personally performed the services described in this document as transcribed by the above individual, and it is both accurate and complete.

## 2022-12-28 ENCOUNTER — LAB (OUTPATIENT)
Dept: FAMILY MEDICINE CLINIC | Facility: CLINIC | Age: 77
End: 2022-12-28
Payer: MEDICARE

## 2022-12-28 DIAGNOSIS — E11.65 TYPE 2 DIABETES MELLITUS WITH HYPERGLYCEMIA, WITHOUT LONG-TERM CURRENT USE OF INSULIN: ICD-10-CM

## 2022-12-28 DIAGNOSIS — D64.9 ANEMIA, UNSPECIFIED TYPE: ICD-10-CM

## 2022-12-28 LAB
ALBUMIN SERPL-MCNC: 4.2 G/DL (ref 3.5–5.2)
ALBUMIN/GLOB SERPL: 1.3 G/DL
ALP SERPL-CCNC: 82 U/L (ref 39–117)
ALT SERPL W P-5'-P-CCNC: 16 U/L (ref 1–33)
ANION GAP SERPL CALCULATED.3IONS-SCNC: 11 MMOL/L (ref 5–15)
AST SERPL-CCNC: 16 U/L (ref 1–32)
BASOPHILS # BLD AUTO: 0.03 10*3/MM3 (ref 0–0.2)
BASOPHILS NFR BLD AUTO: 0.3 % (ref 0–1.5)
BILIRUB SERPL-MCNC: 0.6 MG/DL (ref 0–1.2)
BUN SERPL-MCNC: 26 MG/DL (ref 8–23)
BUN/CREAT SERPL: 39.4 (ref 7–25)
CALCIUM SPEC-SCNC: 10 MG/DL (ref 8.6–10.5)
CHLORIDE SERPL-SCNC: 100 MMOL/L (ref 98–107)
CHOLEST SERPL-MCNC: 163 MG/DL (ref 0–200)
CO2 SERPL-SCNC: 31 MMOL/L (ref 22–29)
CREAT SERPL-MCNC: 0.66 MG/DL (ref 0.57–1)
DEPRECATED RDW RBC AUTO: 42.8 FL (ref 37–54)
EGFRCR SERPLBLD CKD-EPI 2021: 91.6 ML/MIN/1.73
EOSINOPHIL # BLD AUTO: 0.16 10*3/MM3 (ref 0–0.4)
EOSINOPHIL NFR BLD AUTO: 1.8 % (ref 0.3–6.2)
ERYTHROCYTE [DISTWIDTH] IN BLOOD BY AUTOMATED COUNT: 13 % (ref 12.3–15.4)
GLOBULIN UR ELPH-MCNC: 3.2 GM/DL
GLUCOSE SERPL-MCNC: 145 MG/DL (ref 65–99)
HCT VFR BLD AUTO: 40.7 % (ref 34–46.6)
HDLC SERPL-MCNC: 58 MG/DL (ref 40–60)
HGB BLD-MCNC: 13.2 G/DL (ref 12–15.9)
IMM GRANULOCYTES # BLD AUTO: 0.02 10*3/MM3 (ref 0–0.05)
IMM GRANULOCYTES NFR BLD AUTO: 0.2 % (ref 0–0.5)
LDLC SERPL CALC-MCNC: 91 MG/DL (ref 0–100)
LDLC/HDLC SERPL: 1.55 {RATIO}
LYMPHOCYTES # BLD AUTO: 1.82 10*3/MM3 (ref 0.7–3.1)
LYMPHOCYTES NFR BLD AUTO: 20.2 % (ref 19.6–45.3)
MCH RBC QN AUTO: 29.3 PG (ref 26.6–33)
MCHC RBC AUTO-ENTMCNC: 32.4 G/DL (ref 31.5–35.7)
MCV RBC AUTO: 90.2 FL (ref 79–97)
MONOCYTES # BLD AUTO: 0.67 10*3/MM3 (ref 0.1–0.9)
MONOCYTES NFR BLD AUTO: 7.4 % (ref 5–12)
NEUTROPHILS NFR BLD AUTO: 6.33 10*3/MM3 (ref 1.7–7)
NEUTROPHILS NFR BLD AUTO: 70.1 % (ref 42.7–76)
NRBC BLD AUTO-RTO: 0 /100 WBC (ref 0–0.2)
PLATELET # BLD AUTO: 316 10*3/MM3 (ref 140–450)
PMV BLD AUTO: 10.3 FL (ref 6–12)
POTASSIUM SERPL-SCNC: 4.5 MMOL/L (ref 3.5–5.2)
PROT SERPL-MCNC: 7.4 G/DL (ref 6–8.5)
RBC # BLD AUTO: 4.51 10*6/MM3 (ref 3.77–5.28)
SODIUM SERPL-SCNC: 142 MMOL/L (ref 136–145)
TRIGL SERPL-MCNC: 75 MG/DL (ref 0–150)
TSH SERPL DL<=0.05 MIU/L-ACNC: 1.29 UIU/ML (ref 0.27–4.2)
VLDLC SERPL-MCNC: 14 MG/DL (ref 5–40)
WBC NRBC COR # BLD: 9.03 10*3/MM3 (ref 3.4–10.8)

## 2022-12-28 PROCEDURE — 85025 COMPLETE CBC W/AUTO DIFF WBC: CPT | Performed by: PHYSICIAN ASSISTANT

## 2022-12-28 PROCEDURE — 84443 ASSAY THYROID STIM HORMONE: CPT | Performed by: PHYSICIAN ASSISTANT

## 2022-12-28 PROCEDURE — 80053 COMPREHEN METABOLIC PANEL: CPT | Performed by: PHYSICIAN ASSISTANT

## 2022-12-28 PROCEDURE — 80061 LIPID PANEL: CPT | Performed by: PHYSICIAN ASSISTANT

## 2023-01-19 DIAGNOSIS — E11.9 TYPE 2 DIABETES MELLITUS WITHOUT COMPLICATION, WITHOUT LONG-TERM CURRENT USE OF INSULIN: ICD-10-CM

## 2023-01-19 RX ORDER — METFORMIN HYDROCHLORIDE 500 MG/1
TABLET, EXTENDED RELEASE ORAL
Qty: 360 TABLET | Refills: 3 | Status: SHIPPED | OUTPATIENT
Start: 2023-01-19

## 2023-01-27 ENCOUNTER — HOSPITAL ENCOUNTER (OUTPATIENT)
Dept: MAMMOGRAPHY | Facility: HOSPITAL | Age: 78
Discharge: HOME | End: 2023-01-27
Admitting: PHYSICIAN ASSISTANT
Payer: MEDICARE

## 2023-01-27 DIAGNOSIS — Z12.31 ENCOUNTER FOR SCREENING MAMMOGRAM FOR MALIGNANT NEOPLASM OF BREAST: ICD-10-CM

## 2023-01-27 PROCEDURE — 77063 BREAST TOMOSYNTHESIS BI: CPT | Performed by: RADIOLOGY

## 2023-01-27 PROCEDURE — 77067 SCR MAMMO BI INCL CAD: CPT

## 2023-01-27 PROCEDURE — 77067 SCR MAMMO BI INCL CAD: CPT | Performed by: RADIOLOGY

## 2023-01-27 PROCEDURE — 77063 BREAST TOMOSYNTHESIS BI: CPT

## 2023-02-20 ENCOUNTER — OFFICE VISIT (OUTPATIENT)
Dept: FAMILY MEDICINE CLINIC | Facility: CLINIC | Age: 78
End: 2023-02-20
Payer: MEDICARE

## 2023-02-20 VITALS
HEIGHT: 64 IN | HEART RATE: 85 BPM | DIASTOLIC BLOOD PRESSURE: 75 MMHG | WEIGHT: 155 LBS | BODY MASS INDEX: 26.46 KG/M2 | OXYGEN SATURATION: 96 % | TEMPERATURE: 97.1 F | SYSTOLIC BLOOD PRESSURE: 130 MMHG | RESPIRATION RATE: 20 BRPM

## 2023-02-20 DIAGNOSIS — R05.1 ACUTE COUGH: Primary | ICD-10-CM

## 2023-02-20 DIAGNOSIS — J40 BRONCHITIS WITH WHEEZING: ICD-10-CM

## 2023-02-20 LAB
EXPIRATION DATE: NORMAL
FLUAV AG UPPER RESP QL IA.RAPID: NOT DETECTED
FLUBV AG UPPER RESP QL IA.RAPID: NOT DETECTED
INTERNAL CONTROL: NORMAL
Lab: NORMAL
SARS-COV-2 AG UPPER RESP QL IA.RAPID: NOT DETECTED

## 2023-02-20 PROCEDURE — 96372 THER/PROPH/DIAG INJ SC/IM: CPT | Performed by: PSYCHOLOGIST

## 2023-02-20 PROCEDURE — 87428 SARSCOV & INF VIR A&B AG IA: CPT | Performed by: PSYCHOLOGIST

## 2023-02-20 PROCEDURE — 99213 OFFICE O/P EST LOW 20 MIN: CPT | Performed by: PSYCHOLOGIST

## 2023-02-20 RX ORDER — MULTIPLE VITAMINS W/ MINERALS TAB 9MG-400MCG
1 TAB ORAL DAILY
COMMUNITY

## 2023-02-20 RX ORDER — BENZONATATE 100 MG/1
100 CAPSULE ORAL 3 TIMES DAILY PRN
Qty: 30 CAPSULE | Refills: 0 | Status: SHIPPED | OUTPATIENT
Start: 2023-02-20 | End: 2023-03-02

## 2023-02-20 RX ORDER — DEXAMETHASONE SODIUM PHOSPHATE 4 MG/ML
8 INJECTION, SOLUTION INTRA-ARTICULAR; INTRALESIONAL; INTRAMUSCULAR; INTRAVENOUS; SOFT TISSUE ONCE
Status: COMPLETED | OUTPATIENT
Start: 2023-02-20 | End: 2023-02-20

## 2023-02-20 RX ORDER — AZITHROMYCIN 250 MG/1
TABLET, FILM COATED ORAL
Qty: 6 TABLET | Refills: 1 | Status: SHIPPED | OUTPATIENT
Start: 2023-02-20 | End: 2023-03-08

## 2023-02-20 RX ADMIN — DEXAMETHASONE SODIUM PHOSPHATE 8 MG: 4 INJECTION, SOLUTION INTRA-ARTICULAR; INTRALESIONAL; INTRAMUSCULAR; INTRAVENOUS; SOFT TISSUE at 14:52

## 2023-02-20 NOTE — PROGRESS NOTES
"Chief Complaint  Flu Symptoms (Since last week, body aches, cough, diarrhea)    Patient is here for an urgent care/acute visit.  Patient has an established, non-[facility] Primary Care Provider.       Subjective          Mariaelena Easley presents to [facility]   C/o an acute medical care visit and has DIXIE Cristobal as her PCP:    Flu Symptoms  This is a new problem. The current episode started in the past 7 days. The problem occurs intermittently. The problem has been waxing and waning. Associated symptoms include chest pain (left side ), coughing and vomiting (and diarrhea x 1 ). Pertinent negatives include no chills, fatigue, fever or nausea.   Cough  This is a new problem. The current episode started in the past 7 days. The problem has been gradually worsening. The problem occurs constantly. The cough is productive of sputum. Associated symptoms include chest pain (left side ), nasal congestion, rhinorrhea and wheezing. Pertinent negatives include no chills, fever or shortness of breath. She has tried OTC cough suppressant for the symptoms. The treatment provided mild relief. There is no history of asthma or COPD.       Objective   Vital Signs:  /75 (BP Location: Right arm, Patient Position: Sitting, Cuff Size: Adult)   Pulse 85   Temp 97.1 °F (36.2 °C) (Temporal)   Resp 20   Ht 162.6 cm (64\")   Wt 70.3 kg (155 lb)   SpO2 96%   BMI 26.61 kg/m²     BMI:   BMI is >= 25 and <30. (Overweight) The following options were offered after discussion;: exercise counseling/recommendations and nutrition counseling/recommendations      Physical Exam  Vitals and nursing note reviewed.   Constitutional:       Appearance: Normal appearance. She is obese.   HENT:      Head: Normocephalic.      Right Ear: Tympanic membrane normal.      Left Ear: Tympanic membrane normal.      Nose: Nose normal.      Mouth/Throat:      Mouth: Mucous membranes are moist.   Eyes:      Extraocular Movements: Extraocular " movements intact.      Pupils: Pupils are equal, round, and reactive to light.   Cardiovascular:      Rate and Rhythm: Normal rate and regular rhythm.      Pulses: Normal pulses.      Heart sounds: Normal heart sounds.   Pulmonary:      Effort: Pulmonary effort is normal.      Breath sounds: Normal breath sounds.   Abdominal:      General: Abdomen is protuberant. Bowel sounds are normal.      Palpations: Abdomen is soft.   Musculoskeletal:         General: Normal range of motion.      Cervical back: Normal range of motion and neck supple.   Skin:     General: Skin is warm.      Capillary Refill: Capillary refill takes less than 2 seconds.   Neurological:      General: No focal deficit present.      Mental Status: She is alert and oriented to person, place, and time.   Psychiatric:         Mood and Affect: Mood normal.          Result Review :   The following data was reviewed by: Johnnie Mooney MD on 02/20/2023:  Common labs    Common Labs 8/15/22 12/19/22 12/28/22 12/28/22 12/28/22      1003 1003 1003   Glucose     145 (A)   BUN     26 (A)   Creatinine     0.66   Sodium     142   Potassium     4.5   Chloride     100   Calcium     10.0   Albumin     4.2   Total Bilirubin     0.6   Alkaline Phosphatase     82   AST (SGOT)     16   ALT (SGPT)     16   WBC   9.03     Hemoglobin   13.2     Hematocrit   40.7     Platelets   316     Total Cholesterol    163    Triglycerides    75    HDL Cholesterol    58    LDL Cholesterol     91    Hemoglobin A1C 6.5 7.0      (A) Abnormal value          CHEST X-ray was performed this visit.  Indication: PERSISTENT COUGH  Interpretation/Findings: NO ACUTE CARDIOPULMONARY DISEASE  No comparison or previous X-ray was looked at today.   EXAM:    XR Chest, 2 Views     EXAM DATE:    2/20/2023 2:52 PM     CLINICAL HISTORY:    persisten cough; R05.1-Acute cough     TECHNIQUE:    Frontal and lateral views of the chest.     COMPARISON:    No relevant prior studies available.     FINDINGS:     Lungs:  See below.      Pleural space:  Unremarkable.  No pneumothorax.    Heart:  Mild cardiomegaly.    Mediastinum:  Unremarkable.    Bones/joints:  Unremarkable.    Soft tissues:  Calcified left breast implant is noted which likely  accounts for increase attenuation in the left lung base.     IMPRESSION:    No acute cardiopulmonary findings.     This report was finalized on 2/20/2023 3:17 PM by Dr. Krunal Conti MD.    Covid Tests    Common Labsle 3/18/22   COVID19 Not Detected                    Assessment and Plan    Diagnoses and all orders for this visit:    1. Acute cough (Primary)  -     POCT SARS-CoV-2 Antigen MARCIN + Flu  -     dexamethasone (DECADRON) injection 8 mg  -     XR Chest PA & Lateral    2. Bronchitis with wheezing  -     dexamethasone (DECADRON) injection 8 mg    Other orders  -     azithromycin (Zithromax) 250 MG tablet; Take 2 tablets the first day, then 1 tablet daily for 4 days.  Dispense: 6 tablet; Refill: 1  -     benzonatate (TESSALON) 100 MG capsule; Take 1 capsule by mouth 3 (Three) Times a Day As Needed for Cough for up to 10 days.  Dispense: 30 capsule; Refill: 0      I spent 20  minutes caring for Mariaelena on this date of service. This time includes time spent by me in the following activities:reviewing tests, performing a medically appropriate examination and/or evaluation , counseling and educating the patient/family/caregiver, ordering medications, tests, or procedures and documenting information in the medical record       Follow Up   No follow-ups on file.    Patient was given instructions and counseling regarding her condition or for health maintenance advice. Please see specific information pulled into the AVS if appropriate.         This document has been electronically signed by Johnnie Mooney MD  February 20, 2023 17:43 EST

## 2023-03-08 ENCOUNTER — OFFICE VISIT (OUTPATIENT)
Dept: FAMILY MEDICINE CLINIC | Facility: CLINIC | Age: 78
End: 2023-03-08
Payer: MEDICARE

## 2023-03-08 VITALS
SYSTOLIC BLOOD PRESSURE: 112 MMHG | OXYGEN SATURATION: 97 % | TEMPERATURE: 98.1 F | HEART RATE: 102 BPM | RESPIRATION RATE: 18 BRPM | HEIGHT: 64 IN | WEIGHT: 156 LBS | DIASTOLIC BLOOD PRESSURE: 64 MMHG | BODY MASS INDEX: 26.63 KG/M2

## 2023-03-08 DIAGNOSIS — J30.2 SEASONAL ALLERGIC RHINITIS, UNSPECIFIED TRIGGER: Primary | ICD-10-CM

## 2023-03-08 PROCEDURE — 3074F SYST BP LT 130 MM HG: CPT | Performed by: NURSE PRACTITIONER

## 2023-03-08 PROCEDURE — 99213 OFFICE O/P EST LOW 20 MIN: CPT | Performed by: NURSE PRACTITIONER

## 2023-03-08 PROCEDURE — 3078F DIAST BP <80 MM HG: CPT | Performed by: NURSE PRACTITIONER

## 2023-03-08 RX ORDER — CETIRIZINE HYDROCHLORIDE 10 MG/1
10 TABLET ORAL DAILY
Qty: 30 TABLET | Refills: 2 | Status: SHIPPED | OUTPATIENT
Start: 2023-03-08

## 2023-03-08 RX ORDER — FLUTICASONE PROPIONATE 50 MCG
2 SPRAY, SUSPENSION (ML) NASAL DAILY
Qty: 15.8 ML | Refills: 0 | Status: SHIPPED | OUTPATIENT
Start: 2023-03-08

## 2023-03-08 NOTE — PROGRESS NOTES
"Chief Complaint  URI (Diagnosed with Bronchitis on 02/20/2023 by Dr. Mooney - Still with a productive cough./)    Patient is here for an urgent care/acute visit.  Patient has an established, non-[facility] Primary Care Provider.     Nena Easley presents to [facility] for acute care (cough).    URI   This is a recurrent problem. The current episode started 1 to 4 weeks ago. The problem has been waxing and waning (felt better after abx, but states is starting to feel a bit worse.). There has been no fever. Associated symptoms include congestion and coughing. Pertinent negatives include no abdominal pain, chest pain, diarrhea, dysuria, ear pain, headaches, joint pain, joint swelling, nausea, neck pain, plugged ear sensation, rash, rhinorrhea, sinus pain, sneezing, sore throat, swollen glands, vomiting or wheezing. Treatments tried: azithromycin x 2. The treatment provided moderate relief.     Objective   Vital Signs:  /64   Pulse 102   Temp 98.1 °F (36.7 °C) (Temporal)   Resp 18   Ht 162.6 cm (64\")   Wt 70.8 kg (156 lb)   SpO2 97%   BMI 26.78 kg/m²     BMI:   BMI is >= 25 and <30. (Overweight) The following options were offered after discussion;: weight loss educational material (shared in after visit summary)    Physical Exam  Vitals and nursing note reviewed.   Constitutional:       General: She is awake.      Appearance: Normal appearance.   HENT:      Head: Normocephalic.      Right Ear: Hearing, tympanic membrane, ear canal and external ear normal.      Left Ear: Hearing, tympanic membrane, ear canal and external ear normal.      Nose: Nose normal.      Mouth/Throat:      Lips: Pink.      Mouth: Mucous membranes are moist.      Pharynx: Oropharynx is clear.   Eyes:      General: Lids are normal.      Conjunctiva/sclera: Conjunctivae normal.      Pupils: Pupils are equal, round, and reactive to light.   Cardiovascular:      Rate and Rhythm: Normal rate " and regular rhythm.      Heart sounds: Normal heart sounds.   Pulmonary:      Effort: Pulmonary effort is normal.      Breath sounds: Normal breath sounds.   Abdominal:      General: Abdomen is protuberant. Bowel sounds are normal.      Palpations: Abdomen is soft.      Tenderness: There is no abdominal tenderness.   Musculoskeletal:         General: Normal range of motion.      Cervical back: Normal range of motion and neck supple.   Skin:     General: Skin is warm and dry.      Capillary Refill: Capillary refill takes less than 2 seconds.   Neurological:      Mental Status: She is alert and oriented to person, place, and time.      Sensory: Sensation is intact.      Motor: Motor function is intact.      Coordination: Coordination is intact.      Gait: Gait is intact.   Psychiatric:         Attention and Perception: Attention and perception normal.         Mood and Affect: Mood and affect normal.         Speech: Speech normal.         Behavior: Behavior normal. Behavior is cooperative.         Thought Content: Thought content normal.         Cognition and Memory: Cognition normal.         Judgment: Judgment normal.        Result Review :   The following data was reviewed by: ANGELA Munoz on 03/08/2023:    Assessment and Plan    Diagnoses and all orders for this visit:    1. Seasonal allergic rhinitis, unspecified trigger (Primary)  -     cetirizine (zyrTEC) 10 MG tablet; Take 1 tablet by mouth Daily.  Dispense: 30 tablet; Refill: 2  -     fluticasone (FLONASE) 50 MCG/ACT nasal spray; 2 sprays into the nostril(s) as directed by provider Daily.  Dispense: 15.8 mL; Refill: 0         I spent 15 minutes caring for Mariaelena on this date of service. This time includes time spent by me in the following activities:preparing for the visit, reviewing tests, obtaining and/or reviewing a separately obtained history, performing a medically appropriate examination and/or evaluation , counseling and educating the  patient/family/caregiver, ordering medications, tests, or procedures, documenting information in the medical record and independently interpreting results and communicating that information with the patient/family/caregiver     Follow Up   Return if symptoms worsen or fail to improve.    Patient was given instructions and counseling regarding her condition or for health maintenance advice. Please see specific information pulled into the AVS if appropriate.       This document has been electronically signed by ANGELA Munoz  March 8, 2023 21:58 EST

## 2023-03-08 NOTE — PATIENT INSTRUCTIONS
Upper Respiratory Infection, Adult  An upper respiratory infection (URI) affects the nose, throat, and upper airways that lead to the lungs. The most common type of URI is often called the common cold. URIs usually get better on their own, without medical treatment.  What are the causes?  A URI is caused by a germ (virus). You may catch these germs by:  Breathing in droplets from an infected person's cough or sneeze.  Touching something that has the germ on it (is contaminated) and then touching your mouth, nose, or eyes.  What increases the risk?  You are more likely to get a URI if:  You are very young or very old.  You have close contact with others, such as at work, school, or a health care facility.  You smoke.  You have long-term (chronic) heart or lung disease.  You have a weakened disease-fighting system (immune system).  You have nasal allergies or asthma.  You have a lot of stress.  You have poor nutrition.  What are the signs or symptoms?  Runny or stuffy (congested) nose.  Cough.  Sneezing.  Sore throat.  Headache.  Feeling tired (fatigue).  Fever.  Not wanting to eat as much as usual.  Pain in your forehead, behind your eyes, and over your cheekbones (sinus pain).  Muscle aches.  Redness or irritation of the eyes.  Pressure in the ears or face.  How is this treated?  URIs usually get better on their own within 7-10 days. Medicines cannot cure URIs, but your doctor may recommend certain medicines to help relieve symptoms, such as:  Over-the-counter cold medicines.  Medicines to reduce coughing (cough suppressants). Coughing is a type of defense against infection that helps to clear the nose, throat, windpipe, and lungs (respiratory system). Take these medicines only as told by your doctor.  Medicines to lower your fever.  Follow these instructions at home:  Activity  Rest as needed.  If you have a fever, stay home from work or school until your fever is gone, or until your doctor says you may return to  work or school.  You should stay home until you cannot spread the infection anymore (you are not contagious).  Your doctor may have you wear a face mask so you have less risk of spreading the infection.  Relieving symptoms  Rinse your mouth often with salt water. To make salt water, dissolve ½-1 tsp (3-6 g) of salt in 1 cup (237 mL) of warm water.  Use a cool-mist humidifier to add moisture to the air. This can help you breathe more easily.  Eating and drinking  Drink enough fluid to keep your pee (urine) pale yellow.  Eat soups and other clear broths.  General instructions  Take over-the-counter and prescription medicines only as told by your doctor.  Do not smoke or use any products that contain nicotine or tobacco. If you need help quitting, ask your doctor.  Avoid being where people are smoking (avoid secondhand smoke).  Stay up to date on all your shots (immunizations), and get the flu shot every year.  Keep all follow-up visits.  How to prevent the spread of infection to others  Wash your hands with soap and water for at least 20 seconds. If you cannot use soap and water, use hand .  Avoid touching your mouth, face, eyes, or nose.  Cough or sneeze into a tissue or your sleeve or elbow. Do not cough or sneeze into your hand or into the air.  Contact a doctor if:  You are getting worse, not better.  You have any of these:  A fever or chills.  Brown or red mucus in your nose.  Yellow or brown fluid (discharge)coming from your nose.  Pain in your face, especially when you bend forward.  Swollen neck glands.  Pain when you swallow.  White areas in the back of your throat.  Get help right away if:  You have shortness of breath that gets worse.  You have very bad or constant:  Headache.  Ear pain.  Pain in your forehead, behind your eyes, and over your cheekbones (sinus pain).  Chest pain.  You have long-lasting (chronic) lung disease along with any of these:  Making high-pitched whistling sounds when you  breathe, most often when you breathe out (wheezing).  Long-lasting cough (more than 14 days).  Coughing up blood.  A change in your usual mucus.  You have a stiff neck.  You have changes in your:  Vision.  Hearing.  Thinking.  Mood.  These symptoms may be an emergency. Get help right away. Call 911.  Do not wait to see if the symptoms will go away.  Do not drive yourself to the hospital.  Summary  An upper respiratory infection (URI) is caused by a germ (virus). The most common type of URI is often called the common cold.  URIs usually get better within 7-10 days.  Take over-the-counter and prescription medicines only as told by your doctor.  This information is not intended to replace advice given to you by your health care provider. Make sure you discuss any questions you have with your health care provider.  Document Revised: 07/20/2022 Document Reviewed: 07/20/2022  [employer] Patient Education © 2022 [employer]

## 2023-03-17 ENCOUNTER — OFFICE VISIT (OUTPATIENT)
Dept: INTERNAL MEDICINE | Facility: CLINIC | Age: 78
End: 2023-03-17
Payer: MEDICARE

## 2023-03-17 VITALS
HEART RATE: 72 BPM | BODY MASS INDEX: 27.36 KG/M2 | OXYGEN SATURATION: 95 % | DIASTOLIC BLOOD PRESSURE: 76 MMHG | WEIGHT: 159.4 LBS | SYSTOLIC BLOOD PRESSURE: 128 MMHG

## 2023-03-17 DIAGNOSIS — I10 ESSENTIAL HYPERTENSION: ICD-10-CM

## 2023-03-17 DIAGNOSIS — E11.65 TYPE 2 DIABETES MELLITUS WITH HYPERGLYCEMIA, WITHOUT LONG-TERM CURRENT USE OF INSULIN: Primary | ICD-10-CM

## 2023-03-17 LAB
EXPIRATION DATE: NORMAL
GLUCOSE BLDC GLUCOMTR-MCNC: 116 MG/DL (ref 70–130)
HBA1C MFR BLD: 6.8 %
Lab: NORMAL

## 2023-03-17 PROCEDURE — 82962 GLUCOSE BLOOD TEST: CPT | Performed by: PHYSICIAN ASSISTANT

## 2023-03-17 PROCEDURE — 1160F RVW MEDS BY RX/DR IN RCRD: CPT | Performed by: PHYSICIAN ASSISTANT

## 2023-03-17 PROCEDURE — 99213 OFFICE O/P EST LOW 20 MIN: CPT | Performed by: PHYSICIAN ASSISTANT

## 2023-03-17 PROCEDURE — 3074F SYST BP LT 130 MM HG: CPT | Performed by: PHYSICIAN ASSISTANT

## 2023-03-17 PROCEDURE — 1159F MED LIST DOCD IN RCRD: CPT | Performed by: PHYSICIAN ASSISTANT

## 2023-03-17 PROCEDURE — 3078F DIAST BP <80 MM HG: CPT | Performed by: PHYSICIAN ASSISTANT

## 2023-03-17 RX ORDER — AMLODIPINE BESYLATE 5 MG/1
5 TABLET ORAL DAILY
Qty: 90 TABLET | Refills: 1 | Status: SHIPPED | OUTPATIENT
Start: 2023-03-17 | End: 2023-04-04

## 2023-03-17 RX ORDER — HYDROCHLOROTHIAZIDE 25 MG/1
25 TABLET ORAL DAILY
Qty: 90 TABLET | Refills: 1 | Status: SHIPPED | OUTPATIENT
Start: 2023-03-17 | End: 2023-04-04

## 2023-03-17 NOTE — ASSESSMENT & PLAN NOTE
Hypertension is unchanged.  Continue current treatment regimen.  Dietary sodium restriction.  Regular aerobic exercise.  Continue current medications.  Ambulatory blood pressure monitoring.  Blood pressure will be reassessed at the next regular appointment.

## 2023-03-17 NOTE — PROGRESS NOTES
Chief Complaint   Patient presents with   • Diabetes   • Hyperlipidemia   • Hypertension     Follow Up       Subjective     Mariaelena Easley is a 77 y.o. female.        History of Present Illness     Ms. Mariaelena Easley is a 77-year-old female who presents to the clinic today for a follow up.           She was seen in urgent care on 02/20/2023 for bronchitis. She tested negative for COVID-19 and the flu. She was given a steroid injection and a Z-Karan. She took the Z-Karan for 10 days. She went back to the urgent care because she still had a cough. She was told that her throat, ears, and lungs looked good. She was given Zyrtec and Flonase. She did not use nasal spray. She has been taking Zyrtec and it has helped.           She has been under a lot of stress. She has been checking her blood sugar at home. Her blood sugar has been over 140. Her A1c is 6.8. She inquiries about taking metformin.            She has been very tired. She goes to bed at 10:00 PM. She gets about 4 hours of sleep. She either wakes up wide awake or has to go to the bathroom. She tries not to turn her whole light.             Current Outpatient Medications:   •  amLODIPine (NORVASC) 5 MG tablet, Take 1 tablet by mouth Daily., Disp: 90 tablet, Rfl: 1  •  atorvastatin (LIPITOR) 80 MG tablet, TAKE 1 TABLET DAILY, Disp: 90 tablet, Rfl: 3  •  Calcium 500-100 MG-UNIT chewable tablet, Chew 1 tablet Daily., Disp: , Rfl:   •  cetirizine (zyrTEC) 10 MG tablet, Take 1 tablet by mouth Daily., Disp: 30 tablet, Rfl: 2  •  Coenzyme Q10 30 MG/5ML liquid, Take 5 mL by mouth Daily., Disp: , Rfl:   •  fluticasone (FLONASE) 50 MCG/ACT nasal spray, 2 sprays into the nostril(s) as directed by provider Daily., Disp: 15.8 mL, Rfl: 0  •  hydroCHLOROthiazide (HYDRODIURIL) 25 MG tablet, Take 1 tablet by mouth Daily., Disp: 90 tablet, Rfl: 1  •  Lancets (OneTouch Delica Plus [id number]) mis, USE AND DISCARD LANCET TO  CHECK BLOOD SUGAR TWO TIMESA DAY, Disp: 200 each, Rfl: 1  •   metFORMIN ER (GLUCOPHAGE-XR) 500 MG 24 hr tablet, Take 2 po in the am, 2 po in the pm, Disp: 360 tablet, Rfl: 3  •  multivitamin with minerals tablet tablet, Take 1 tablet by mouth Daily., Disp: , Rfl:   •  OneTouch Verio test strip, USE TO TEST 2-3 TIMES DAILY, Disp: 300 each, Rfl: 3  •  rizatriptan MLT (Maxalt-MLT) 10 MG disintegrating tablet, Place 1 tablet on the tongue 1 (One) Time As Needed for Migraine for up to 1 dose. May repeat in 2 hours if needed, Disp: 9 tablet, Rfl: 1     PMFSH  The following portions of the patient's history were reviewed and updated as appropriate: allergies, current medications, past family history, past medical history, past social history, past surgical history and problem list.    Review of Systems   Constitutional: Negative for activity change, appetite change and fatigue.   HENT: Negative for congestion and rhinorrhea.    Respiratory: Negative for chest tightness and shortness of breath.    Cardiovascular: Negative for chest pain and palpitations.   Gastrointestinal: Negative for abdominal pain.   Genitourinary: Negative for dysuria.   Musculoskeletal: Negative for arthralgias and myalgias.   Neurological: Negative for dizziness, weakness, light-headedness and headaches.   Psychiatric/Behavioral: Negative for dysphoric mood. The patient is not nervous/anxious.        Objective   /76   Pulse 72   Wt 72.3 kg (159 lb 6.4 oz)   LMP  (LMP Unknown)   SpO2 95%   BMI 27.36 kg/m²     Physical Exam  Vitals and nursing note reviewed.   Constitutional:       Appearance: She is well-developed.   HENT:      Head: Normocephalic and atraumatic.      Right Ear: External ear normal.      Left Ear: External ear normal.   Eyes:      Conjunctiva/sclera: Conjunctivae normal.   Cardiovascular:      Rate and Rhythm: Normal rate and regular rhythm.   Pulmonary:      Effort: Pulmonary effort is normal.      Breath sounds: Normal breath sounds.   Musculoskeletal:         General: Normal range  of motion.      Cervical back: Normal range of motion.   Skin:     General: Skin is warm and dry.   Psychiatric:         Behavior: Behavior normal.              ASSESSMENT/PLAN    Diagnoses and all orders for this visit:    1. Type 2 diabetes mellitus with hyperglycemia, without long-term current use of insulin (HCC) (Primary)  Assessment & Plan:  Diabetes is improving with treatment.   Continue current treatment regimen.  Reminded to bring in blood sugar diary at next visit.  Dietary recommendations for ADA diet.  Regular aerobic exercise.  Diabetes will be reassessed in 3 months.    Orders:  -     POC Glucose  -     POC Glycosylated Hemoglobin (Hb A1C)    2. Essential hypertension  Assessment & Plan:  Hypertension is unchanged.  Continue current treatment regimen.  Dietary sodium restriction.  Regular aerobic exercise.  Continue current medications.  Ambulatory blood pressure monitoring.  Blood pressure will be reassessed at the next regular appointment.    Orders:  -     amLODIPine (NORVASC) 5 MG tablet; Take 1 tablet by mouth Daily.  Dispense: 90 tablet; Refill: 1  -     hydroCHLOROthiazide (HYDRODIURIL) 25 MG tablet; Take 1 tablet by mouth Daily.  Dispense: 90 tablet; Refill: 1           Return in about 3 months (around 6/17/2023) for Follow up.  Answers for HPI/ROS submitted by the patient on 3/15/2023  Please describe your symptoms.: Follow-up., High Blood Sugar, High Blood Pressure, High Cholesterol  Have you had these symptoms before?: Yes  How long have you been having these symptoms?: Greater than 2 weeks  Please list any medications you are currently taking for this condition.: See List  Please describe any probable cause for these symptoms. : Old age, inactivity, diet, genes.......  What is the primary reason for your visit?: Other      Transcribed from ambient dictation for BISHNU Bell by Genoveva Taylor.  03/17/23   16:11 EDT    Patient or patient representative verbalized consent to the  visit recording.  I have personally performed the services described in this document as transcribed by the above individual, and it is both accurate and complete.

## 2023-04-04 DIAGNOSIS — I10 ESSENTIAL HYPERTENSION: ICD-10-CM

## 2023-04-04 RX ORDER — AMLODIPINE BESYLATE 5 MG/1
TABLET ORAL
Qty: 90 TABLET | Refills: 1 | Status: SHIPPED | OUTPATIENT
Start: 2023-04-04

## 2023-04-04 RX ORDER — HYDROCHLOROTHIAZIDE 25 MG/1
TABLET ORAL
Qty: 90 TABLET | Refills: 1 | Status: SHIPPED | OUTPATIENT
Start: 2023-04-04

## 2023-06-11 DIAGNOSIS — J30.2 SEASONAL ALLERGIC RHINITIS, UNSPECIFIED TRIGGER: ICD-10-CM

## 2023-06-12 RX ORDER — CETIRIZINE HYDROCHLORIDE 10 MG/1
TABLET ORAL
Qty: 30 TABLET | Refills: 2 | Status: SHIPPED | OUTPATIENT
Start: 2023-06-12

## 2023-07-24 DIAGNOSIS — E78.5 HYPERLIPIDEMIA, UNSPECIFIED HYPERLIPIDEMIA TYPE: ICD-10-CM

## 2023-07-24 RX ORDER — ATORVASTATIN CALCIUM 80 MG/1
80 TABLET, FILM COATED ORAL DAILY
Qty: 90 TABLET | Refills: 1 | Status: SHIPPED | OUTPATIENT
Start: 2023-07-24

## 2023-07-24 NOTE — TELEPHONE ENCOUNTER
Caller: Mariaelena Easley    Relationship: Self    Best call back number: [phone]     Requested Prescriptions:   Requested Prescriptions     Pending Prescriptions Disp Refills    atorvastatin (LIPITOR) 80 MG tablet 90 tablet 3     Sig: Take 1 tablet by mouth Daily.        Pharmacy where request should be sent: [facility] - [place] [address] - [phone]  - [phone] FX     Last office visit with prescribing clinician: 6/19/2023   Last telemedicine visit with prescribing clinician: Visit date not found   Next office visit with prescribing clinician: 9/19/2023     Additional details provided by patient: [phone] IS THE FAX TO [facility] IF THERE ARE ANY ISSUES    Does the patient have less than a 3 day supply:  [] Yes  [x] No    Would you like a call back once the refill request has been completed: [] Yes [x] No    If the office needs to give you a call back, can they leave a voicemail: [] Yes [x] No    Estella Reese   07/24/23 11:45 EDT

## 2023-09-12 ENCOUNTER — OFFICE VISIT (OUTPATIENT)
Dept: FAMILY MEDICINE CLINIC | Facility: CLINIC | Age: 78
End: 2023-09-12
Payer: MEDICARE

## 2023-09-12 VITALS
HEIGHT: 64 IN | RESPIRATION RATE: 18 BRPM | DIASTOLIC BLOOD PRESSURE: 65 MMHG | SYSTOLIC BLOOD PRESSURE: 139 MMHG | BODY MASS INDEX: 25.61 KG/M2 | HEART RATE: 66 BPM | OXYGEN SATURATION: 96 % | WEIGHT: 150 LBS

## 2023-09-12 DIAGNOSIS — U07.1 COVID-19 VIRUS INFECTION: ICD-10-CM

## 2023-09-12 DIAGNOSIS — R05.9 COUGH, UNSPECIFIED TYPE: Primary | ICD-10-CM

## 2023-09-12 LAB
EXPIRATION DATE: ABNORMAL
FLUAV AG UPPER RESP QL IA.RAPID: NOT DETECTED
FLUBV AG UPPER RESP QL IA.RAPID: NOT DETECTED
INTERNAL CONTROL: ABNORMAL
Lab: ABNORMAL
SARS-COV-2 AG UPPER RESP QL IA.RAPID: DETECTED

## 2023-09-12 PROCEDURE — 3075F SYST BP GE 130 - 139MM HG: CPT | Performed by: FAMILY MEDICINE

## 2023-09-12 PROCEDURE — 1159F MED LIST DOCD IN RCRD: CPT | Performed by: FAMILY MEDICINE

## 2023-09-12 PROCEDURE — 3078F DIAST BP <80 MM HG: CPT | Performed by: FAMILY MEDICINE

## 2023-09-12 PROCEDURE — 99213 OFFICE O/P EST LOW 20 MIN: CPT | Performed by: FAMILY MEDICINE

## 2023-09-12 PROCEDURE — 1160F RVW MEDS BY RX/DR IN RCRD: CPT | Performed by: FAMILY MEDICINE

## 2023-09-12 PROCEDURE — 87428 SARSCOV & INF VIR A&B AG IA: CPT | Performed by: FAMILY MEDICINE

## 2023-09-12 RX ORDER — AZITHROMYCIN 250 MG/1
TABLET, FILM COATED ORAL
Qty: 6 TABLET | Refills: 0 | Status: SHIPPED | OUTPATIENT
Start: 2023-09-12

## 2023-09-12 RX ORDER — PREDNISONE 5 MG/1
5 TABLET ORAL DAILY
Qty: 7 TABLET | Refills: 0 | Status: SHIPPED | OUTPATIENT
Start: 2023-09-12 | End: 2023-09-19

## 2023-09-12 RX ORDER — GUAIFENESIN/DEXTROMETHORPHAN 100-10MG/5
10 SYRUP ORAL 3 TIMES DAILY PRN
Qty: 300 ML | Refills: 0 | Status: SHIPPED | OUTPATIENT
Start: 2023-09-12 | End: 2023-09-22

## 2023-09-12 NOTE — PROGRESS NOTES
"Chief Complaint  Cough (Congestion, Body aches, No known fever & diarrhea./Started last Wednesday -  was seen here on Tuesday last weekend with a sinus infection states she's got sick after that./)    Patient is here for an urgent care/acute visit.  Patient has an established, non-[facility] Primary Care Provider.       Nena Easley presents to [facility]    History of Present Illness  The patient is a 77 yo female who is here today because of cough, congestion, body aches for 5 days. No fever, chills, shortness of breath  Cough  This is a new problem. The current episode started in the past 7 days. The problem has been unchanged. The problem occurs constantly. The cough is Non-productive. Associated symptoms include nasal congestion and postnasal drip. Pertinent negatives include no chest pain, chills, ear pain, fever, rhinorrhea, sore throat, shortness of breath or wheezing. Nothing aggravates the symptoms. She has tried OTC cough suppressant for the symptoms. The treatment provided mild relief.     Objective   Vital Signs:  /65   Pulse 66   Resp 18   Ht 162.6 cm (64\")   Wt 68 kg (150 lb)   SpO2 96%   BMI 25.75 kg/m²     Physical Exam  Vitals and nursing note reviewed.   Constitutional:       Appearance: Normal appearance.   HENT:      Head: Normocephalic and atraumatic.      Right Ear: Tympanic membrane and ear canal normal.      Left Ear: Tympanic membrane and ear canal normal.      Nose: Nose normal. Congestion and rhinorrhea present.      Mouth/Throat:      Mouth: Mucous membranes are moist.      Pharynx: No pharyngeal swelling, oropharyngeal exudate or posterior oropharyngeal erythema.   Eyes:      Extraocular Movements: Extraocular movements intact.      Pupils: Pupils are equal, round, and reactive to light.   Cardiovascular:      Rate and Rhythm: Normal rate and regular rhythm. No extrasystoles are present.     Heart sounds: Normal heart " sounds. No murmur heard.  Pulmonary:      Effort: Pulmonary effort is normal.      Breath sounds: Normal breath sounds. No decreased breath sounds, wheezing, rhonchi or rales.   Abdominal:      Palpations: Abdomen is soft. There is no mass.      Tenderness: There is no abdominal tenderness. There is no right CVA tenderness, left CVA tenderness, guarding or rebound.   Musculoskeletal:         General: Normal range of motion.      Cervical back: Normal range of motion and neck supple.      Right lower leg: No edema.      Left lower leg: No edema.   Skin:     Findings: No rash.   Neurological:      General: No focal deficit present.      Mental Status: She is alert and oriented to person, place, and time.      Sensory: Sensation is intact.      Motor: Motor function is intact.      Coordination: Coordination is intact.      Gait: Gait is intact.      Deep Tendon Reflexes: Reflexes are normal and symmetric.   Psychiatric:         Attention and Perception: Attention and perception normal.         Mood and Affect: Mood normal.         Speech: Speech normal.         Behavior: Behavior normal. Behavior is cooperative.         Thought Content: Thought content normal.        Result Review :                 Assessment and Plan    Diagnoses and all orders for this visit:    1. Cough, unspecified type (Primary)  -     POCT SARS-CoV-2 Antigen MARCIN + Flu    2. COVID-19 virus infection    Other orders  -     azithromycin (Zithromax) 250 MG tablet; Take 2 tablets the first day, then 1 tablet daily for 4 days.  Dispense: 6 tablet; Refill: 0  -     predniSONE (DELTASONE) 5 MG tablet; Take 1 tablet by mouth Daily for 7 days.  Dispense: 7 tablet; Refill: 0  -     guaiFENesin-dextromethorphan (ROBITUSSIN DM) 100-10 MG/5ML syrup; Take 10 mL by mouth 3 (Three) Times a Day As Needed for Cough or Congestion for up to 10 days.  Dispense: 300 mL; Refill: 0           I spent 15 minutes caring for Mariaelena on this date of service. This time  includes time spent by me in the following activities:preparing for the visit, performing a medically appropriate examination and/or evaluation , counseling and educating the patient/family/caregiver, ordering medications, tests, or procedures, and documenting information in the medical record    Follow Up   Return if symptoms worsen or fail to improve.    Patient was given instructions and counseling regarding her condition or for health maintenance advice. Please see specific information pulled into the AVS if appropriate.     The patient was advised to quarantine according to the CDC guidelines. Supportive treatment and counseling provided.  Instructions on when to go to the emergency room provided, including shortness of breath, worsening condition, etc.    The patient was advised rest, increase oral fluids, may take tylenol or ibuprofen as needed. RTC as needed if symptoms worsen or fail to improve.      This document has been electronically signed by Davie Alvares MD  September 12, 2023 15:20 EDT

## 2023-09-19 ENCOUNTER — OFFICE VISIT (OUTPATIENT)
Dept: INTERNAL MEDICINE | Facility: CLINIC | Age: 78
End: 2023-09-19
Payer: MEDICARE

## 2023-09-19 ENCOUNTER — LAB (OUTPATIENT)
Dept: LAB | Facility: HOSPITAL | Age: 78
End: 2023-09-19
Payer: MEDICARE

## 2023-09-19 VITALS
SYSTOLIC BLOOD PRESSURE: 100 MMHG | WEIGHT: 154.2 LBS | OXYGEN SATURATION: 96 % | DIASTOLIC BLOOD PRESSURE: 60 MMHG | HEART RATE: 70 BPM | HEIGHT: 64 IN | BODY MASS INDEX: 26.32 KG/M2

## 2023-09-19 DIAGNOSIS — E11.65 TYPE 2 DIABETES MELLITUS WITH HYPERGLYCEMIA, WITHOUT LONG-TERM CURRENT USE OF INSULIN: ICD-10-CM

## 2023-09-19 DIAGNOSIS — Z23 NEED FOR INFLUENZA VACCINATION: ICD-10-CM

## 2023-09-19 DIAGNOSIS — E11.65 TYPE 2 DIABETES MELLITUS WITH HYPERGLYCEMIA, WITHOUT LONG-TERM CURRENT USE OF INSULIN: Primary | ICD-10-CM

## 2023-09-19 DIAGNOSIS — U07.1 COVID-19: ICD-10-CM

## 2023-09-19 DIAGNOSIS — M25.512 ACUTE PAIN OF LEFT SHOULDER: ICD-10-CM

## 2023-09-19 LAB
EXPIRATION DATE: NORMAL
GLUCOSE BLDC GLUCOMTR-MCNC: 131 MG/DL (ref 70–130)
HBA1C MFR BLD: 6.9 %
Lab: NORMAL

## 2023-09-19 PROCEDURE — 80053 COMPREHEN METABOLIC PANEL: CPT

## 2023-09-19 NOTE — PROGRESS NOTES
Immunization  Immunization performed in right deltoid by Ibis Kong MA. Patient tolerated the procedure well without complications.  09/19/23   Ibis Kong MA

## 2023-09-19 NOTE — PROGRESS NOTES
Chief Complaint   Patient presents with    Diabetes    Hypertension    Hyperlipidemia     Follow Up       Subjective     Mariaelena Easley is a 78 y.o. female.        History of Present Illness     Ms. Mariaelena Easley is a 78-year-old female who presents today for a follow-up.    She started feeling badly around 09/06/2023. She did not think it was COVID-19 but states she self-medicated with DayQuil and NyQuil for a few days as she had chest congestion. She started having diarrhea with everything she was eating. She went to the wellness clinic on 09/12/2023, and she was told she had COVID-19. She was given a Z-Karan and prednisone. She took a home COVID-19 test on 09/17/2023 and 09/18/2023, and they were negative. Her cough has improved. She completed the Z-Karan and finished the 7-day prednisone tablets yesterday, 09/18/2023. She notes that she is still trying to get her energy level back up after having COVID-19. She has not had previously had COVID-19 prior to this.     She checked her blood glucose this morning, and it was 167 mg/dL. Her A1c was 6.8 percent.     She is doing physical therapy for her shoulder and lower back. She cannot sleep on her left side. She was provided exercises to perform for her shoulder. She will start soon to start exercises on her lower back. She is unsure if it is providing much relief for her shoulder.     She had surgery on her hands for a trigger finger years ago.    Her blood pressure is low today. She does not check her blood pressure at home very often. She is currently fasting for blood work to be completed. She states that with her recent diarrhea and not having an appetite, she was approximately 153 pounds today when she was closer to 160 pounds prior getting sick.             Current Outpatient Medications:     amLODIPine (NORVASC) 5 MG tablet, TAKE 1 TABLET DAILY, Disp: 90 tablet, Rfl: 1    atorvastatin (LIPITOR) 80 MG tablet, Take 1 tablet by mouth Daily., Disp: 90 tablet, Rfl:  1    Calcium 500-100 MG-UNIT chewable tablet, Chew 1 tablet Daily., Disp: , Rfl:     cetirizine (zyrTEC) 10 MG tablet, TAKE 1 TABLET BY MOUTH ONCE DAILY, Disp: 30 tablet, Rfl: 2    Coenzyme Q10 30 MG/5ML liquid, Take 5 mL by mouth Daily., Disp: , Rfl:     fluticasone (FLONASE) 50 MCG/ACT nasal spray, 2 sprays into the nostril(s) as directed by provider Daily., Disp: 15.8 mL, Rfl: 0    hydroCHLOROthiazide (HYDRODIURIL) 25 MG tablet, TAKE 1 TABLET DAILY, Disp: 90 tablet, Rfl: 1    Lancets (OneTouch Delica Plus [id number]) misc, USE AND DISCARD LANCET TO  CHECK BLOOD SUGAR TWO TIMESA DAY, Disp: 200 each, Rfl: 1    metFORMIN ER (GLUCOPHAGE-XR) 500 MG 24 hr tablet, Take 2 po in the am, 2 po in the pm, Disp: 360 tablet, Rfl: 3    multivitamin with minerals tablet tablet, Take 1 tablet by mouth Daily., Disp: , Rfl:     OneTouch Verio test strip, USE TO TEST 2-3 TIMES DAILY, Disp: 300 each, Rfl: 3    rizatriptan MLT (Maxalt-MLT) 10 MG disintegrating tablet, Place 1 tablet on the tongue 1 (One) Time As Needed for Migraine for up to 1 dose. May repeat in 2 hours if needed, Disp: 9 tablet, Rfl: 1     PMFSH  The following portions of the patient's history were reviewed and updated as appropriate: allergies, current medications, past family history, past medical history, past social history, past surgical history, and problem list.    Review of Systems   Constitutional:  Negative for activity change, appetite change and fatigue.   HENT:  Negative for congestion and rhinorrhea.    Respiratory:  Negative for chest tightness and shortness of breath.    Cardiovascular:  Negative for chest pain and palpitations.   Gastrointestinal:  Negative for abdominal pain.   Genitourinary:  Negative for dysuria.   Musculoskeletal:  Negative for arthralgias and myalgias.   Neurological:  Negative for dizziness, weakness, light-headedness and headaches.   Psychiatric/Behavioral:  Negative for dysphoric mood. The patient is not nervous/anxious.   "    Objective   /60   Pulse 70   Ht 162.6 cm (64\")   Wt 69.9 kg (154 lb 3.2 oz)   LMP  (LMP Unknown)   SpO2 96%   BMI 26.47 kg/m²     Physical Exam  Vitals and nursing note reviewed.   Constitutional:       Appearance: She is well-developed.   HENT:      Head: Normocephalic.      Right Ear: Hearing, tympanic membrane, ear canal and external ear normal.      Left Ear: Hearing, tympanic membrane, ear canal and external ear normal.      Nose: Nose normal.   Eyes:      Conjunctiva/sclera: Conjunctivae normal.      Pupils: Pupils are equal, round, and reactive to light.   Cardiovascular:      Rate and Rhythm: Normal rate and regular rhythm.      Heart sounds: Normal heart sounds.   Pulmonary:      Effort: Pulmonary effort is normal.      Breath sounds: Normal breath sounds. No decreased breath sounds, wheezing, rhonchi or rales.   Musculoskeletal:         General: Normal range of motion.      Cervical back: Normal range of motion.   Skin:     General: Skin is warm and dry.   Neurological:      Mental Status: She is alert.   Psychiatric:         Behavior: Behavior normal.       Results for orders placed or performed in visit on 09/19/23   POC Glucose    Specimen: Blood   Result Value Ref Range    Glucose 131 (A) 70 - 130 mg/dL   POC Glycosylated Hemoglobin (Hb A1C)    Specimen: Blood   Result Value Ref Range    Hemoglobin A1C 6.9 %    Lot Number [id number]     Expiration Date 5/10/2025         ASSESSMENT/PLAN    Diagnoses and all orders for this visit:    1. Type 2 diabetes mellitus with hyperglycemia, without long-term current use of insulin (Primary)  Assessment & Plan:  Diabetes is unchanged.   Continue current treatment regimen.  Reminded to bring in blood sugar diary at next visit.  Dietary recommendations for ADA diet.  Regular aerobic exercise.  Diabetes will be reassessed in 3 months.    Orders:  -     POC Glucose  -     POC Glycosylated Hemoglobin (Hb A1C)  -     Comprehensive Metabolic Panel; " Future    2. Acute pain of left shoulder  -     Ambulatory Referral to Orthopedic Surgery    3. COVID-19  Comments:  Continue symptomatic care, increase rest and fluids.    4. Need for influenza vaccination  -     Fluzone High-Dose 65+yrs             Return in about 3 months (around 12/19/2023) for Medicare Wellness.        Transcribed from ambient dictation for BISHNU Bell by Lisa Oneill.  09/19/23   14:54 EDT    Patient or patient representative verbalized consent to the visit recording.  I have personally performed the services described in this document as transcribed by the above individual, and it is both accurate and complete.

## 2023-09-20 LAB
ALBUMIN SERPL-MCNC: 4.5 G/DL (ref 3.5–5.2)
ALBUMIN/GLOB SERPL: 1.4 G/DL
ALP SERPL-CCNC: 82 U/L (ref 39–117)
ALT SERPL W P-5'-P-CCNC: 11 U/L (ref 1–33)
ANION GAP SERPL CALCULATED.3IONS-SCNC: 12.8 MMOL/L (ref 5–15)
AST SERPL-CCNC: 19 U/L (ref 1–32)
BILIRUB SERPL-MCNC: 0.5 MG/DL (ref 0–1.2)
BUN SERPL-MCNC: 15 MG/DL (ref 8–23)
BUN/CREAT SERPL: 18.5 (ref 7–25)
CALCIUM SPEC-SCNC: 10.5 MG/DL (ref 8.6–10.5)
CHLORIDE SERPL-SCNC: 94 MMOL/L (ref 98–107)
CO2 SERPL-SCNC: 30.2 MMOL/L (ref 22–29)
CREAT SERPL-MCNC: 0.81 MG/DL (ref 0.57–1)
EGFRCR SERPLBLD CKD-EPI 2021: 75.3 ML/MIN/1.73
GLOBULIN UR ELPH-MCNC: 3.2 GM/DL
GLUCOSE SERPL-MCNC: 121 MG/DL (ref 65–99)
POTASSIUM SERPL-SCNC: 4.3 MMOL/L (ref 3.5–5.2)
PROT SERPL-MCNC: 7.7 G/DL (ref 6–8.5)
SODIUM SERPL-SCNC: 137 MMOL/L (ref 136–145)

## 2023-10-20 ENCOUNTER — OFFICE VISIT (OUTPATIENT)
Dept: INTERNAL MEDICINE | Facility: CLINIC | Age: 78
End: 2023-10-20
Payer: MEDICARE

## 2023-10-20 VITALS
HEART RATE: 73 BPM | OXYGEN SATURATION: 99 % | BODY MASS INDEX: 26.8 KG/M2 | HEIGHT: 64 IN | WEIGHT: 157 LBS | TEMPERATURE: 98 F | DIASTOLIC BLOOD PRESSURE: 60 MMHG | SYSTOLIC BLOOD PRESSURE: 130 MMHG

## 2023-10-20 DIAGNOSIS — M25.512 CHRONIC LEFT SHOULDER PAIN: ICD-10-CM

## 2023-10-20 DIAGNOSIS — M54.50 CHRONIC BILATERAL LOW BACK PAIN WITHOUT SCIATICA: ICD-10-CM

## 2023-10-20 DIAGNOSIS — I10 ESSENTIAL HYPERTENSION: Primary | ICD-10-CM

## 2023-10-20 DIAGNOSIS — G89.29 CHRONIC LEFT SHOULDER PAIN: ICD-10-CM

## 2023-10-20 DIAGNOSIS — E11.9 TYPE 2 DIABETES MELLITUS WITHOUT COMPLICATION, WITHOUT LONG-TERM CURRENT USE OF INSULIN: ICD-10-CM

## 2023-10-20 DIAGNOSIS — G89.29 CHRONIC BILATERAL LOW BACK PAIN WITHOUT SCIATICA: ICD-10-CM

## 2023-10-20 DIAGNOSIS — R73.09 HEMOGLOBIN A1C LESS THAN 7.0%: ICD-10-CM

## 2023-10-20 NOTE — PROGRESS NOTES
Subjective   Chief Complaint   Patient presents with    Back Pain    Establish Care       Mariaelena Easley is a 78 y.o. female here today as a new patient to establish care.  Pt is switching care from BISHNU White.  Pt presents with HTN and Diabetes.  Her most recent A1c was 6.9 in September.  Pt complains of mid to lower back pain and left shoulder pain.  She has been doing PT on her shoulder for 7 weeks.  She states the pain has improved some but still painful.  Feels like it's going to lock up.   Unable to put her arm behind her back.  She states her insurance has denied PT of her back.  The back pain is intermittent and chronic.  Her back is aggravated by activities.    Review of Systems   Constitutional:  Negative for activity change, appetite change and fatigue.   HENT:  Negative for congestion.    Respiratory:  Negative for cough and shortness of breath.    Cardiovascular:  Negative for chest pain and leg swelling.   Gastrointestinal:  Negative for abdominal pain.   Musculoskeletal:  Positive for arthralgias and back pain.   Neurological:  Negative for dizziness, weakness and confusion.   Psychiatric/Behavioral:  Negative for behavioral problems and decreased concentration.        Past Medical History:   Diagnosis Date    Cancer     Cyst of uterus     Diabetes mellitus     Eczema     Hyperlipidemia     Hypertension     Seborrheic dermatitis     Thyroid disease      Past Surgical History:   Procedure Laterality Date    AUGMENTATION MAMMAPLASTY Bilateral     BREAST EXCISIONAL BIOPSY Right      SECTION       Family History   Problem Relation Age of Onset    Heart disease Mother     Arthritis Father     Heart attack Father     Skin cancer Father     COPD Father     Lung cancer Sister     Myelodysplastic syndrome Sister     Diabetes Brother     Heart attack Brother     Hyperlipidemia Brother     Breast cancer Neg Hx     Ovarian cancer Neg Hx      Social History     Tobacco Use   Smoking Status Former  "   Packs/day: 1.00    Years: 15.00    Additional pack years: 0.00    Total pack years: 15.00    Types: Cigarettes    Quit date: 12/15/1995    Years since quittin.8   Smokeless Tobacco Never      Social History     Substance and Sexual Activity   Alcohol Use Yes    Comment: occas      Current Outpatient Medications on File Prior to Visit   Medication Sig    amLODIPine (NORVASC) 5 MG tablet TAKE 1 TABLET DAILY    atorvastatin (LIPITOR) 80 MG tablet Take 1 tablet by mouth Daily.    Calcium 500-100 MG-UNIT chewable tablet Chew 1 tablet Daily.    Coenzyme Q10 30 MG/5ML liquid Take 5 mL by mouth Daily.    fluticasone (FLONASE) 50 MCG/ACT nasal spray 2 sprays into the nostril(s) as directed by provider Daily.    hydroCHLOROthiazide (HYDRODIURIL) 25 MG tablet TAKE 1 TABLET DAILY    Lancets (OneTouch Delica Plus [id number]) misc USE AND DISCARD LANCET TO  CHECK BLOOD SUGAR TWO TIMESA DAY    metFORMIN ER (GLUCOPHAGE-XR) 500 MG 24 hr tablet Take 2 po in the am, 2 po in the pm    multivitamin with minerals tablet tablet Take 1 tablet by mouth Daily.    OneTouch Verio test strip USE TO TEST 2-3 TIMES DAILY    rizatriptan MLT (Maxalt-MLT) 10 MG disintegrating tablet Place 1 tablet on the tongue 1 (One) Time As Needed for Migraine for up to 1 dose. May repeat in 2 hours if needed    [DISCONTINUED] cetirizine (zyrTEC) 10 MG tablet TAKE 1 TABLET BY MOUTH ONCE DAILY     No current facility-administered medications on file prior to visit.     Allergies   Allergen Reactions    Codeine Nausea Only and Dizziness    Sulfa Antibiotics Rash       Objective   Vitals:    10/20/23 1327   BP: 130/60   BP Location: Left arm   Patient Position: Sitting   Pulse: 73   Temp: 98 °F (36.7 °C)   SpO2: 99%   Weight: 71.2 kg (157 lb)   Height: 162.6 cm (64\")     Body mass index is 26.95 kg/m².    Physical Exam  Vitals and nursing note reviewed.   HENT:      Head: Normocephalic.   Eyes:      Pupils: Pupils are equal, round, and reactive to light. "   Cardiovascular:      Rate and Rhythm: Normal rate and regular rhythm.      Pulses: Normal pulses.      Heart sounds: Normal heart sounds.   Pulmonary:      Effort: Pulmonary effort is normal.      Breath sounds: Normal breath sounds.   Musculoskeletal:      Left shoulder: Bony tenderness present. No swelling. Decreased range of motion.        Back:    Skin:     General: Skin is warm and dry.      Capillary Refill: Capillary refill takes less than 2 seconds.   Neurological:      General: No focal deficit present.      Mental Status: She is alert and oriented to person, place, and time.      Gait: Gait is intact.   Psychiatric:         Attention and Perception: Attention normal.         Mood and Affect: Mood normal.         Behavior: Behavior normal.         Assessment & Plan   Problem List Items Addressed This Visit          Cardiac and Vasculature    Essential hypertension - Primary       Endocrine and Metabolic    Diabetes mellitus     Other Visit Diagnoses       Chronic left shoulder pain        Relevant Orders    Ambulatory Referral to Orthopedic Surgery    Chronic bilateral low back pain without sciatica        Hemoglobin A1c less than 7.0%               HTN stable on Amlodipine/HCTZ  DM well controlled with A1c 6.9 1 month ago  Refer to Dr Amaya per pt request for shoulder pain not improved with PT  Pt does not wish to have back XR or referral at this time      Current Outpatient Medications:     amLODIPine (NORVASC) 5 MG tablet, TAKE 1 TABLET DAILY, Disp: 90 tablet, Rfl: 1    atorvastatin (LIPITOR) 80 MG tablet, Take 1 tablet by mouth Daily., Disp: 90 tablet, Rfl: 1    Calcium 500-100 MG-UNIT chewable tablet, Chew 1 tablet Daily., Disp: , Rfl:     Coenzyme Q10 30 MG/5ML liquid, Take 5 mL by mouth Daily., Disp: , Rfl:     fluticasone (FLONASE) 50 MCG/ACT nasal spray, 2 sprays into the nostril(s) as directed by provider Daily., Disp: 15.8 mL, Rfl: 0    hydroCHLOROthiazide (HYDRODIURIL) 25 MG tablet, TAKE 1  TABLET DAILY, Disp: 90 tablet, Rfl: 1    Lancets (OneTouch Delica Plus [id number]) misc, USE AND DISCARD LANCET TO  CHECK BLOOD SUGAR TWO TIMESA DAY, Disp: 200 each, Rfl: 1    metFORMIN ER (GLUCOPHAGE-XR) 500 MG 24 hr tablet, Take 2 po in the am, 2 po in the pm, Disp: 360 tablet, Rfl: 3    multivitamin with minerals tablet tablet, Take 1 tablet by mouth Daily., Disp: , Rfl:     OneTouch Verio test strip, USE TO TEST 2-3 TIMES DAILY, Disp: 300 each, Rfl: 3    rizatriptan MLT (Maxalt-MLT) 10 MG disintegrating tablet, Place 1 tablet on the tongue 1 (One) Time As Needed for Migraine for up to 1 dose. May repeat in 2 hours if needed, Disp: 9 tablet, Rfl: 1       Plan of care reviewed with the patient at the conclusion of today's visit.  Education was provided regarding diagnosis, management, and any prescribed or recommended OTC medications.  Patient verbalized understanding of and agreement with management plan.     Return in about 2 months (around 12/20/2023) for Medicare Wellness.        ANGELA Andrade

## 2023-10-31 RX ORDER — BLOOD-GLUCOSE METER
EACH MISCELLANEOUS
Refills: 3 | OUTPATIENT
Start: 2023-10-31

## 2023-11-01 ENCOUNTER — OFFICE VISIT (OUTPATIENT)
Dept: ORTHOPEDIC SURGERY | Facility: CLINIC | Age: 78
End: 2023-11-01
Payer: MEDICARE

## 2023-11-01 VITALS
WEIGHT: 153 LBS | BODY MASS INDEX: 27.11 KG/M2 | SYSTOLIC BLOOD PRESSURE: 120 MMHG | HEIGHT: 63 IN | DIASTOLIC BLOOD PRESSURE: 84 MMHG

## 2023-11-01 DIAGNOSIS — M25.512 CHRONIC LEFT SHOULDER PAIN: Primary | ICD-10-CM

## 2023-11-01 DIAGNOSIS — G89.29 CHRONIC LEFT SHOULDER PAIN: Primary | ICD-10-CM

## 2023-11-01 PROCEDURE — 99204 OFFICE O/P NEW MOD 45 MIN: CPT | Performed by: ORTHOPAEDIC SURGERY

## 2023-11-01 RX ORDER — NAPROXEN SODIUM 220 MG
220 TABLET ORAL 2 TIMES DAILY PRN
COMMUNITY

## 2023-11-01 NOTE — PROGRESS NOTES
[facility] Note    Subjective     Chief Complaint   Patient presents with    Left Shoulder - Pain        HPI    Mariaelena Easley is a 78 y.o. female who presents with new problem of: left shoulder pain.  Onset: atraumatic and gradual in nature. The issue has been ongoing for 10 month(s). Pain is a 8/10 on the pain scale. Pain is described as stabbing. Associated symptoms include pain and popping. The pain is worse with sleeping, lying on affected side, and any movement of the joint; resting improve the pain. Previous treatments have included: NSAIDS and physical therapy.  Pain with certain motions, particularly with overhead activities, and when she sleeps on it at night.  Previous physical therapy without long-term benefit.    I have reviewed the following portions of the patient's history and agree with: History of Present Illness and Review of Systems    Patient Active Problem List   Diagnosis    Anemia    Hyperlipidemia    Migraine headache    Thyroid disease    Episodic recurrent vertigo    Osteopenia    Seborrheic eczema    Cyst of uterus    Cyst of breast    Essential hypertension    Diabetes mellitus    Squamous cell skin cancer, face    Chest pain     Past Medical History:   Diagnosis Date    Cancer     CTS (carpal tunnel syndrome)     Cyst of uterus     Diabetes mellitus     Eczema     Hyperlipidemia     Hypertension     Low back strain presently - off & on    Rotator cuff syndrome presently    Seborrheic dermatitis     Thyroid disease       Past Surgical History:   Procedure Laterality Date    AUGMENTATION MAMMAPLASTY Bilateral     BREAST EXCISIONAL BIOPSY Right      SECTION      HAND SURGERY      TRIGGER POINT INJECTION      WRIST SURGERY        Family History   Problem Relation Age of Onset    Heart disease Mother     Arthritis Father     Heart attack Father     Skin cancer Father     COPD Father     Cancer Father         Skin    Lung cancer  Sister     Myelodysplastic syndrome Sister     Broken bones Sister         fell off a ladder    Cancer Sister         Lower Lobe Left Lung    Diabetes Brother     Heart attack Brother     Hyperlipidemia Brother     Cancer Brother         Mouth & Neck/[employer] [place]    Rheumatologic disease Paternal Grandmother         many years in wheelchair     Breast cancer Neg Hx     Ovarian cancer Neg Hx      Social History     Socioeconomic History    Marital status:    Tobacco Use    Smoking status: Former     Packs/day: 0.50     Years: 3.00     Additional pack years: 0.00     Total pack years: 1.50     Types: Cigarettes     Start date: 1966     Quit date: 9/15/1969     Years since quittin.1    Smokeless tobacco: Never   Vaping Use    Vaping Use: Never used   Substance and Sexual Activity    Alcohol use: Yes     Alcohol/week: 1.0 standard drink of alcohol     Types: 1 Glasses of wine per week     Comment: Very seldom.....at dinner socially...mostly not even a month    Drug use: No    Sexual activity: Not Currently     Partners: Male     Comment: IUD - Failed, had son   had vasectomy.      Current Outpatient Medications on File Prior to Visit   Medication Sig Dispense Refill    amLODIPine (NORVASC) 5 MG tablet TAKE 1 TABLET DAILY 90 tablet 1    atorvastatin (LIPITOR) 80 MG tablet Take 1 tablet by mouth Daily. 90 tablet 1    Calcium 500-100 MG-UNIT chewable tablet Chew 1 tablet Daily.      Coenzyme Q10 30 MG/5ML liquid Take 5 mL by mouth Daily.      fluticasone (FLONASE) 50 MCG/ACT nasal spray 2 sprays into the nostril(s) as directed by provider Daily. 15.8 mL 0    hydroCHLOROthiazide (HYDRODIURIL) 25 MG tablet TAKE 1 TABLET DAILY 90 tablet 1    Lancets (OneTouch Delica Plus [id number]) misc USE AND DISCARD LANCET TO  CHECK BLOOD SUGAR TWO TIMESA  each 1    metFORMIN ER (GLUCOPHAGE-XR) 500 MG 24 hr tablet Take 2 po in the am, 2 po in the pm 360 tablet 3    multivitamin with  minerals tablet tablet Take 1 tablet by mouth Daily.      naproxen sodium (ALEVE) 220 MG tablet Take 1 tablet by mouth 2 (Two) Times a Day As Needed.      OneTouch Verio test strip USE TO TEST 2-3 TIMES DAILY 300 each 3    rizatriptan MLT (Maxalt-MLT) 10 MG disintegrating tablet Place 1 tablet on the tongue 1 (One) Time As Needed for Migraine for up to 1 dose. May repeat in 2 hours if needed 9 tablet 1     No current facility-administered medications on file prior to visit.      Allergies   Allergen Reactions    Codeine Nausea Only and Dizziness    Sulfa Antibiotics Rash        Review of Systems   Constitutional:  Negative for activity change, appetite change, chills, diaphoresis, fatigue, fever and unexpected weight change.   HENT:  Negative for congestion, dental problem, drooling, ear discharge, ear pain, facial swelling, hearing loss, mouth sores, nosebleeds, postnasal drip, rhinorrhea, sinus pressure, sneezing, sore throat, tinnitus, trouble swallowing and voice change.    Eyes:  Negative for photophobia, pain, discharge, redness, itching and visual disturbance.   Respiratory:  Negative for apnea, cough, choking, chest tightness, shortness of breath, wheezing and stridor.    Cardiovascular:  Negative for chest pain, palpitations and leg swelling.   Gastrointestinal:  Negative for abdominal distention, abdominal pain, anal bleeding, blood in stool, constipation, diarrhea, nausea, rectal pain and vomiting.   Endocrine: Negative for cold intolerance, heat intolerance, polydipsia, polyphagia and polyuria.   Genitourinary:  Negative for decreased urine volume, difficulty urinating, dysuria, enuresis, flank pain, frequency, genital sores, hematuria and urgency.   Musculoskeletal:  Positive for arthralgias. Negative for back pain, gait problem, joint swelling, myalgias, neck pain and neck stiffness.   Skin:  Negative for color change, pallor, rash and wound.   Allergic/Immunologic: Negative for environmental  "allergies, food allergies and immunocompromised state.   Neurological:  Negative for dizziness, tremors, seizures, syncope, facial asymmetry, speech difficulty, weakness, light-headedness, numbness and headaches.   Hematological:  Negative for adenopathy. Does not bruise/bleed easily.   Psychiatric/Behavioral:  Negative for agitation, behavioral problems, confusion, decreased concentration, dysphoric mood, hallucinations, self-injury, sleep disturbance and suicidal ideas. The patient is not nervous/anxious and is not hyperactive.         Objective      Physical Exam  /84   Ht 160 cm (63\")   Wt 69.4 kg (153 lb)   LMP  (LMP Unknown)   BMI 27.10 kg/m²     Body mass index is 27.1 kg/m².    General:   Mental Status:  Alert   Appearance: Cooperative, in no acute distress   Build and Nutrition: Well-nourished well-developed female   Orientation: Alert and oriented to person, place and time   Posture: Normal   Gait: Nonantalgic    Integument:   Left shoulder: No skin lesions, no rash, no ecchymosis    Neurologic:   Sensation:    Left hand: Intact to light touch in the digits   Motor:  Left upper extremity: 5/5 deltoid, biceps, triceps, wrist flexors, wrist extensors, and interossei  Vascular:   Left upper extremity: 2+ radial pulse, prompt capillary refill    Upper Extremities:   Left Shoulder:    Tenderness:  None    Swelling:  None    Crepitus: None    Atrophy:  None    Range of motion:  External rotation:  30°       Forward flexion:  150°       Abduction:   140°  Instability:  None  Deformities:  None  Functional testing: Negative drop arm, negative lift-off, positive impingement      Imaging/Studies      Imaging Results (Last 24 Hours)       Procedure Component Value Units Date/Time    XR Shoulder 2+ View Left [[id number]] Resulted: 11/01/23 1043     Updated: 11/01/23 1044    Narrative:      Left Shoulder Radiographs  Indication: left shoulder pain  Views: AP, outlet and axillary views of the left " shoulder    Comparison: no prior studies available for review    Findings:  Mild glenohumeral degeneration, with small inferior humeral head spur and   small inferior glenoid spur, with no acute bony abnormalities.  Good   alignment.  No unusual bony features.              Assessment and Plan     Diagnoses and all orders for this visit:    1. Chronic left shoulder pain (Primary)  -     XR Shoulder 2+ View Left  -     MRI Shoulder Left Without Contrast; Future        1. Chronic left shoulder pain        I reviewed my findings with the patient today.  We discussed options for her left shoulder today, and she would like to proceed with further imaging with MRI.  We may consider an injection in the future if appropriate.  I will see her back after the MRI has been completed for review, but sooner for any problems.  Specifically I am looking for any evidence of rotator cuff pathology.  She has already tried a course of physical therapy without long-term improvement.    Return for After Imaging Study.      Rufino Amaya MD  11/01/23  10:51 EDT         Patient expressed no known problems or needs

## 2023-11-06 ENCOUNTER — TELEPHONE (OUTPATIENT)
Dept: INTERNAL MEDICINE | Facility: CLINIC | Age: 78
End: 2023-11-06

## 2023-11-06 NOTE — TELEPHONE ENCOUNTER
Caller: Mariaelena Easley    Relationship: Self    Best call back number: [phone]     What is the medical concern/diagnosis: BACK AND NECK ISSUES    What specialty or service is being requested: BACK/SPINE DOCTOR    What is the provider, practice or medical service name: DR. TOBIAS TSAI     What is the office location: [place]     What is the office phone number: [phone]

## 2023-11-07 DIAGNOSIS — M54.2 CHRONIC NECK PAIN: ICD-10-CM

## 2023-11-07 DIAGNOSIS — M54.50 CHRONIC BILATERAL LOW BACK PAIN WITHOUT SCIATICA: Primary | ICD-10-CM

## 2023-11-07 DIAGNOSIS — G89.29 CHRONIC BILATERAL LOW BACK PAIN WITHOUT SCIATICA: Primary | ICD-10-CM

## 2023-11-07 DIAGNOSIS — G89.29 CHRONIC NECK PAIN: ICD-10-CM

## 2023-11-07 NOTE — TELEPHONE ENCOUNTER
Mariaelena called again about this referral. She said she wants nAgel to send a referral to Dr Alex Britton, a back, neck, and spine specialist located in [place]. Please give her a call back to discuss.

## 2023-11-20 DIAGNOSIS — I10 ESSENTIAL HYPERTENSION: ICD-10-CM

## 2023-11-20 RX ORDER — AMLODIPINE BESYLATE 5 MG/1
TABLET ORAL
Qty: 90 TABLET | Refills: 1 | OUTPATIENT
Start: 2023-11-20

## 2023-11-20 RX ORDER — HYDROCHLOROTHIAZIDE 25 MG/1
TABLET ORAL
Qty: 90 TABLET | Refills: 1 | OUTPATIENT
Start: 2023-11-20

## 2023-12-09 ENCOUNTER — HOSPITAL ENCOUNTER (OUTPATIENT)
Dept: MRI IMAGING | Facility: HOSPITAL | Age: 78
Discharge: HOME | End: 2023-12-09
Payer: MEDICARE

## 2023-12-09 DIAGNOSIS — M25.512 CHRONIC LEFT SHOULDER PAIN: ICD-10-CM

## 2023-12-09 DIAGNOSIS — G89.29 CHRONIC LEFT SHOULDER PAIN: ICD-10-CM

## 2023-12-11 ENCOUNTER — TELEPHONE (OUTPATIENT)
Dept: ORTHOPEDIC SURGERY | Facility: CLINIC | Age: 78
End: 2023-12-11
Payer: MEDICARE

## 2023-12-11 NOTE — TELEPHONE ENCOUNTER
Caller: MEREDITH ERNST    Relationship to patient: SELF    Best call back number: [phone]    Chief complaint: PATIENT TRIED TO HAVE MRI OF LEFT SHOULDER DONE ON SATURDAY AND WAS UNABLE TO DO TEST DUE TO CLAUSTROPHOBIA. PATIENT IS ASKING ABOUT OPEN MRI AND ASKING FOR SEDATION.    Type of visit: MRI

## 2023-12-12 RX ORDER — DIAZEPAM 5 MG/1
5 TABLET ORAL
Qty: 1 TABLET | Refills: 0 | Status: SHIPPED | OUTPATIENT
Start: 2023-12-12 | End: 2023-12-12

## 2023-12-12 NOTE — TELEPHONE ENCOUNTER
Called patient to discuss options since she was unable to complete her MRI on 12/9 due to a panic attack. She is wondering if Dr Amaya would be willing to prescribe a mild sedative (like Valium) to help her with getting the MRI done.    Also, we would like his opinion on an Open MRI, if we were to get her scheduled somewhere that has one.

## 2023-12-13 NOTE — TELEPHONE ENCOUNTER
Called patient to inform her that Dr [facility] and that I have faxed her MRI order and authorization to [facility]. I gave her their contact info and address.

## 2023-12-15 NOTE — PROGRESS NOTES
Subjective:     Encounter Date:2023    Primary Care Physician: Huber Ortiz APRN      Patient ID: Mariaelena Easley is a 78 y.o. female.    Chief Complaint:Chest pain    PROBLEM LIST:  Chest pain  MPS, 2018: EF of 66% very small sized, mildly severe area of ischemia in the lateral wall.  Low risk study.   Stress MPS, 2020: Myocardial perfusion imaging indicates a small sized infarct located in the lateral wall with no significant ischemia noted. EF 57%.   US nonvascular extremity, 10/08/2020: 2.5 cm lipoma in the area of the palpable abnormality.   2020 MPS no significant change from 2019.  Hypertension  Dyslipidemia  Borderline diabetes  Eczema  Surgeries:  Bilateral breast augmentation  Right breast biopsy   section  Trigger point injection  Wrist surgery      Allergies   Allergen Reactions    Codeine Nausea Only and Dizziness    Sulfa Antibiotics Rash         Current Outpatient Medications:     amLODIPine (NORVASC) 5 MG tablet, TAKE 1 TABLET DAILY, Disp: 90 tablet, Rfl: 1    atorvastatin (LIPITOR) 80 MG tablet, Take 1 tablet by mouth Daily., Disp: 90 tablet, Rfl: 1    Calcium 500-100 MG-UNIT chewable tablet, Chew 1 tablet Daily., Disp: , Rfl:     Coenzyme Q10 30 MG/5ML liquid, Take 5 mL by mouth Daily., Disp: , Rfl:     diazePAM (VALIUM) 5 MG tablet, Take 1 tablet by mouth Every 6 (Six) Hours As Needed., Disp: , Rfl:     hydroCHLOROthiazide (HYDRODIURIL) 25 MG tablet, TAKE 1 TABLET DAILY, Disp: 90 tablet, Rfl: 1    Lancets (OneTouch Delica Plus [id number]) misc, USE AND DISCARD LANCET TO  CHECK BLOOD SUGAR TWO TIMESA DAY, Disp: 200 each, Rfl: 1    metFORMIN ER (GLUCOPHAGE-XR) 500 MG 24 hr tablet, Take 2 po in the am, 2 po in the pm, Disp: 360 tablet, Rfl: 3    multivitamin with minerals tablet tablet, Take 1 tablet by mouth Daily., Disp: , Rfl:     naproxen sodium (ALEVE) 220 MG tablet, Take 1 tablet by mouth 2 (Two) Times a Day As Needed., Disp: , Rfl:     OneTouch Verio test  "strip, USE TO TEST 2-3 TIMES DAILY, Disp: 300 each, Rfl: 3    rizatriptan MLT (Maxalt-MLT) 10 MG disintegrating tablet, Place 1 tablet on the tongue 1 (One) Time As Needed for Migraine for up to 1 dose. May repeat in 2 hours if needed, Disp: 9 tablet, Rfl: 1    fluticasone (FLONASE) 50 MCG/ACT nasal spray, 2 sprays into the nostril(s) as directed by provider Daily. (Patient not taking: Reported on 2023), Disp: 15.8 mL, Rfl: 0        History of Present Illness    Patient is a 78-year-old  female who presents today for annual follow-up of history of chest pain and cardiac risk factors.  Since last being seen she feels overall at her baseline.  Still has some occasional brief sharp in nature chest pain in multiple areas.  Has chronic shortness of breath that she feels is overall unchanged from last year.  No reported syncope, presyncope, or worsened edema.  Notes she is compliant with her medications.    The following portions of the patient's history were reviewed and updated as appropriate: allergies, current medications, past family history, past medical history, past social history, past surgical history and problem list.      Social History     Tobacco Use    Smoking status: Former     Packs/day: 0.50     Years: 3.00     Additional pack years: 0.00     Total pack years: 1.50     Types: Cigarettes     Start date: 1966     Quit date: 9/15/1969     Years since quittin.2    Smokeless tobacco: Never   Vaping Use    Vaping Use: Never used   Substance Use Topics    Alcohol use: Yes     Alcohol/week: 1.0 standard drink of alcohol     Types: 1 Glasses of wine per week     Comment: Very seldom.....at dinner socially...mostly not even a month    Drug use: No         ROS       Objective:   /84 (BP Location: Left arm, Patient Position: Sitting, Cuff Size: Adult)   Pulse 84   Ht 160 cm (62.99\")   Wt 72.8 kg (160 lb 9.6 oz)   LMP  (LMP Unknown)   SpO2 96%   BMI 28.46 kg/m²         Vitals " reviewed.   Constitutional:       Appearance: Well-developed and not in distress.   Neck:      Vascular: No JVD.      Trachea: No tracheal deviation.   Pulmonary:      Effort: Pulmonary effort is normal.      Breath sounds: Normal breath sounds.   Cardiovascular:      Normal rate. Regular rhythm.      Murmurs: There is no murmur.   Edema:     Peripheral edema absent.   Musculoskeletal:         General: No deformity. Skin:     General: Skin is warm and dry.   Neurological:      Mental Status: Alert and oriented to person, place, and time.         Procedures          Assessment:   Assessment & Plan      Diagnoses and all orders for this visit:    1. Chest pain, unspecified type  (Primary), overall atypical in nature.  History of low risk MPS studies.    2. Essential hypertension, mildly elevated today but has previously been controlled through the last few visits.  On amlodipine.    3. Hyperlipidemia, unspecified hyperlipidemia type, stable.  On statin.  Labs monitored by primary care.      Plan:  Patient is overall stable from cardiac standpoint.  Continue current cardiac medications.  Follow-up in 1 years time or sooner if needed.       Stella MILLER     Advance Care Planning   ACP discussion was held with the patient during this visit. Patient has an advance directive (not in EMR), copy requested.        Dictated utilizing Dragon dictation

## 2023-12-18 ENCOUNTER — OFFICE VISIT (OUTPATIENT)
Dept: CARDIOLOGY | Facility: CLINIC | Age: 78
End: 2023-12-18
Payer: MEDICARE

## 2023-12-18 VITALS
SYSTOLIC BLOOD PRESSURE: 140 MMHG | WEIGHT: 160.6 LBS | HEIGHT: 63 IN | BODY MASS INDEX: 28.46 KG/M2 | OXYGEN SATURATION: 96 % | DIASTOLIC BLOOD PRESSURE: 84 MMHG | HEART RATE: 84 BPM

## 2023-12-18 DIAGNOSIS — E78.5 HYPERLIPIDEMIA, UNSPECIFIED HYPERLIPIDEMIA TYPE: ICD-10-CM

## 2023-12-18 DIAGNOSIS — I10 ESSENTIAL HYPERTENSION: ICD-10-CM

## 2023-12-18 DIAGNOSIS — R07.9 CHEST PAIN, UNSPECIFIED TYPE: Primary | ICD-10-CM

## 2023-12-18 PROCEDURE — 3079F DIAST BP 80-89 MM HG: CPT | Performed by: NURSE PRACTITIONER

## 2023-12-18 PROCEDURE — 1159F MED LIST DOCD IN RCRD: CPT | Performed by: NURSE PRACTITIONER

## 2023-12-18 PROCEDURE — 3077F SYST BP >= 140 MM HG: CPT | Performed by: NURSE PRACTITIONER

## 2023-12-18 PROCEDURE — 99214 OFFICE O/P EST MOD 30 MIN: CPT | Performed by: NURSE PRACTITIONER

## 2023-12-18 PROCEDURE — 1160F RVW MEDS BY RX/DR IN RCRD: CPT | Performed by: NURSE PRACTITIONER

## 2023-12-18 RX ORDER — DIAZEPAM 5 MG/1
5 TABLET ORAL EVERY 6 HOURS PRN
COMMUNITY
Start: 2023-12-12

## 2023-12-22 ENCOUNTER — OFFICE VISIT (OUTPATIENT)
Dept: INTERNAL MEDICINE | Facility: CLINIC | Age: 78
End: 2023-12-22
Payer: MEDICARE

## 2023-12-22 ENCOUNTER — LAB (OUTPATIENT)
Dept: INTERNAL MEDICINE | Facility: CLINIC | Age: 78
End: 2023-12-22
Payer: MEDICARE

## 2023-12-22 VITALS
RESPIRATION RATE: 20 BRPM | TEMPERATURE: 97.3 F | HEART RATE: 76 BPM | HEIGHT: 63 IN | BODY MASS INDEX: 27.82 KG/M2 | OXYGEN SATURATION: 97 % | WEIGHT: 157 LBS | DIASTOLIC BLOOD PRESSURE: 80 MMHG | SYSTOLIC BLOOD PRESSURE: 126 MMHG

## 2023-12-22 DIAGNOSIS — E78.2 MIXED HYPERLIPIDEMIA: ICD-10-CM

## 2023-12-22 DIAGNOSIS — M85.852 OSTEOPENIA OF NECK OF LEFT FEMUR: ICD-10-CM

## 2023-12-22 DIAGNOSIS — E11.9 TYPE 2 DIABETES MELLITUS WITHOUT COMPLICATION, WITHOUT LONG-TERM CURRENT USE OF INSULIN: ICD-10-CM

## 2023-12-22 DIAGNOSIS — I10 ESSENTIAL HYPERTENSION: ICD-10-CM

## 2023-12-22 DIAGNOSIS — R73.09 HEMOGLOBIN A1C LESS THAN 7.0%: ICD-10-CM

## 2023-12-22 DIAGNOSIS — G43.909 MIGRAINE WITHOUT STATUS MIGRAINOSUS, NOT INTRACTABLE, UNSPECIFIED MIGRAINE TYPE: ICD-10-CM

## 2023-12-22 DIAGNOSIS — Z00.00 MEDICARE ANNUAL WELLNESS VISIT, SUBSEQUENT: Primary | ICD-10-CM

## 2023-12-22 LAB
25(OH)D3 SERPL-MCNC: 42.4 NG/ML (ref 30–100)
ALBUMIN SERPL-MCNC: 3.9 G/DL (ref 3.5–5.2)
ALBUMIN UR-MCNC: 1.3 MG/DL
ALBUMIN/GLOB SERPL: 1.1 G/DL
ALP SERPL-CCNC: 103 U/L (ref 39–117)
ALT SERPL W P-5'-P-CCNC: 13 U/L (ref 1–33)
ANION GAP SERPL CALCULATED.3IONS-SCNC: 13.4 MMOL/L (ref 5–15)
AST SERPL-CCNC: 19 U/L (ref 1–32)
BASOPHILS # BLD AUTO: 0.04 10*3/MM3 (ref 0–0.2)
BASOPHILS NFR BLD AUTO: 0.5 % (ref 0–1.5)
BILIRUB SERPL-MCNC: 0.6 MG/DL (ref 0–1.2)
BUN SERPL-MCNC: 20 MG/DL (ref 8–23)
BUN/CREAT SERPL: 29.4 (ref 7–25)
CALCIUM SPEC-SCNC: 10.2 MG/DL (ref 8.6–10.5)
CHLORIDE SERPL-SCNC: 96 MMOL/L (ref 98–107)
CHOLEST SERPL-MCNC: 172 MG/DL (ref 0–200)
CO2 SERPL-SCNC: 27.6 MMOL/L (ref 22–29)
CREAT SERPL-MCNC: 0.68 MG/DL (ref 0.57–1)
DEPRECATED RDW RBC AUTO: 35.7 FL (ref 37–54)
EGFRCR SERPLBLD CKD-EPI 2021: 90.4 ML/MIN/1.73
EOSINOPHIL # BLD AUTO: 0.09 10*3/MM3 (ref 0–0.4)
EOSINOPHIL NFR BLD AUTO: 1.2 % (ref 0.3–6.2)
ERYTHROCYTE [DISTWIDTH] IN BLOOD BY AUTOMATED COUNT: 12.1 % (ref 12.3–15.4)
EXPIRATION DATE: ABNORMAL
GLOBULIN UR ELPH-MCNC: 3.7 GM/DL
GLUCOSE SERPL-MCNC: 125 MG/DL (ref 65–99)
HBA1C MFR BLD: 6.8 % (ref 4.5–5.7)
HCT VFR BLD AUTO: 39.5 % (ref 34–46.6)
HDLC SERPL-MCNC: 63 MG/DL (ref 40–60)
HGB BLD-MCNC: 12.8 G/DL (ref 12–15.9)
IMM GRANULOCYTES # BLD AUTO: 0.02 10*3/MM3 (ref 0–0.05)
IMM GRANULOCYTES NFR BLD AUTO: 0.3 % (ref 0–0.5)
LDLC SERPL CALC-MCNC: 97 MG/DL (ref 0–100)
LDLC/HDLC SERPL: 1.53 {RATIO}
LYMPHOCYTES # BLD AUTO: 2.59 10*3/MM3 (ref 0.7–3.1)
LYMPHOCYTES NFR BLD AUTO: 34.8 % (ref 19.6–45.3)
Lab: ABNORMAL
MCH RBC QN AUTO: 27.3 PG (ref 26.6–33)
MCHC RBC AUTO-ENTMCNC: 32.4 G/DL (ref 31.5–35.7)
MCV RBC AUTO: 84.2 FL (ref 79–97)
MONOCYTES # BLD AUTO: 0.56 10*3/MM3 (ref 0.1–0.9)
MONOCYTES NFR BLD AUTO: 7.5 % (ref 5–12)
NEUTROPHILS NFR BLD AUTO: 4.15 10*3/MM3 (ref 1.7–7)
NEUTROPHILS NFR BLD AUTO: 55.7 % (ref 42.7–76)
NRBC BLD AUTO-RTO: 0 /100 WBC (ref 0–0.2)
PLATELET # BLD AUTO: 366 10*3/MM3 (ref 140–450)
PMV BLD AUTO: 9.9 FL (ref 6–12)
POTASSIUM SERPL-SCNC: 3.4 MMOL/L (ref 3.5–5.2)
PROT SERPL-MCNC: 7.6 G/DL (ref 6–8.5)
RBC # BLD AUTO: 4.69 10*6/MM3 (ref 3.77–5.28)
SODIUM SERPL-SCNC: 137 MMOL/L (ref 136–145)
TRIGL SERPL-MCNC: 63 MG/DL (ref 0–150)
TSH SERPL DL<=0.05 MIU/L-ACNC: 1.43 UIU/ML (ref 0.27–4.2)
VLDLC SERPL-MCNC: 12 MG/DL (ref 5–40)
WBC NRBC COR # BLD AUTO: 7.45 10*3/MM3 (ref 3.4–10.8)

## 2023-12-22 PROCEDURE — 85025 COMPLETE CBC W/AUTO DIFF WBC: CPT | Performed by: NURSE PRACTITIONER

## 2023-12-22 PROCEDURE — 84443 ASSAY THYROID STIM HORMONE: CPT | Performed by: NURSE PRACTITIONER

## 2023-12-22 PROCEDURE — 80053 COMPREHEN METABOLIC PANEL: CPT | Performed by: NURSE PRACTITIONER

## 2023-12-22 PROCEDURE — 80061 LIPID PANEL: CPT | Performed by: NURSE PRACTITIONER

## 2023-12-22 PROCEDURE — 82306 VITAMIN D 25 HYDROXY: CPT | Performed by: NURSE PRACTITIONER

## 2023-12-22 PROCEDURE — 82043 UR ALBUMIN QUANTITATIVE: CPT | Performed by: NURSE PRACTITIONER

## 2023-12-22 NOTE — PROGRESS NOTES
The ABCs of the Annual Wellness Visit  Subsequent Medicare Wellness Visit    Chief Complaint   Patient presents with    Medicare Wellness-subsequent       Subjective   History of Present Illness:  Mariaelena Easley is a 78 y.o. female who presents for a Subsequent Medicare Wellness Visit.    HEALTH RISK ASSESSMENT    Recent Hospitalizations:  No hospitalization(s) within the last year.    Current Medical Providers:  Patient Care Team:  Huber Ortiz APRN as PCP - General (Family Medicine)  Stella Jerez APRN as Nurse Practitioner (Cardiology)    Smoking Status:  Social History     Tobacco Use   Smoking Status Former    Packs/day: 0.50    Years: 3.00    Additional pack years: 0.00    Total pack years: 1.50    Types: Cigarettes    Start date: 1966    Quit date: 9/15/1969    Years since quittin.3   Smokeless Tobacco Never       Alcohol Consumption:  Social History     Substance and Sexual Activity   Alcohol Use Yes    Alcohol/week: 1.0 standard drink of alcohol    Types: 1 Glasses of wine per week    Comment: Very seldom.....at dinner socially...mostly not even a month       Depression Screen:       2023     2:35 PM   PHQ-2/PHQ-9 Depression Screening   Little Interest or Pleasure in Doing Things 0-->not at all   Feeling Down, Depressed or Hopeless 0-->not at all   PHQ-9: Brief Depression Severity Measure Score 0       Fall Risk Screen:  MAY Fall Risk Assessment was completed, and patient is at HIGH risk for falls. Assessment completed on:2023    Health Habits and Functional and Cognitive Screenin/22/2023     2:36 PM   Functional & Cognitive Status   Do you have difficulty preparing food and eating? No   Do you have difficulty bathing yourself, getting dressed or grooming yourself? No   Do you have difficulty using the toilet? No   Do you have difficulty moving around from place to place? No   Do you have trouble with steps or getting out of a bed or a chair? No   Current  Diet Well Balanced Diet   Exercise (times per week) 0 times per week   Current Exercises Include No Regular Exercise   Do you need help using the phone?  No   Are you deaf or do you have serious difficulty hearing?  No   Do you need help to go to places out of walking distance? No   Do you need help shopping? No   Do you need help preparing meals?  No   Do you need help with housework?  No   Do you need help with laundry? No   Do you need help taking your medications? No   Do you need help managing money? No   Do you ever drive or ride in a car without wearing a seat belt? No   Have you felt unusual stress, anger or loneliness in the last month? No   Who do you live with? Spouse   If you need help, do you have trouble finding someone available to you? No   Have you been bothered in the last four weeks by sexual problems? No   Do you have difficulty concentrating, remembering or making decisions? No         Does the patient have evidence of cognitive impairment? No    Asprin use counseling:Does not need ASA (and currently is not on it)    Age-appropriate Screening Schedule:  Refer to the list below for future screening recommendations based on patient's age, sex and/or medical conditions. Orders for these recommended tests are listed in the plan section. The patient has been provided with a written plan.    Health Maintenance   Topic Date Due    ZOSTER VACCINE (2 of 2) 02/26/2012    URINE MICROALBUMIN  12/21/2022    COVID-19 Vaccine (6 - 2023-24 season) 09/01/2023    LIPID PANEL  12/28/2023    TDAP/TD VACCINES (3 - Td or Tdap) 06/01/2024    BMI FOLLOWUP  06/19/2024    HEMOGLOBIN A1C  06/22/2024    DXA SCAN  10/11/2024    DIABETIC EYE EXAM  11/01/2024    ANNUAL WELLNESS VISIT  12/22/2024    COLORECTAL CANCER SCREENING  06/17/2026    HEPATITIS C SCREENING  Completed    INFLUENZA VACCINE  Completed    Pneumococcal Vaccine 65+  Completed          The following portions of the patient's history were reviewed and updated  as appropriate: allergies, current medications, past family history, past medical history, past social history, past surgical history, and problem list.    Outpatient Medications Prior to Visit   Medication Sig Dispense Refill    amLODIPine (NORVASC) 5 MG tablet TAKE 1 TABLET DAILY 90 tablet 1    atorvastatin (LIPITOR) 80 MG tablet Take 1 tablet by mouth Daily. 90 tablet 1    Calcium 500-100 MG-UNIT chewable tablet Chew 1 tablet Daily.      Coenzyme Q10 30 MG/5ML liquid Take 5 mL by mouth Daily.      diazePAM (VALIUM) 5 MG tablet Take 1 tablet by mouth Every 6 (Six) Hours As Needed.      hydroCHLOROthiazide (HYDRODIURIL) 25 MG tablet TAKE 1 TABLET DAILY 90 tablet 1    Lancets (OneTouch Delica Plus [id number]) misc USE AND DISCARD LANCET TO  CHECK BLOOD SUGAR TWO TIMESA  each 1    metFORMIN ER (GLUCOPHAGE-XR) 500 MG 24 hr tablet Take 2 po in the am, 2 po in the pm 360 tablet 3    multivitamin with minerals tablet tablet Take 1 tablet by mouth Daily.      naproxen sodium (ALEVE) 220 MG tablet Take 1 tablet by mouth 2 (Two) Times a Day As Needed.      OneTouch Verio test strip USE TO TEST 2-3 TIMES DAILY 300 each 3    rizatriptan MLT (Maxalt-MLT) 10 MG disintegrating tablet Place 1 tablet on the tongue 1 (One) Time As Needed for Migraine for up to 1 dose. May repeat in 2 hours if needed 9 tablet 1    fluticasone (FLONASE) 50 MCG/ACT nasal spray 2 sprays into the nostril(s) as directed by provider Daily. (Patient not taking: Reported on 12/18/2023) 15.8 mL 0     No facility-administered medications prior to visit.       Patient Active Problem List   Diagnosis    Anemia    Hyperlipidemia    Migraine headache    Thyroid disease    Episodic recurrent vertigo    Osteopenia    Seborrheic eczema    Cyst of uterus    Cyst of breast    Essential hypertension    Diabetes mellitus    Squamous cell skin cancer, face    Chest pain       Advanced Care Planning:  ACP discussion was held with the patient during this visit.  "Patient has an advance directive (not in EMR), copy requested.    Review of Systems   Constitutional:  Negative for activity change, appetite change and fatigue.   HENT:  Negative for congestion.    Respiratory:  Negative for cough and shortness of breath.    Cardiovascular:  Negative for chest pain and leg swelling.   Gastrointestinal:  Negative for abdominal pain.   Musculoskeletal:  Positive for arthralgias, back pain and neck pain.   Neurological:  Positive for headache. Negative for dizziness, weakness and confusion.   Psychiatric/Behavioral:  Negative for behavioral problems and decreased concentration. The patient is nervous/anxious.        Compared to one year ago, the patient feels her physical health is worse.  Compared to one year ago, the patient feels her mental health is the same.    Reviewed chart for potential of high risk medication in the elderly: yes  Reviewed chart for potential of harmful drug interactions in the elderly:yes    Objective         Vitals:    12/22/23 1429   BP: 126/80   BP Location: Left arm   Patient Position: Sitting   Cuff Size: Adult   Pulse: 76   Resp: 20   Temp: 97.3 °F (36.3 °C)   TempSrc: Tympanic   SpO2: 97%   Weight: 71.2 kg (157 lb)   Height: 160 cm (63\")   PainSc:   9   PainLoc: Shoulder  Comment: and back       Body mass index is 27.81 kg/m².  Discussed the patient's BMI with her. The BMI is above average; BMI management plan is completed.    Physical Exam  Vitals and nursing note reviewed.   HENT:      Head: Normocephalic.   Eyes:      Pupils: Pupils are equal, round, and reactive to light.   Neck:      Thyroid: No thyromegaly.   Cardiovascular:      Rate and Rhythm: Normal rate and regular rhythm.      Pulses: Normal pulses.      Heart sounds: Normal heart sounds.   Pulmonary:      Effort: Pulmonary effort is normal.      Breath sounds: Normal breath sounds.   Skin:     General: Skin is warm and dry.      Capillary Refill: Capillary refill takes less than 2 seconds. "   Neurological:      General: No focal deficit present.      Mental Status: She is alert and oriented to person, place, and time.      Gait: Gait is intact.   Psychiatric:         Attention and Perception: Attention normal.         Mood and Affect: Mood normal.         Behavior: Behavior normal.         Thought Content: Thought content normal.         Judgment: Judgment normal.         Lab Results   Component Value Date    HGBA1C 6.8 (A) 12/22/2023        Assessment & Plan   Medicare Risks and Personalized Health Plan  CMS Preventative Services Quick Reference  Advance Directive Discussion  Breast Cancer/Mammogram Screening  Cardiovascular risk  Chronic Pain   Colon Cancer Screening  Diabetic Lab Screening   Fall Risk  Obesity/Overweight   Osteoporosis Risk    The above risks/problems have been discussed with the patient.  Pertinent information has been shared with the patient in the After Visit Summary.  Follow up plans and orders are seen below in the Assessment/Plan Section.    Diagnoses and all orders for this visit:    1. Medicare annual wellness visit, subsequent (Primary)  -     CBC & Differential  -     Comprehensive Metabolic Panel  -     TSH Rfx On Abnormal To Free T4    2. Type 2 diabetes mellitus without complication, without long-term current use of insulin  -     MicroAlbumin, Urine, Random - Urine, Clean Catch; Future  -     POC Glycosylated Hemoglobin (Hb A1C)    3. Mixed hyperlipidemia  -     Lipid Panel    4. Essential hypertension    5. Migraine without status migrainosus, not intractable, unspecified migraine type    6. Osteopenia of neck of left femur  -     Vitamin D,25-Hydroxy    7. Hemoglobin A1c less than 7.0%      Colonoscopy due in 2027  Mammo due to Mauri  Check labs  DM well controlled on Metformin  HTN stable on Amlodipine/HCTZ  Migraines stable    Follow Up:  Return in about 6 months (around 6/22/2024) for Diabetes.     An After Visit Summary and PPPS were given to the patient.

## 2023-12-29 ENCOUNTER — TRANSCRIBE ORDERS (OUTPATIENT)
Dept: ADMINISTRATIVE | Facility: HOSPITAL | Age: 78
End: 2023-12-29
Payer: MEDICARE

## 2023-12-29 DIAGNOSIS — Z12.31 VISIT FOR SCREENING MAMMOGRAM: Primary | ICD-10-CM

## 2024-01-22 DIAGNOSIS — E78.5 HYPERLIPIDEMIA, UNSPECIFIED HYPERLIPIDEMIA TYPE: ICD-10-CM

## 2024-01-22 DIAGNOSIS — E11.9 TYPE 2 DIABETES MELLITUS WITHOUT COMPLICATION, WITHOUT LONG-TERM CURRENT USE OF INSULIN: ICD-10-CM

## 2024-01-22 DIAGNOSIS — I10 ESSENTIAL HYPERTENSION: ICD-10-CM

## 2024-01-22 RX ORDER — HYDROCHLOROTHIAZIDE 25 MG/1
25 TABLET ORAL DAILY
Qty: 90 TABLET | Refills: 1 | Status: SHIPPED | OUTPATIENT
Start: 2024-01-22

## 2024-01-22 RX ORDER — ATORVASTATIN CALCIUM 80 MG/1
80 TABLET, FILM COATED ORAL DAILY
Qty: 90 TABLET | Refills: 1 | Status: SHIPPED | OUTPATIENT
Start: 2024-01-22

## 2024-01-22 RX ORDER — METFORMIN HYDROCHLORIDE 500 MG/1
TABLET, EXTENDED RELEASE ORAL
Qty: 360 TABLET | Refills: 3 | Status: SHIPPED | OUTPATIENT
Start: 2024-01-22

## 2024-01-22 RX ORDER — AMLODIPINE BESYLATE 5 MG/1
5 TABLET ORAL DAILY
Qty: 90 TABLET | Refills: 1 | Status: SHIPPED | OUTPATIENT
Start: 2024-01-22

## 2024-01-22 NOTE — TELEPHONE ENCOUNTER
Caller: Mariaelena Easley    Relationship: Self    Best call back number: [phone]    Requested Prescriptions:   Requested Prescriptions     Pending Prescriptions Disp Refills    metFORMIN ER (GLUCOPHAGE-XR) 500 MG 24 hr tablet 360 tablet 3     Sig: Take 2 po in the am, 2 po in the pm    atorvastatin (LIPITOR) 80 MG tablet 90 tablet 1     Sig: Take 1 tablet by mouth Daily.    hydroCHLOROthiazide (HYDRODIURIL) 25 MG tablet 90 tablet 1     Sig: Take 1 tablet by mouth Daily.    amLODIPine (NORVASC) 5 MG tablet 90 tablet 1     Sig: Take 1 tablet by mouth Daily.        Pharm[facility] where request [facility] be sent[facility] [place] - [address] AT [address]AL TO [facility] - [phone]  - [phone] FX     Last office visit with prescribing clinician: 12/22/2023   Last telemedicine visit with prescribing clinician: Visit date not found   Next office visit with prescribing clinician: 6/21/2024     Does the patient have less than a 3 day supply:  [x] Yes  [] No    Would you like a call back once the refill request has been completed: [] Yes [x] No    If the office needs to give you a call back, can they leave a voicemail: [] Yes [x] No    Estella Kirkland Rep   01/22/24 11:23 EST

## 2024-01-22 NOTE — TELEPHONE ENCOUNTER
Caller: Mariaelena Easley    Relationship: Self    Best call back number: [phone]     Requested Prescriptions:   Requested Prescriptions     Pending Prescriptions Disp Refills    metFORMIN ER (GLUCOPHAGE-XR) 500 MG 24 hr tablet 360 tablet 3     Sig: Take 2 po in the am, 2 po in the pm    atorvastatin (LIPITOR) 80 MG tablet 90 tablet 1     Sig: Take 1 tablet by mouth Daily.    hydroCHLOROthiazide (HYDRODIURIL) 25 MG tablet 90 tablet 1     Sig: Take 1 tablet by mouth Daily.    amLODIPine (NORVASC) 5 MG tablet 90 tablet 1     Sig: Take 1 tablet by mouth Daily.        Pharmacy where request should be sent: [facility], [address] [phone] [facility] [phone]      Last office visit with prescribing clinician: 12/22/2023   Last telemedicine visit with prescribing clinician: Visit date not found   Next office visit with prescribing clinician: 6/21/2024     Additional details provided by patient:   PAT[facility]     Does the patient have less than a 3 day supply:  [x] Yes  [] No    Would you like a call back once the refill request has been completed: [] Yes [x] No    If the office needs to give you a call back, can they leave a voicemail: [] Yes [x] No    Estella Sharma Rep   01/22/24 11:39 EST

## 2024-02-28 ENCOUNTER — OFFICE VISIT (OUTPATIENT)
Dept: ORTHOPEDIC SURGERY | Facility: CLINIC | Age: 79
End: 2024-02-28
Payer: MEDICARE

## 2024-02-28 VITALS
HEIGHT: 63 IN | DIASTOLIC BLOOD PRESSURE: 80 MMHG | WEIGHT: 153.8 LBS | SYSTOLIC BLOOD PRESSURE: 132 MMHG | BODY MASS INDEX: 27.25 KG/M2

## 2024-02-28 DIAGNOSIS — M75.102 ROTATOR CUFF SYNDROME, LEFT: Primary | ICD-10-CM

## 2024-02-28 RX ORDER — CLINDAMYCIN PHOSPHATE 11.9 MG/ML
SOLUTION TOPICAL
COMMUNITY
Start: 2024-02-16

## 2024-02-28 RX ORDER — ROPIVACAINE HYDROCHLORIDE 5 MG/ML
4 INJECTION, SOLUTION EPIDURAL; INFILTRATION; PERINEURAL
Status: COMPLETED | OUTPATIENT
Start: 2024-02-28 | End: 2024-02-28

## 2024-02-28 RX ORDER — TRIAMCINOLONE ACETONIDE 40 MG/ML
40 INJECTION, SUSPENSION INTRA-ARTICULAR; INTRAMUSCULAR
Status: COMPLETED | OUTPATIENT
Start: 2024-02-28 | End: 2024-02-28

## 2024-02-28 RX ADMIN — TRIAMCINOLONE ACETONIDE 40 MG: 40 INJECTION, SUSPENSION INTRA-ARTICULAR; INTRAMUSCULAR at 15:21

## 2024-02-28 RX ADMIN — ROPIVACAINE HYDROCHLORIDE 4 ML: 5 INJECTION, SOLUTION EPIDURAL; INFILTRATION; PERINEURAL at 15:21

## 2024-02-28 NOTE — PROGRESS NOTES
[facility] Clinic Note    Subjective     Chief Complaint   Patient presents with    Follow-up     3 month follow up - Chronic left shoulder pain        HPI    It has been 3  month(s) since Ms. Easley's last visit. She returns to clinic today for follow-up of left shoulder pain. The issue has been ongoing for 1 year(s). She rates her pain a 5/10 on the pain scale. Previous/current treatments: physical therapy. Current symptoms: popping. The pain is worse with sleeping and lying on affected side; pain medication and/or NSAID improve the pain. Overall, she is doing the same.  Here today to review the MRI results.  No new complaints.  Still having difficulty sleeping on that shoulder.    I have reviewed the following portions of the patient's history and agree with: History of Present Illness and Review of Systems    Patient Active Problem List   Diagnosis    Anemia    Hyperlipidemia    Migraine headache    Thyroid disease    Episodic recurrent vertigo    Osteopenia    Seborrheic eczema    Cyst of uterus    Cyst of breast    Essential hypertension    Diabetes mellitus    Squamous cell skin cancer, face    Chest pain     Past Medical History:   Diagnosis Date    Arthritis ?    fingers    Cancer     Cataract 20??    haven't had surgery yet.    CTS (carpal tunnel syndrome)     Cyst of uterus     Diabetes mellitus     Eczema     Headache 199??    Silent Migraines infrequent    Hyperlipidemia     Hypertension     Low back pain 202??    scheduled to see Dr. Britton for back 2024    Low back strain presently - off & on    Rotator cuff syndrome presently    Seborrheic dermatitis     Thyroid disease       Past Surgical History:   Procedure Laterality Date    AUGMENTATION MAMMAPLASTY Bilateral     BREAST EXCISIONAL BIOPSY Right      SECTION      COLONOSCOPY  ??    Dr. Thapa     HAND SURGERY  's    TRIGGER POINT INJECTION  's    WRIST SURGERY  's      Family History    Problem Relation Age of Onset    Heart disease Mother     Arthritis Father     Heart attack Father     Skin cancer Father     COPD Father     Cancer Father         Skin    Lung cancer Sister     Myelodysplastic syndrome Sister     Broken bones Sister         fell off a ladder    Cancer Sister         Lower Lobe Left Lung    Diabetes Brother     Heart attack Brother     Hyperlipidemia Brother     Cancer Brother         Mouth & Neck/[employer] [place]    Rheumatologic disease Paternal Grandmother         many years in wheelchair     Early death Brother         SIDS?? 1 day old     Early death Brother         Swimming accident @ 19 years old     Breast cancer Neg Hx     Ovarian cancer Neg Hx      Social History     Socioeconomic History    Marital status:    Tobacco Use    Smoking status: Former     Packs/day: 0.50     Years: 3.00     Additional pack years: 0.00     Total pack years: 1.50     Types: Cigarettes     Start date: 1966     Quit date: 9/15/1969     Years since quittin.4    Smokeless tobacco: Never   Vaping Use    Vaping Use: Never used   Substance and Sexual Activity    Alcohol use: Yes     Alcohol/week: 1.0 standard drink of alcohol     Types: 1 Glasses of wine per week     Comment: Very seldom.....at dinner socially...mostly not even a month    Drug use: No    Sexual activity: Not Currently     Partners: Male     Comment: IUD - Failed, had son   had vasectomy.      Current Outpatient Medications on File Prior to Visit   Medication Sig Dispense Refill    amLODIPine (NORVASC) 5 MG tablet Take 1 tablet by mouth Daily. 90 tablet 1    atorvastatin (LIPITOR) 80 MG tablet Take 1 tablet by mouth Daily. 90 tablet 1    Calcium 500-100 MG-UNIT chewable tablet Chew 1 tablet Daily.      clindamycin (CLEOCIN T) 1 % external solution APPLY TOPICALLY TO THE AFFECTED AREA EVERY DAY      Coenzyme Q10 30 MG/5ML liquid Take 5 mL by mouth Daily.      diazePAM (VALIUM) 5 MG  tablet Take 1 tablet by mouth Every 6 (Six) Hours As Needed.      hydroCHLOROthiazide (HYDRODIURIL) 25 MG tablet Take 1 tablet by mouth Daily. 90 tablet 1    Lancets (OneTouch Delica Plus [id number]) misc USE AND DISCARD LANCET TO  CHECK BLOOD SUGAR TWO TIMESA  each 1    metFORMIN ER (GLUCOPHAGE-XR) 500 MG 24 hr tablet Take 2 po in the am, 2 po in the pm 360 tablet 3    multivitamin with minerals tablet tablet Take 1 tablet by mouth Daily.      naproxen sodium (ALEVE) 220 MG tablet Take 1 tablet by mouth 2 (Two) Times a Day As Needed.      OneTouch Verio test strip USE TO TEST 2-3 TIMES DAILY 300 each 3    rizatriptan MLT (Maxalt-MLT) 10 MG disintegrating tablet Place 1 tablet on the tongue 1 (One) Time As Needed for Migraine for up to 1 dose. May repeat in 2 hours if needed 9 tablet 1    fluticasone (FLONASE) 50 MCG/ACT nasal spray 2 sprays into the nostril(s) as directed by provider Daily. (Patient not taking: Reported on 12/18/2023) 15.8 mL 0     No current facility-administered medications on file prior to visit.      Allergies   Allergen Reactions    Codeine Nausea Only and Dizziness    Sulfa Antibiotics Rash        Review of Systems   Constitutional:  Negative for activity change, appetite change, chills, diaphoresis, fatigue, fever and unexpected weight change.   HENT:  Negative for congestion, dental problem, drooling, ear discharge, ear pain, facial swelling, hearing loss, mouth sores, nosebleeds, postnasal drip, rhinorrhea, sinus pressure, sneezing, sore throat, tinnitus, trouble swallowing and voice change.    Eyes:  Negative for photophobia, pain, discharge, redness, itching and visual disturbance.   Respiratory:  Negative for apnea, cough, choking, chest tightness, shortness of breath, wheezing and stridor.    Cardiovascular:  Negative for chest pain, palpitations and leg swelling.   Gastrointestinal:  Negative for abdominal distention, abdominal pain, anal bleeding, blood in stool, constipation,  "diarrhea, nausea, rectal pain and vomiting.   Endocrine: Negative for cold intolerance, heat intolerance, polydipsia, polyphagia and polyuria.   Genitourinary:  Negative for decreased urine volume, difficulty urinating, dysuria, enuresis, flank pain, frequency, genital sores, hematuria and urgency.   Musculoskeletal:  Positive for arthralgias. Negative for back pain, gait problem, joint swelling, myalgias, neck pain and neck stiffness.   Skin:  Negative for color change, pallor, rash and wound.   Allergic/Immunologic: Negative for environmental allergies, food allergies and immunocompromised state.   Neurological:  Negative for dizziness, tremors, seizures, syncope, facial asymmetry, speech difficulty, weakness, light-headedness, numbness and headaches.   Hematological:  Negative for adenopathy. Does not bruise/bleed easily.   Psychiatric/Behavioral:  Negative for agitation, behavioral problems, confusion, decreased concentration, dysphoric mood, hallucinations, self-injury, sleep disturbance and suicidal ideas. The patient is not nervous/anxious and is not hyperactive.         Objective      Physical Exam  /80   Ht 160 cm (62.99\")   Wt 69.8 kg (153 lb 12.8 oz)   LMP  (LMP Unknown)   BMI 27.25 kg/m²     Body mass index is 27.25 kg/m².    General:   Mental Status:  Alert   Appearance: Cooperative, in no acute distress   Build and Nutrition: Well-nourished well-developed female   Orientation: Alert and oriented to person, place and time   Posture: Normal   Gait: Nonantalgic    Upper Extremities:              Left Shoulder:                          Tenderness:    None                          Swelling:          None                          Crepitus:          None                          Atrophy:           None                          Range of motion:        External rotation:         30°                                                              Forward flexion:          150°                              "                                 Abduction:                   140°  Instability:        None  Deformities:     None  Functional testing: Negative drop arm, negative lift-off, positive impingement    Imaging/Studies  Imaging Results (Last 24 Hours)       ** No results found for the last 24 hours. **          MRI of the left shoulder from 12/21/2023 from [facility] was reviewed, which showed tendinosis of the rotator cuff, possible small superior labral tear, and 2 small geodes in the glenoid.    Assessment and Plan     Diagnoses and all orders for this visit:    1. Rotator cuff syndrome, left (Primary)  -     - Large Joint Arthrocentesis: L subacromial bursa        1. Rotator cuff syndrome, left        Reviewed my findings with the patient.  We discussed options today, and she would like to proceed with a subacromial injection on her left shoulder, and she is going to continue with her shoulder exercises that she did with physical therapy previously.  I will see her back in 2 months to ensure improvement, but sooner for any problems.    Procedure Note:  The potential benefits of performing a therapeutic left shoulder subacromial bursal injection, as well as potential risks (including, but not limited to infection, swelling, pain, bleeding, bruising, nerve/blood vessel damage, skin color changes, transient elevation in blood glucose levels, and fat atrophy) were discussed with the patient.  After informed consent, timeout procedure was performed, and the skin on the left shoulder was prepped with chlorhexidine soap and alcohol, after which ethyl chloride was applied to the skin at the injection site. Via the posterior approach, 1ml of Kenalog 40mg/ml mixed with 4ml 0.5% ropivacaine plain was injected into the subacromial bursa.  The patient tolerated the procedure well, experiencing 40% improvement a few minutes following the injection. There were no complications.  Band-Aid was applied to the injection site.  Post-procedural instructions were given to the patient and/or their caregiver.      Return in about 2 months (around 4/28/2024).      Rufino Amaya MD  02/28/24  15:51 EST    Dictated Utilizing Drag a subacromial injectionon Dictation

## 2024-02-28 NOTE — PROGRESS NOTES
Procedure   - Large Joint Arthrocentesis: L subacromial bursa on 2/28/2024 3:21 PM  Indications: pain  Details: 21 G needle, posterior approach  Medications: 40 mg triamcinolone acetonide 40 MG/ML; 4 mL ropivacaine 0.5 %  Outcome: tolerated well, no immediate complications  Procedure, treatment alternatives, risks and benefits explained, specific risks discussed. Consent was given by the patient. Immediately prior to procedure a time out was called to verify the correct patient, procedure, equipment, support staff and site/side marked as required. Patient was prepped and draped in the usual sterile fashion.

## 2024-03-11 ENCOUNTER — HOSPITAL ENCOUNTER (OUTPATIENT)
Dept: MAMMOGRAPHY | Facility: HOSPITAL | Age: 79
Discharge: HOME | End: 2024-03-11
Admitting: NURSE PRACTITIONER
Payer: MEDICARE

## 2024-03-11 DIAGNOSIS — Z12.31 VISIT FOR SCREENING MAMMOGRAM: ICD-10-CM

## 2024-03-11 PROCEDURE — 77063 BREAST TOMOSYNTHESIS BI: CPT

## 2024-03-11 PROCEDURE — 77067 SCR MAMMO BI INCL CAD: CPT

## 2024-05-06 ENCOUNTER — OFFICE VISIT (OUTPATIENT)
Dept: ORTHOPEDIC SURGERY | Facility: CLINIC | Age: 79
End: 2024-05-06
Payer: MEDICARE

## 2024-05-06 VITALS
SYSTOLIC BLOOD PRESSURE: 108 MMHG | HEIGHT: 63 IN | WEIGHT: 156 LBS | DIASTOLIC BLOOD PRESSURE: 78 MMHG | BODY MASS INDEX: 27.64 KG/M2

## 2024-05-06 DIAGNOSIS — M75.102 ROTATOR CUFF SYNDROME, LEFT: Primary | ICD-10-CM

## 2024-05-06 PROCEDURE — 3078F DIAST BP <80 MM HG: CPT | Performed by: ORTHOPAEDIC SURGERY

## 2024-05-06 PROCEDURE — 3074F SYST BP LT 130 MM HG: CPT | Performed by: ORTHOPAEDIC SURGERY

## 2024-05-06 PROCEDURE — 1160F RVW MEDS BY RX/DR IN RCRD: CPT | Performed by: ORTHOPAEDIC SURGERY

## 2024-05-06 PROCEDURE — 1159F MED LIST DOCD IN RCRD: CPT | Performed by: ORTHOPAEDIC SURGERY

## 2024-05-06 PROCEDURE — 20610 DRAIN/INJ JOINT/BURSA W/O US: CPT | Performed by: ORTHOPAEDIC SURGERY

## 2024-05-06 RX ORDER — ROPIVACAINE HYDROCHLORIDE 5 MG/ML
4 INJECTION, SOLUTION EPIDURAL; INFILTRATION; PERINEURAL
Status: COMPLETED | OUTPATIENT
Start: 2024-05-06 | End: 2024-05-06

## 2024-05-06 RX ORDER — TRIAMCINOLONE ACETONIDE 40 MG/ML
40 INJECTION, SUSPENSION INTRA-ARTICULAR; INTRAMUSCULAR
Status: COMPLETED | OUTPATIENT
Start: 2024-05-06 | End: 2024-05-06

## 2024-05-06 RX ADMIN — TRIAMCINOLONE ACETONIDE 40 MG: 40 INJECTION, SUSPENSION INTRA-ARTICULAR; INTRAMUSCULAR at 15:40

## 2024-05-06 RX ADMIN — ROPIVACAINE HYDROCHLORIDE 4 ML: 5 INJECTION, SOLUTION EPIDURAL; INFILTRATION; PERINEURAL at 15:40

## 2024-05-22 ENCOUNTER — OFFICE VISIT (OUTPATIENT)
Dept: INTERNAL MEDICINE | Facility: CLINIC | Age: 79
End: 2024-05-22
Payer: MEDICARE

## 2024-05-22 VITALS
TEMPERATURE: 98.6 F | WEIGHT: 156 LBS | HEIGHT: 63 IN | OXYGEN SATURATION: 97 % | HEART RATE: 92 BPM | BODY MASS INDEX: 27.64 KG/M2 | RESPIRATION RATE: 16 BRPM | DIASTOLIC BLOOD PRESSURE: 76 MMHG | SYSTOLIC BLOOD PRESSURE: 128 MMHG

## 2024-05-22 DIAGNOSIS — K11.21 PAROTITIS, ACUTE: Primary | ICD-10-CM

## 2024-05-22 DIAGNOSIS — H65.01 NON-RECURRENT ACUTE SEROUS OTITIS MEDIA OF RIGHT EAR: ICD-10-CM

## 2024-05-22 PROCEDURE — 3078F DIAST BP <80 MM HG: CPT | Performed by: INTERNAL MEDICINE

## 2024-05-22 PROCEDURE — 3074F SYST BP LT 130 MM HG: CPT | Performed by: INTERNAL MEDICINE

## 2024-05-22 PROCEDURE — 1125F AMNT PAIN NOTED PAIN PRSNT: CPT | Performed by: INTERNAL MEDICINE

## 2024-05-22 PROCEDURE — 99213 OFFICE O/P EST LOW 20 MIN: CPT | Performed by: INTERNAL MEDICINE

## 2024-05-22 RX ORDER — GUAIFENESIN AND DEXTROMETHORPHAN HYDROBROMIDE 600; 30 MG/1; MG/1
1 TABLET, EXTENDED RELEASE ORAL 2 TIMES DAILY
Qty: 30 TABLET | Refills: 0 | Status: SHIPPED | OUTPATIENT
Start: 2024-05-22

## 2024-05-22 RX ORDER — AMOXICILLIN AND CLAVULANATE POTASSIUM 875; 125 MG/1; MG/1
1 TABLET, FILM COATED ORAL 2 TIMES DAILY
Qty: 20 TABLET | Refills: 0 | Status: SHIPPED | OUTPATIENT
Start: 2024-05-22

## 2024-05-22 NOTE — PROGRESS NOTES
Follow Up Office Visit      Date: 2024   Patient Name: Mariaelena Easley  : 1945   MRN: [id number]     Chief Complaint:    Chief Complaint   Patient presents with    Earache     R ear/r side of jaw painful and swollen       History of Present Illness: Mariaelena Easley is a 78 y.o. female who is here today to follow up with right sided face ear pain that started Monday night.  Has had increased warmness to right side of jaw.  Swelling to right side of face near parotid.       Subjective      Past Medical History:   Diagnosis Date    Arthritis ?    fingers    Cancer     Cataract 20??    haven't had surgery yet.    CTS (carpal tunnel syndrome)     Cyst of uterus     Diabetes mellitus     Eczema     Headache ??    Silent Migraines infrequent    Hyperlipidemia     Hypertension     Low back pain ??    scheduled to see Dr. Britton for back 2024    Low back strain presently - off & on    Rotator cuff syndrome presently    Seborrheic dermatitis     Thyroid disease        Past Surgical History:   Procedure Laterality Date    AUGMENTATION MAMMAPLASTY Bilateral 1968    BREAST EXCISIONAL BIOPSY Right      SECTION      COLONOSCOPY  ??    Dr. Thapa     HAND SURGERY  's    TRIGGER POINT INJECTION  's    WRIST SURGERY  's       Family History   Problem Relation Age of Onset    Heart disease Mother     Arthritis Father     Heart attack Father     Skin cancer Father     COPD Father     Cancer Father         Skin    Lung cancer Sister     Myelodysplastic syndrome Sister     Broken bones Sister         fell off a ladder    Cancer Sister         Lower Lobe Left Lung    Diabetes Brother     Heart attack Brother     Hyperlipidemia Brother     Cancer Brother         Mouth & Neck/Agent [employer] [place]    Rheumatologic disease Paternal Grandmother         many years in wheelchair     Early death Brother         SIDS?? 1 day old     Early death Brother         Swimming  accident @ 19 years old     Breast cancer Neg Hx     Ovarian cancer Neg Hx         Social History     Socioeconomic History    Marital status:    Tobacco Use    Smoking status: Former     Current packs/day: 0.00     Average packs/day: 0.5 packs/day for 3.2 years (1.6 ttl pk-yrs)     Types: Cigarettes     Start date: 1966     Quit date: 9/15/1969     Years since quittin.7    Smokeless tobacco: Never   Vaping Use    Vaping status: Never Used   Substance and Sexual Activity    Alcohol use: Yes     Alcohol/week: 1.0 standard drink of alcohol     Types: 1 Glasses of wine per week     Comment: Very seldom.....at dinner socially...mostly not even a month    Drug use: No    Sexual activity: Not Currently     Partners: Male     Comment: IUD - Failed, had son   had vasectomy.         I have reviewed the patients family history, social history, past medical history, past surgical history and have updated it as appropriate.     Medications:     Current Outpatient Medications:     amLODIPine (NORVASC) 5 MG tablet, Take 1 tablet by mouth Daily., Disp: 90 tablet, Rfl: 1    atorvastatin (LIPITOR) 80 MG tablet, Take 1 tablet by mouth Daily., Disp: 90 tablet, Rfl: 1    Calcium 500-100 MG-UNIT chewable tablet, Chew 1 tablet Daily., Disp: , Rfl:     clindamycin (CLEOCIN T) 1 % external solution, APPLY TOPICALLY TO THE AFFECTED AREA EVERY DAY, Disp: , Rfl:     Coenzyme Q10 30 MG/5ML liquid, Take 5 mL by mouth Daily., Disp: , Rfl:     diazePAM (VALIUM) 5 MG tablet, Take 1 tablet by mouth Every 6 (Six) Hours As Needed., Disp: , Rfl:     fluticasone (FLONASE) 50 MCG/ACT nasal spray, 2 sprays into the nostril(s) as directed by provider Daily., Disp: 15.8 mL, Rfl: 0    hydroCHLOROthiazide (HYDRODIURIL) 25 MG tablet, Take 1 tablet by mouth Daily., Disp: 90 tablet, Rfl: 1    Lancets (OneTouch Delica Plus [id number]) misc, USE AND DISCARD LANCET TO  CHECK BLOOD SUGAR TWO TIMESA DAY, Disp: 200 each, Rfl: 1     "metFORMIN ER (GLUCOPHAGE-XR) 500 MG 24 hr tablet, Take 2 po in the am, 2 po in the pm, Disp: 360 tablet, Rfl: 3    multivitamin with minerals tablet tablet, Take 1 tablet by mouth Daily., Disp: , Rfl:     naproxen sodium (ALEVE) 220 MG tablet, Take 1 tablet by mouth 2 (Two) Times a Day As Needed., Disp: , Rfl:     OneTouch Verio test strip, USE TO TEST 2-3 TIMES DAILY, Disp: 300 each, Rfl: 3    rizatriptan MLT (Maxalt-MLT) 10 MG disintegrating tablet, Place 1 tablet on the tongue 1 (One) Time As Needed for Migraine for up to 1 dose. May repeat in 2 hours if needed, Disp: 9 tablet, Rfl: 1    Allergies:   Allergies   Allergen Reactions    Codeine Nausea Only and Dizziness    Sulfa Antibiotics Rash       Objective       Vital Signs:   Vitals:    05/22/24 1504 05/22/24 1523   BP: 140/82 128/76   BP Location: Left arm    Patient Position: Sitting    Cuff Size: Adult    Pulse: 92    Resp: 16    Temp: 98.6 °F (37 °C)    TempSrc: Tympanic    SpO2: 97%    Weight: 70.8 kg (156 lb)    Height: 160 cm (63\")      Body mass index is 27.63 kg/m².    Physical Exam:  Physical Exam  HENT:      Ears:      Comments: White thick fluid behind right ear TM.      Mouth/Throat:      Comments: Tenderness to right parotid with slight swelling.         Procedures    Results:       Assessment / Plan      Assessment/Plan:   Diagnoses and all orders for this visit:    1. Parotitis, acute (Primary)    2. Non-recurrent acute serous otitis media of right ear                 Follow Up:   No follow-ups on file.    Aniket Calvo, DO    [facility]  "

## 2024-06-21 ENCOUNTER — OFFICE VISIT (OUTPATIENT)
Dept: INTERNAL MEDICINE | Facility: CLINIC | Age: 79
End: 2024-06-21
Payer: MEDICARE

## 2024-06-21 VITALS
HEART RATE: 84 BPM | OXYGEN SATURATION: 97 % | BODY MASS INDEX: 28 KG/M2 | RESPIRATION RATE: 18 BRPM | SYSTOLIC BLOOD PRESSURE: 122 MMHG | WEIGHT: 158 LBS | DIASTOLIC BLOOD PRESSURE: 76 MMHG | TEMPERATURE: 97.1 F | HEIGHT: 63 IN

## 2024-06-21 DIAGNOSIS — J30.2 SEASONAL ALLERGIES: ICD-10-CM

## 2024-06-21 DIAGNOSIS — R29.6 MULTIPLE FALLS: ICD-10-CM

## 2024-06-21 DIAGNOSIS — E11.9 TYPE 2 DIABETES MELLITUS WITHOUT COMPLICATION, WITHOUT LONG-TERM CURRENT USE OF INSULIN: Primary | ICD-10-CM

## 2024-06-21 DIAGNOSIS — E78.2 MIXED HYPERLIPIDEMIA: ICD-10-CM

## 2024-06-21 DIAGNOSIS — R73.09 HEMOGLOBIN A1C LESS THAN 7.0%: ICD-10-CM

## 2024-06-21 DIAGNOSIS — H69.93 DYSFUNCTION OF BOTH EUSTACHIAN TUBES: ICD-10-CM

## 2024-06-21 DIAGNOSIS — I10 ESSENTIAL HYPERTENSION: ICD-10-CM

## 2024-06-21 DIAGNOSIS — F41.1 GENERALIZED ANXIETY DISORDER: ICD-10-CM

## 2024-06-21 LAB
EXPIRATION DATE: ABNORMAL
HBA1C MFR BLD: 6.9 % (ref 4.5–5.7)
Lab: ABNORMAL

## 2024-06-21 PROCEDURE — 3078F DIAST BP <80 MM HG: CPT | Performed by: NURSE PRACTITIONER

## 2024-06-21 PROCEDURE — 1160F RVW MEDS BY RX/DR IN RCRD: CPT | Performed by: NURSE PRACTITIONER

## 2024-06-21 PROCEDURE — 3074F SYST BP LT 130 MM HG: CPT | Performed by: NURSE PRACTITIONER

## 2024-06-21 PROCEDURE — 1126F AMNT PAIN NOTED NONE PRSNT: CPT | Performed by: NURSE PRACTITIONER

## 2024-06-21 PROCEDURE — 1159F MED LIST DOCD IN RCRD: CPT | Performed by: NURSE PRACTITIONER

## 2024-06-21 PROCEDURE — 99214 OFFICE O/P EST MOD 30 MIN: CPT | Performed by: NURSE PRACTITIONER

## 2024-06-21 PROCEDURE — 83036 HEMOGLOBIN GLYCOSYLATED A1C: CPT | Performed by: NURSE PRACTITIONER

## 2024-06-21 PROCEDURE — G2211 COMPLEX E/M VISIT ADD ON: HCPCS | Performed by: NURSE PRACTITIONER

## 2024-06-21 PROCEDURE — 3044F HG A1C LEVEL LT 7.0%: CPT | Performed by: NURSE PRACTITIONER

## 2024-06-21 RX ORDER — FLUTICASONE PROPIONATE 50 MCG
2 SPRAY, SUSPENSION (ML) NASAL DAILY
Qty: 15.8 ML | Refills: 3 | Status: SHIPPED | OUTPATIENT
Start: 2024-06-21

## 2024-06-21 RX ORDER — LORATADINE 10 MG/1
10 TABLET ORAL DAILY
Qty: 30 TABLET | Refills: 3 | Status: SHIPPED | OUTPATIENT
Start: 2024-06-21

## 2024-06-21 RX ORDER — BUSPIRONE HYDROCHLORIDE 5 MG/1
TABLET ORAL
Qty: 90 TABLET | Refills: 1 | Status: SHIPPED | OUTPATIENT
Start: 2024-06-21

## 2024-06-21 NOTE — PROGRESS NOTES
"Chief Complaint   Patient presents with    Diabetes    Hyperlipidemia    Hypertension       HPI  Mariaelena Easley is a 79 y.o. female presents for follow-up on diabetes, HTN, and Hyperlipidemia.  Her last A1c was 6.8.  Has had a few falls due to \"tripping\".  She states that she has not gotten injured with the falls.  Has been having some nausea in the am, along with sneezing and runny nose.  States her  is older than her and she has to take care of him.  Feels like she may have depression or anxiety.  Worrying a lot.  Her daughter-in-law has some type of brain cancer than has returned and she's awaiting treatment.  States that she doesn't have any patience.    The following portions of the patient's history were reviewed and updated as appropriate: allergies, current medications, past family history, past medical history, past social history, past surgical history, and problem list.    Subjective  Review of Systems   Constitutional:  Positive for fatigue. Negative for activity change and appetite change.   HENT:  Positive for rhinorrhea and sneezing. Negative for congestion.    Respiratory:  Negative for cough and shortness of breath.    Cardiovascular:  Negative for chest pain and leg swelling.   Gastrointestinal:  Positive for nausea. Negative for abdominal pain.   Musculoskeletal:  Positive for back pain.   Neurological:  Positive for weakness. Negative for dizziness and confusion.   Psychiatric/Behavioral:  Positive for depressed mood and stress. Negative for behavioral problems and decreased concentration. The patient is nervous/anxious.        Objective  Visit Vitals  /76 (BP Location: Left arm, Patient Position: Sitting, Cuff Size: Adult)   Pulse 84   Temp 97.1 °F (36.2 °C) (Infrared)   Resp 18   Ht 160 cm (63\")   Wt 71.7 kg (158 lb)   LMP  (LMP Unknown)   SpO2 97%   BMI 27.99 kg/m²        Physical Exam  Vitals and nursing note reviewed.   HENT:      Head: Normocephalic.      Right Ear: A middle " ear effusion is present. Tympanic membrane is bulging. Tympanic membrane is not erythematous.      Left Ear: A middle ear effusion is present. Tympanic membrane is not erythematous.   Eyes:      Pupils: Pupils are equal, round, and reactive to light.   Cardiovascular:      Rate and Rhythm: Normal rate and regular rhythm.      Pulses: Normal pulses.      Heart sounds: Normal heart sounds.   Pulmonary:      Effort: Pulmonary effort is normal. No respiratory distress.      Breath sounds: Normal breath sounds.   Lymphadenopathy:      Cervical: No cervical adenopathy.   Skin:     General: Skin is warm and dry.      Capillary Refill: Capillary refill takes less than 2 seconds.   Neurological:      General: No focal deficit present.      Mental Status: She is alert and oriented to person, place, and time.      Gait: Gait is intact.   Psychiatric:         Attention and Perception: Attention normal.         Mood and Affect: Mood normal.         Behavior: Behavior normal.         Thought Content: Thought content normal.         Judgment: Judgment normal.          Procedures       BMI is >= 25 and <30. (Overweight) The following options were offered after discussion;: exercise counseling/recommendations and nutrition counseling/recommendations     Results for orders placed or performed in visit on 06/21/24   POC Glycosylated Hemoglobin (Hb A1C)    Specimen: Blood   Result Value Ref Range    Hemoglobin A1C 6.9 (A) 4.5 - 5.7 %    Lot Number [id number]     Expiration Date 2026.02.12           Assessment and Plan  Diagnoses and all orders for this visit:    1. Type 2 diabetes mellitus without complication, without long-term current use of insulin (Primary)  -     POC Glycosylated Hemoglobin (Hb A1C)    2. Essential hypertension    3. Mixed hyperlipidemia    4. Multiple falls    5. Hemoglobin A1c less than 7.0%    6. Seasonal allergies  -     loratadine (Claritin) 10 MG tablet; Take 1 tablet by mouth Daily.  Dispense: 30 tablet;  Refill: 3  -     fluticasone (FLONASE) 50 MCG/ACT nasal spray; 2 sprays into the nostril(s) as directed by provider Daily.  Dispense: 15.8 mL; Refill: 3    7. Dysfunction of both eustachian tubes    8. Generalized anxiety disorder  -     busPIRone (BUSPAR) 5 MG tablet; Take 1 PO 2-3 times daily  Dispense: 90 tablet; Refill: 1    A1c 6.9, stable, cont Metformin  HTN stable on HCTZ  Cont statin  Pt instructed to move around slowly to help prevent falls  Recommend daily antihistamine to help control allergies, along with Flonase  Buspar for allergies    Return in about 4 weeks (around 7/19/2024) for Recheck Eustachian tube dysfunction and Anxiety.        Huber Ortiz, APRN

## 2024-07-15 DIAGNOSIS — E78.5 HYPERLIPIDEMIA, UNSPECIFIED HYPERLIPIDEMIA TYPE: ICD-10-CM

## 2024-07-15 DIAGNOSIS — I10 ESSENTIAL HYPERTENSION: ICD-10-CM

## 2024-07-15 RX ORDER — HYDROCHLOROTHIAZIDE 25 MG/1
25 TABLET ORAL DAILY
Qty: 90 TABLET | Refills: 1 | Status: SHIPPED | OUTPATIENT
Start: 2024-07-15

## 2024-07-15 RX ORDER — ATORVASTATIN CALCIUM 80 MG/1
80 TABLET, FILM COATED ORAL DAILY
Qty: 90 TABLET | Refills: 1 | Status: SHIPPED | OUTPATIENT
Start: 2024-07-15

## 2024-07-15 RX ORDER — AMLODIPINE BESYLATE 5 MG/1
5 TABLET ORAL DAILY
Qty: 90 TABLET | Refills: 1 | Status: SHIPPED | OUTPATIENT
Start: 2024-07-15

## 2024-07-15 NOTE — TELEPHONE ENCOUNTER
Caller: Mariaelena Easley    Relationship: Self    Best call back number: [phone]     Requested Prescriptions:   Requested Prescriptions     Pending Prescriptions Disp Refills    amLODIPine (NORVASC) 5 MG tablet 90 tablet 1     Sig: Take 1 tablet by mouth Daily.    hydroCHLOROthiazide 25 MG tablet 90 tablet 1     Sig: Take 1 tablet by mouth Daily.    atorvastatin (LIPITOR) 80 MG tablet 90 tablet 1     Sig: Take 1 tablet by mouth Daily.        Pharmacy where request should be sent: [address] [phone] [place] [phone]      Last office visit with prescribing clinician: 6/21/2024   Last telemedicine visit with prescribing clinician: Visit date not found   Next office visit with prescribing clinician: 7/24/2024     Does the patient have less than a 3 day supply:  [x] Yes  [] No    Would you like a call back once the refill request has been completed: [] Yes [x] No    If the office needs to give you a call back, can they leave a voicemail: [] Yes [x] No    Estella Sharma Rep   07/15/24 11:42 EDT

## 2024-07-24 ENCOUNTER — OFFICE VISIT (OUTPATIENT)
Dept: INTERNAL MEDICINE | Facility: CLINIC | Age: 79
End: 2024-07-24
Payer: MEDICARE

## 2024-07-24 VITALS
HEART RATE: 78 BPM | SYSTOLIC BLOOD PRESSURE: 128 MMHG | DIASTOLIC BLOOD PRESSURE: 70 MMHG | OXYGEN SATURATION: 99 % | WEIGHT: 146 LBS | HEIGHT: 63 IN | BODY MASS INDEX: 25.87 KG/M2 | TEMPERATURE: 98 F

## 2024-07-24 DIAGNOSIS — I10 ESSENTIAL HYPERTENSION: ICD-10-CM

## 2024-07-24 DIAGNOSIS — M19.90 GENERALIZED ARTHRITIS: ICD-10-CM

## 2024-07-24 DIAGNOSIS — F43.9 STRESS AT HOME: ICD-10-CM

## 2024-07-24 DIAGNOSIS — E11.9 TYPE 2 DIABETES MELLITUS WITHOUT COMPLICATION, WITHOUT LONG-TERM CURRENT USE OF INSULIN: ICD-10-CM

## 2024-07-24 DIAGNOSIS — E78.2 MIXED HYPERLIPIDEMIA: ICD-10-CM

## 2024-07-24 DIAGNOSIS — R73.09 HEMOGLOBIN A1C LESS THAN 7.0%: ICD-10-CM

## 2024-07-24 DIAGNOSIS — F41.1 GENERALIZED ANXIETY DISORDER: Primary | ICD-10-CM

## 2024-07-24 PROCEDURE — 3078F DIAST BP <80 MM HG: CPT | Performed by: NURSE PRACTITIONER

## 2024-07-24 PROCEDURE — 3074F SYST BP LT 130 MM HG: CPT | Performed by: NURSE PRACTITIONER

## 2024-07-24 PROCEDURE — 99214 OFFICE O/P EST MOD 30 MIN: CPT | Performed by: NURSE PRACTITIONER

## 2024-07-24 PROCEDURE — G2211 COMPLEX E/M VISIT ADD ON: HCPCS | Performed by: NURSE PRACTITIONER

## 2024-07-24 PROCEDURE — 1160F RVW MEDS BY RX/DR IN RCRD: CPT | Performed by: NURSE PRACTITIONER

## 2024-07-24 PROCEDURE — 1126F AMNT PAIN NOTED NONE PRSNT: CPT | Performed by: NURSE PRACTITIONER

## 2024-07-24 PROCEDURE — 1159F MED LIST DOCD IN RCRD: CPT | Performed by: NURSE PRACTITIONER

## 2024-07-24 RX ORDER — BUSPIRONE HYDROCHLORIDE 5 MG/1
TABLET ORAL
Qty: 90 TABLET | Refills: 5 | Status: SHIPPED | OUTPATIENT
Start: 2024-07-24

## 2024-07-24 NOTE — PROGRESS NOTES
"Chief Complaint   Patient presents with    Follow-up    Anxiety       HPI  Mariaelena Easley is a 79 y.o. female presents for follow-up on anxiety.  She was started on Buspar at her last visit.  She has been taking it twice daily.  She feels that she is doing pretty well.  She states she is trying to find patience with her .  Having pain in multiple joints, mostly in her back.  Pain is improved with Tylenol.      The following portions of the patient's history were reviewed and updated as appropriate: allergies, current medications, past family history, past medical history, past social history, past surgical history, and problem list.    Subjective  Review of Systems   Constitutional:  Negative for activity change, appetite change and fatigue.   HENT:  Negative for congestion.    Respiratory:  Negative for cough and shortness of breath.    Cardiovascular:  Negative for chest pain and leg swelling.   Gastrointestinal:  Negative for abdominal pain.   Musculoskeletal:  Positive for arthralgias.   Neurological:  Negative for dizziness, weakness and confusion.   Psychiatric/Behavioral:  Positive for stress. Negative for behavioral problems, decreased concentration and depressed mood. The patient is not nervous/anxious.        Objective  Visit Vitals  /70 (BP Location: Left arm, Patient Position: Sitting)   Pulse 78   Temp 98 °F (36.7 °C)   Ht 160 cm (63\")   Wt 66.2 kg (146 lb)   LMP  (LMP Unknown)   SpO2 99%   BMI 25.86 kg/m²        Physical Exam  Vitals and nursing note reviewed.   HENT:      Head: Normocephalic.   Eyes:      Pupils: Pupils are equal, round, and reactive to light.   Pulmonary:      Effort: Pulmonary effort is normal. No respiratory distress.   Skin:     General: Skin is warm and dry.      Capillary Refill: Capillary refill takes less than 2 seconds.   Neurological:      General: No focal deficit present.      Mental Status: She is alert and oriented to person, place, and time.      Gait: " Gait is intact.   Psychiatric:         Attention and Perception: Attention normal.         Mood and Affect: Mood normal.         Behavior: Behavior normal.          Procedures     Assessment and Plan  Diagnoses and all orders for this visit:    1. Generalized anxiety disorder (Primary)  -     busPIRone (BUSPAR) 5 MG tablet; Take 1 PO 2-3 times daily  Dispense: 90 tablet; Refill: 5    2. Essential hypertension    3. Type 2 diabetes mellitus without complication, without long-term current use of insulin    4. Hemoglobin A1c less than 7.0%    5. Mixed hyperlipidemia    6. Generalized arthritis    7. Stress at home    Anxiety doing well on Buspar  HTN stable on Amlodipine/HCTZ  DM controlled with A1c 6.9 on Metformin  Cont statin  Cont Tylenol for back pain    Return in about 3 months (around 10/24/2024) for Diabetes.        ANGELA Andrade

## 2024-08-19 ENCOUNTER — OFFICE VISIT (OUTPATIENT)
Dept: ORTHOPEDIC SURGERY | Facility: CLINIC | Age: 79
End: 2024-08-19
Payer: MEDICARE

## 2024-08-19 VITALS
DIASTOLIC BLOOD PRESSURE: 88 MMHG | BODY MASS INDEX: 26.93 KG/M2 | HEIGHT: 63 IN | SYSTOLIC BLOOD PRESSURE: 132 MMHG | WEIGHT: 152 LBS

## 2024-08-19 DIAGNOSIS — M75.102 ROTATOR CUFF SYNDROME, LEFT: Primary | ICD-10-CM

## 2024-08-19 PROCEDURE — 20610 DRAIN/INJ JOINT/BURSA W/O US: CPT | Performed by: ORTHOPAEDIC SURGERY

## 2024-08-19 PROCEDURE — 3079F DIAST BP 80-89 MM HG: CPT | Performed by: ORTHOPAEDIC SURGERY

## 2024-08-19 PROCEDURE — 3075F SYST BP GE 130 - 139MM HG: CPT | Performed by: ORTHOPAEDIC SURGERY

## 2024-08-19 PROCEDURE — 1159F MED LIST DOCD IN RCRD: CPT | Performed by: ORTHOPAEDIC SURGERY

## 2024-08-19 PROCEDURE — 1160F RVW MEDS BY RX/DR IN RCRD: CPT | Performed by: ORTHOPAEDIC SURGERY

## 2024-08-19 RX ORDER — TRIAMCINOLONE ACETONIDE 40 MG/ML
40 INJECTION, SUSPENSION INTRA-ARTICULAR; INTRAMUSCULAR
Status: COMPLETED | OUTPATIENT
Start: 2024-08-19 | End: 2024-08-19

## 2024-08-19 RX ORDER — ROPIVACAINE HYDROCHLORIDE 5 MG/ML
4 INJECTION, SOLUTION EPIDURAL; INFILTRATION; PERINEURAL
Status: COMPLETED | OUTPATIENT
Start: 2024-08-19 | End: 2024-08-19

## 2024-08-19 RX ADMIN — TRIAMCINOLONE ACETONIDE 40 MG: 40 INJECTION, SUSPENSION INTRA-ARTICULAR; INTRAMUSCULAR at 14:48

## 2024-08-19 RX ADMIN — ROPIVACAINE HYDROCHLORIDE 4 ML: 5 INJECTION, SOLUTION EPIDURAL; INFILTRATION; PERINEURAL at 14:48

## 2024-08-19 NOTE — PROGRESS NOTES
[facility]    Subjective     Chief Complaint   Patient presents with    Follow-up     3.5 month follow up - Rotator cuff syndrome, left         HPI    It has been 3.5  month(s) since Ms. Easley's last visit. She returns to clinic today for follow-up of left shoulder pain. The issue has been ongoing for 2.5 year(s). She rates her pain a 3/10 on the pain scale. Previous/current treatments: NSAIDS, physical therapy, and steroid injection (last injection 24). Current symptoms: pain and stiffness. The pain is worse with sleeping and lying on affected side; resting, NSAIDS and steroid injection improve the pain. Overall, she is doing worse.  She would like a repeat injection today.  Previous injections helped.      I have reviewed the following portions of the patient's history and agree with: History of Present Illness and Review of Systems    Patient Active Problem List   Diagnosis    Anemia    Hyperlipidemia    Migraine headache    Thyroid disease    Episodic recurrent vertigo    Osteopenia    Seborrheic eczema    Cyst of uterus    Cyst of breast    Essential hypertension    Diabetes mellitus    Squamous cell skin cancer, face    Chest pain     Past Medical History:   Diagnosis Date    Arthritis ?    fingers    Cancer     Cataract 20??    haven't had surgery yet.    CTS (carpal tunnel syndrome)     Cyst of uterus     Diabetes mellitus     Eczema     Headache 199??    Silent Migraines infrequent    Hyperlipidemia     Hypertension     Low back pain 202??    scheduled to see Dr. Britton for back 2024    Low back strain presently - off & on    Rotator cuff syndrome presently    Seborrheic dermatitis     Thyroid disease       Past Surgical History:   Procedure Laterality Date    AUGMENTATION MAMMAPLASTY Bilateral     BREAST EXCISIONAL BIOPSY Right      SECTION      COLONOSCOPY  ??    Dr. Thapa     HAND SURGERY      TRIGGER POINT INJECTION       WRIST SURGERY        Family History   Problem Relation Age of Onset    Heart disease Mother     Arthritis Father     Heart attack Father     Skin cancer Father     COPD Father     Cancer Father         Skin    Lung cancer Sister     Myelodysplastic syndrome Sister     Broken bones Sister         fell off a ladder    Cancer Sister         Lower Lobe Left Lung    Diabetes Brother     Heart attack Brother     Hyperlipidemia Brother     Cancer Brother         Mouth & Neck/Agent [place]    Rheumatologic disease Paternal Grandmother         many years in wheelchair     Early death Brother         SIDS?? 1 day old     Early death Brother         Swimming accident @ 19 years old     Breast cancer Neg Hx     Ovarian cancer Neg Hx      Social History     Socioeconomic History    Marital status:    Tobacco Use    Smoking status: Former     Current packs/day: 0.00     Average packs/day: 0.5 packs/day for 3.2 years (1.6 ttl pk-yrs)     Types: Cigarettes     Start date: 1966     Quit date: 9/15/1969     Years since quittin.9    Smokeless tobacco: Never   Vaping Use    Vaping status: Never Used   Substance and Sexual Activity    Alcohol use: Yes     Alcohol/week: 1.0 standard drink of alcohol     Types: 1 Glasses of wine per week     Comment: Very seldom.....at dinner socially...mostly not even a month    Drug use: No    Sexual activity: Not Currently     Partners: Male     Comment: IUD - Failed, had son   had vasectomy.      Current Outpatient Medications on File Prior to Visit   Medication Sig Dispense Refill    amLODIPine (NORVASC) 5 MG tablet Take 1 tablet by mouth Daily. 90 tablet 1    atorvastatin (LIPITOR) 80 MG tablet Take 1 tablet by mouth Daily. 90 tablet 1    busPIRone (BUSPAR) 5 MG tablet Take 1 PO 2-3 times daily 90 tablet 5    Calcium 500-100 MG-UNIT chewable tablet Chew 1 tablet Daily.      Coenzyme Q10 30 MG/5ML liquid Take 5 mL by mouth Daily.       fluticasone (FLONASE) 50 MCG/ACT nasal spray 2 sprays into the nostril(s) as directed by provider Daily. 15.8 mL 3    hydroCHLOROthiazide 25 MG tablet Take 1 tablet by mouth Daily. 90 tablet 1    Lancets (OneTouch Delica Plus [id number]) misc USE AND DISCARD LANCET TO  CHECK BLOOD SUGAR TWO TIMESA  each 1    loratadine (Claritin) 10 MG tablet Take 1 tablet by mouth Daily. 30 tablet 3    metFORMIN ER (GLUCOPHAGE-XR) 500 MG 24 hr tablet Take 2 po in the am, 2 po in the pm 360 tablet 3    multivitamin with minerals tablet tablet Take 1 tablet by mouth Daily.      OneTouch Verio test strip USE TO TEST 2-3 TIMES DAILY 300 each 3    rizatriptan MLT (Maxalt-MLT) 10 MG disintegrating tablet Place 1 tablet on the tongue 1 (One) Time As Needed for Migraine for up to 1 dose. May repeat in 2 hours if needed 9 tablet 1     No current facility-administered medications on file prior to visit.      Allergies   Allergen Reactions    Codeine Nausea Only and Dizziness    Sulfa Antibiotics Rash        Review of Systems   Constitutional:  Negative for activity change, appetite change, chills, diaphoresis, fatigue, fever and unexpected weight change.   HENT:  Negative for congestion, dental problem, drooling, ear discharge, ear pain, facial swelling, hearing loss, mouth sores, nosebleeds, postnasal drip, rhinorrhea, sinus pressure, sneezing, sore throat, tinnitus, trouble swallowing and voice change.    Eyes:  Negative for photophobia, pain, discharge, redness, itching and visual disturbance.   Respiratory:  Negative for apnea, cough, choking, chest tightness, shortness of breath, wheezing and stridor.    Cardiovascular:  Negative for chest pain, palpitations and leg swelling.   Gastrointestinal:  Negative for abdominal distention, abdominal pain, anal bleeding, blood in stool, constipation, diarrhea, nausea, rectal pain and vomiting.   Endocrine: Negative for cold intolerance, heat intolerance, polydipsia, polyphagia and polyuria.  "  Genitourinary:  Negative for decreased urine volume, difficulty urinating, dysuria, enuresis, flank pain, frequency, genital sores, hematuria and urgency.   Musculoskeletal:  Positive for arthralgias. Negative for back pain, gait problem, joint swelling, myalgias, neck pain and neck stiffness.   Skin:  Negative for color change, pallor, rash and wound.   Allergic/Immunologic: Negative for environmental allergies, food allergies and immunocompromised state.   Neurological:  Negative for dizziness, tremors, seizures, syncope, facial asymmetry, speech difficulty, weakness, light-headedness, numbness and headaches.   Hematological:  Negative for adenopathy. Does not bruise/bleed easily.   Psychiatric/Behavioral:  Negative for agitation, behavioral problems, confusion, decreased concentration, dysphoric mood, hallucinations, self-injury, sleep disturbance and suicidal ideas. The patient is not nervous/anxious and is not hyperactive.         Objective      Physical Exam  /88   Ht 160 cm (62.99\")   Wt 68.9 kg (152 lb)   LMP  (LMP Unknown)   BMI 26.93 kg/m²     Body mass index is 26.93 kg/m².         General:   Mental Status:  Alert   Appearance: Cooperative, in no acute distress   Build and Nutrition: Well-nourished well-developed female   Orientation: Alert and oriented to person, place and time   Posture: Normal   Gait: Nonantalgic    Upper Extremities:              Left Shoulder:                          Tenderness:     None                          Swelling:          None                          Crepitus:          None                          Atrophy:           None                          Range of motion:        External rotation:         50°                                                              Forward flexion:          170°                                                              Abduction:                   170°  Instability:        None  Deformities:     None  Functional testing: Negative " drop arm, negative lift-off, positive impingement    Imaging/Studies  Imaging Results (Last 24 Hours)       ** No results found for the last 24 hours. **          No new imaging today.    Assessment and Plan     Diagnoses and all orders for this visit:    1. Rotator cuff syndrome, left (Primary)  -     - Large Joint Arthrocentesis: L subacromial bursa        1. Rotator cuff syndrome, left          I reviewed my findings with the patient.  She would like a repeat injection today, and this is provided.  I will see her back in 4 months, but sooner for any problems.  Limitations of the injections have been discussed.    Procedure Note:  The potential benefits of performing a therapeutic left shoulder subacromial bursal injection, as well as potential risks (including, but not limited to infection, swelling, pain, bleeding, bruising, nerve/blood vessel damage, skin color changes, transient elevation in blood glucose levels, and fat atrophy) were discussed with the patient.  After informed consent, timeout procedure was performed, and the skin on the left shoulder was prepped with chlorhexidine soap and alcohol, after which ethyl chloride was applied to the skin at the injection site. Via the posterior approach, 1ml of Kenalog 40mg/ml mixed with 4ml 0.5% ropivacaine plain was injected into the subacromial bursa.  The patient tolerated the procedure well, experiencing 70% improvement a few minutes following the injection. There were no complications.  Band-Aid was applied to the injection site. Post-procedural instructions were given to the patient and/or their caregiver.      Return in about 4 months (around 12/19/2024).      Rufino Amaya MD  08/19/24  15:11 EDT    Dictated Utilizing Dragon Dictation

## 2024-08-19 NOTE — PROGRESS NOTES
Procedure   - Large Joint Arthrocentesis: L subacromial bursa on 8/19/2024 2:48 PM  Indications: pain  Details: 21 G needle, posterior approach  Medications: 4 mL ropivacaine 0.5 %; 40 mg triamcinolone acetonide 40 MG/ML  Outcome: tolerated well, no immediate complications  Procedure, treatment alternatives, risks and benefits explained, specific risks discussed. Consent was given by the patient. Immediately prior to procedure a time out was called to verify the correct patient, procedure, equipment, support staff and site/side marked as required. Patient was prepped and draped in the usual sterile fashion.

## 2024-10-10 DIAGNOSIS — J30.2 SEASONAL ALLERGIES: ICD-10-CM

## 2024-10-11 RX ORDER — FLUTICASONE PROPIONATE 50 UG/1
SPRAY, METERED NASAL
Qty: 16 G | Refills: 2 | Status: SHIPPED | OUTPATIENT
Start: 2024-10-11

## 2024-10-25 ENCOUNTER — OFFICE VISIT (OUTPATIENT)
Dept: INTERNAL MEDICINE | Facility: CLINIC | Age: 79
End: 2024-10-25
Payer: MEDICARE

## 2024-10-25 VITALS
DIASTOLIC BLOOD PRESSURE: 78 MMHG | TEMPERATURE: 97.2 F | HEART RATE: 78 BPM | SYSTOLIC BLOOD PRESSURE: 130 MMHG | BODY MASS INDEX: 27.23 KG/M2 | WEIGHT: 148 LBS | OXYGEN SATURATION: 99 % | HEIGHT: 62 IN

## 2024-10-25 DIAGNOSIS — F41.1 GENERALIZED ANXIETY DISORDER: ICD-10-CM

## 2024-10-25 DIAGNOSIS — I10 ESSENTIAL HYPERTENSION: ICD-10-CM

## 2024-10-25 DIAGNOSIS — Z23 NEED FOR INFLUENZA VACCINATION: ICD-10-CM

## 2024-10-25 DIAGNOSIS — E11.9 TYPE 2 DIABETES MELLITUS WITHOUT COMPLICATION, WITHOUT LONG-TERM CURRENT USE OF INSULIN: Primary | ICD-10-CM

## 2024-10-25 DIAGNOSIS — J30.2 SEASONAL ALLERGIES: ICD-10-CM

## 2024-10-25 LAB
EXPIRATION DATE: ABNORMAL
HBA1C MFR BLD: 6.7 % (ref 4.5–5.7)
Lab: ABNORMAL

## 2024-10-25 PROCEDURE — 83036 HEMOGLOBIN GLYCOSYLATED A1C: CPT | Performed by: NURSE PRACTITIONER

## 2024-10-25 PROCEDURE — 99214 OFFICE O/P EST MOD 30 MIN: CPT | Performed by: NURSE PRACTITIONER

## 2024-10-25 PROCEDURE — 3075F SYST BP GE 130 - 139MM HG: CPT | Performed by: NURSE PRACTITIONER

## 2024-10-25 PROCEDURE — 3044F HG A1C LEVEL LT 7.0%: CPT | Performed by: NURSE PRACTITIONER

## 2024-10-25 PROCEDURE — 3078F DIAST BP <80 MM HG: CPT | Performed by: NURSE PRACTITIONER

## 2024-10-25 PROCEDURE — G0008 ADMIN INFLUENZA VIRUS VAC: HCPCS | Performed by: NURSE PRACTITIONER

## 2024-10-25 PROCEDURE — 1126F AMNT PAIN NOTED NONE PRSNT: CPT | Performed by: NURSE PRACTITIONER

## 2024-10-25 PROCEDURE — 1160F RVW MEDS BY RX/DR IN RCRD: CPT | Performed by: NURSE PRACTITIONER

## 2024-10-25 PROCEDURE — 90662 IIV NO PRSV INCREASED AG IM: CPT | Performed by: NURSE PRACTITIONER

## 2024-10-25 PROCEDURE — 1159F MED LIST DOCD IN RCRD: CPT | Performed by: NURSE PRACTITIONER

## 2024-10-25 NOTE — PROGRESS NOTES
"Chief Complaint   Patient presents with    Diabetes    Follow-up       HPI  Mariaelena Easley is a 79 y.o. female presents for follow-up on diabetes.  Her last A1c in June was 6.9.  She states she has been eating a lot of sugar lately so she's afraid her A1c will be high.  Start to have problems with her right arm.  She is already seeing ortho for her left shoulder.    The following portions of the patient's history were reviewed and updated as appropriate: allergies, current medications, past family history, past medical history, past social history, past surgical history, and problem list.    Subjective  Review of Systems   Constitutional:  Negative for activity change, appetite change and fatigue.   HENT:  Negative for congestion.    Respiratory:  Negative for cough and shortness of breath.    Cardiovascular:  Negative for chest pain and leg swelling.   Gastrointestinal:  Negative for abdominal pain.   Neurological:  Negative for dizziness, weakness and confusion.   Psychiatric/Behavioral:  Negative for behavioral problems and decreased concentration.        Objective  Visit Vitals  /78 (BP Location: Left arm, Patient Position: Sitting)   Pulse 78   Temp 97.2 °F (36.2 °C)   Ht 157.5 cm (62\")   Wt 67.1 kg (148 lb)   LMP  (LMP Unknown)   SpO2 99%   BMI 27.07 kg/m²        Physical Exam  Vitals and nursing note reviewed.   HENT:      Head: Normocephalic.   Eyes:      Pupils: Pupils are equal, round, and reactive to light.   Cardiovascular:      Rate and Rhythm: Normal rate and regular rhythm.      Pulses: Normal pulses.      Heart sounds: Normal heart sounds.   Pulmonary:      Effort: Pulmonary effort is normal. No respiratory distress.      Breath sounds: Normal breath sounds.   Skin:     General: Skin is warm and dry.      Capillary Refill: Capillary refill takes less than 2 seconds.   Neurological:      General: No focal deficit present.      Mental Status: She is alert and oriented to person, place, and time. "      Gait: Gait is intact.   Psychiatric:         Attention and Perception: Attention normal.         Mood and Affect: Mood normal.         Behavior: Behavior normal.            Procedures       Results for orders placed or performed in visit on 10/25/24   POC Glycosylated Hemoglobin (Hb A1C)    Collection Time: 10/25/24  3:54 PM    Specimen: Blood   Result Value Ref Range    Hemoglobin A1C 6.7 (A) 4.5 - 5.7 %    Lot Number [id number]     Expiration Date 04/04/2026         Assessment and Plan  Diagnoses and all orders for this visit:    1. Type 2 diabetes mellitus without complication, without long-term current use of insulin (Primary)  -     POC Glycosylated Hemoglobin (Hb A1C)    2. Need for influenza vaccination  -     Fluzone High-Dose 65+yrs ([id number])    3. Essential hypertension    4. Generalized anxiety disorder    5. Seasonal allergies    A1c 6.7  Cont Metformin  HTN stable on Amlodipine /HCTZ  Anxiety stable on Buspar  Allergies stable on Loratadine and flonase    Return for Next scheduled follow up.      ANGELA Andrade

## 2024-12-16 ENCOUNTER — OFFICE VISIT (OUTPATIENT)
Dept: ORTHOPEDIC SURGERY | Facility: CLINIC | Age: 79
End: 2024-12-16
Payer: MEDICARE

## 2024-12-16 VITALS
WEIGHT: 150 LBS | HEIGHT: 62 IN | BODY MASS INDEX: 27.6 KG/M2 | SYSTOLIC BLOOD PRESSURE: 140 MMHG | DIASTOLIC BLOOD PRESSURE: 90 MMHG

## 2024-12-16 DIAGNOSIS — M75.102 ROTATOR CUFF SYNDROME, LEFT: Primary | ICD-10-CM

## 2024-12-16 PROCEDURE — 20610 DRAIN/INJ JOINT/BURSA W/O US: CPT | Performed by: ORTHOPAEDIC SURGERY

## 2024-12-16 PROCEDURE — 1160F RVW MEDS BY RX/DR IN RCRD: CPT | Performed by: ORTHOPAEDIC SURGERY

## 2024-12-16 PROCEDURE — 3077F SYST BP >= 140 MM HG: CPT | Performed by: ORTHOPAEDIC SURGERY

## 2024-12-16 PROCEDURE — 1159F MED LIST DOCD IN RCRD: CPT | Performed by: ORTHOPAEDIC SURGERY

## 2024-12-16 PROCEDURE — 3080F DIAST BP >= 90 MM HG: CPT | Performed by: ORTHOPAEDIC SURGERY

## 2024-12-16 RX ORDER — ROPIVACAINE HYDROCHLORIDE 5 MG/ML
4 INJECTION, SOLUTION EPIDURAL; INFILTRATION; PERINEURAL
Status: COMPLETED | OUTPATIENT
Start: 2024-12-16 | End: 2024-12-16

## 2024-12-16 RX ORDER — TRIAMCINOLONE ACETONIDE 40 MG/ML
40 INJECTION, SUSPENSION INTRA-ARTICULAR; INTRAMUSCULAR
Status: COMPLETED | OUTPATIENT
Start: 2024-12-16 | End: 2024-12-16

## 2024-12-16 RX ADMIN — TRIAMCINOLONE ACETONIDE 40 MG: 40 INJECTION, SUSPENSION INTRA-ARTICULAR; INTRAMUSCULAR at 15:35

## 2024-12-16 RX ADMIN — ROPIVACAINE HYDROCHLORIDE 4 ML: 5 INJECTION, SOLUTION EPIDURAL; INFILTRATION; PERINEURAL at 15:35

## 2024-12-16 NOTE — PROGRESS NOTES
Procedure   - Large Joint Arthrocentesis on 12/16/2024 3:35 PM  Indications: pain  Details: 21 G needle, posterior approach  Medications: 4 mL ropivacaine 0.5 %; 40 mg triamcinolone acetonide 40 MG/ML  Outcome: tolerated well, no immediate complications  Procedure, treatment alternatives, risks and benefits explained, specific risks discussed. Consent was given by the patient. Immediately prior to procedure a time out was called to verify the correct patient, procedure, equipment, support staff and site/side marked as required. Patient was prepped and draped in the usual sterile fashion.

## 2024-12-16 NOTE — PROGRESS NOTES
[facility]    Subjective     Chief Complaint   Patient presents with    Right Shoulder - Pain, Initial Evaluation    Follow-up     4 month follow up--Rotator cuff syndrome, left        HPI    It has been 4  month(s) since Ms. Easley's last visit. She returns to clinic today for follow-up of left shoulder pain. The issue has been ongoing for 2 year(s). She rates her pain a 2/10 on the pain scale. Previous/current treatments: NSAIDS, physical therapy, and steroid injection (last injection 2024). Current symptoms: pain. The pain is worse with sleeping and lying on affected side; resting and pain medication and/or NSAID improve the pain. Overall, she is doing the same.  She would like a repeat injection today.      I have reviewed the following portions of the patient's history and agree with: History of Present Illness and Review of Systems    Patient Active Problem List   Diagnosis    Anemia    Hyperlipidemia    Migraine headache    Thyroid disease    Episodic recurrent vertigo    Osteopenia    Seborrheic eczema    Cyst of uterus    Cyst of breast    Essential hypertension    Diabetes mellitus    Squamous cell skin cancer, face    Chest pain     Past Medical History:   Diagnosis Date    Arthritis ?    fingers    Cancer     Cataract 20??    haven't had surgery yet.    CTS (carpal tunnel syndrome)     Cyst of uterus     Diabetes mellitus     Eczema     Headache 199??    Silent Migraines infrequent    Hyperlipidemia     Hypertension     Low back pain 202??    scheduled to see Dr. Britton for back 2024    Low back strain presently - off & on    Rotator cuff syndrome presently    Seborrheic dermatitis     Thyroid disease       Past Surgical History:   Procedure Laterality Date    AUGMENTATION MAMMAPLASTY Bilateral     BREAST EXCISIONAL BIOPSY Right      SECTION      COLONOSCOPY  ??    Dr. Thapa     HAND SURGERY      TRIGGER POINT INJECTION       WRIST SURGERY        Family History   Problem Relation Age of Onset    Heart disease Mother     Arthritis Father     Heart attack Father     Skin cancer Father     COPD Father     Cancer Father         Skin    Lung cancer Sister     Myelodysplastic syndrome Sister     Broken bones Sister         fell off a ladder    Cancer Sister         Lower Lobe Left Lung    Diabetes Brother     Heart attack Brother     Hyperlipidemia Brother     Cancer Brother         Mouth & Neck/Agent [place]    Rheumatologic disease Paternal Grandmother         many years in wheelchair     Early death Brother         SIDS?? 1 day old     Early death Brother         Swimming accident @ 19 years old     Breast cancer Neg Hx     Ovarian cancer Neg Hx      Social History     Socioeconomic History    Marital status:    Tobacco Use    Smoking status: Former     Current packs/day: 0.00     Average packs/day: 0.5 packs/day for 3.2 years (1.6 ttl pk-yrs)     Types: Cigarettes     Start date: 1966     Quit date: 9/15/1969     Years since quittin.2    Smokeless tobacco: Never   Vaping Use    Vaping status: Never Used   Substance and Sexual Activity    Alcohol use: Yes     Alcohol/week: 1.0 standard drink of alcohol     Types: 1 Glasses of wine per week     Comment: Very seldom.....at dinner socially...mostly not even a month    Drug use: No    Sexual activity: Not Currently     Partners: Male     Comment: IUD - Failed, had son   had vasectomy.      Current Outpatient Medications on File Prior to Visit   Medication Sig Dispense Refill    amLODIPine (NORVASC) 5 MG tablet Take 1 tablet by mouth Daily. 90 tablet 1    atorvastatin (LIPITOR) 80 MG tablet Take 1 tablet by mouth Daily. 90 tablet 1    busPIRone (BUSPAR) 5 MG tablet Take 1 PO 2-3 times daily 90 tablet 5    Calcium 500-100 MG-UNIT chewable tablet Chew 1 tablet Daily.      Coenzyme Q10 30 MG/5ML liquid Take 5 mL by mouth Daily.       fluticasone (FLONASE) 50 MCG/ACT nasal spray 2 SPRAYS INTO THE NOSTRIL(S) AS DIRECTED 16 g 2    hydroCHLOROthiazide 25 MG tablet Take 1 tablet by mouth Daily. 90 tablet 1    Lancets (OneTouch Delica Plus [id number]) misc USE AND DISCARD LANCET TO  CHECK BLOOD SUGAR TWO TIMESA  each 1    loratadine (Claritin) 10 MG tablet Take 1 tablet by mouth Daily. 30 tablet 3    metFORMIN ER (GLUCOPHAGE-XR) 500 MG 24 hr tablet Take 2 po in the am, 2 po in the pm 360 tablet 3    multivitamin with minerals tablet tablet Take 1 tablet by mouth Daily.      OneTouch Verio test strip USE TO TEST 2-3 TIMES DAILY 300 each 3    rizatriptan MLT (Maxalt-MLT) 10 MG disintegrating tablet Place 1 tablet on the tongue 1 (One) Time As Needed for Migraine for up to 1 dose. May repeat in 2 hours if needed 9 tablet 1     No current facility-administered medications on file prior to visit.      Allergies   Allergen Reactions    Codeine Nausea Only and Dizziness    Sulfa Antibiotics Rash        Review of Systems   Constitutional:  Negative for activity change, appetite change, chills, diaphoresis, fatigue, fever and unexpected weight change.   HENT:  Negative for congestion, dental problem, drooling, ear discharge, ear pain, facial swelling, hearing loss, mouth sores, nosebleeds, postnasal drip, rhinorrhea, sinus pressure, sneezing, sore throat, tinnitus, trouble swallowing and voice change.    Eyes:  Negative for photophobia, pain, discharge, redness, itching and visual disturbance.   Respiratory:  Negative for apnea, cough, choking, chest tightness, shortness of breath, wheezing and stridor.    Cardiovascular:  Negative for chest pain, palpitations and leg swelling.   Gastrointestinal:  Negative for abdominal distention, abdominal pain, anal bleeding, blood in stool, constipation, diarrhea, nausea, rectal pain and vomiting.   Endocrine: Negative for cold intolerance, heat intolerance, polydipsia, polyphagia and polyuria.   Genitourinary:   "Negative for decreased urine volume, difficulty urinating, dysuria, enuresis, flank pain, frequency, genital sores, hematuria and urgency.   Musculoskeletal:  Positive for arthralgias. Negative for back pain, gait problem, joint swelling, myalgias, neck pain and neck stiffness.   Skin:  Negative for color change, pallor, rash and wound.   Allergic/Immunologic: Negative for environmental allergies, food allergies and immunocompromised state.   Neurological:  Negative for dizziness, tremors, seizures, syncope, facial asymmetry, speech difficulty, weakness, light-headedness, numbness and headaches.   Hematological:  Negative for adenopathy. Does not bruise/bleed easily.   Psychiatric/Behavioral:  Negative for agitation, behavioral problems, confusion, decreased concentration, dysphoric mood, hallucinations, self-injury, sleep disturbance and suicidal ideas. The patient is not nervous/anxious and is not hyperactive.         Objective      Physical Exam  /90   Ht 157.5 cm (62.01\")   Wt 68 kg (150 lb)   LMP  (LMP Unknown)   BMI 27.43 kg/m²     Body mass index is 27.43 kg/m².         General:   Mental Status:  Alert   Appearance: Cooperative, in no acute distress   Build and Nutrition: Well-nourished well-developed female   Orientation: Alert and oriented to person, place and time   Posture: Normal   Gait: Nonantalgic    Upper Extremities:              Left Shoulder:                          Tenderness:     None                          Swelling:          None                          Crepitus:          None                          Atrophy:           None                          Range of motion:        External rotation:         45°                                                              Forward flexion:          170°                                                              Abduction:                   170°  Instability:        None  Deformities:     None  Functional testing: Negative drop arm, " negative lift-off, positive impingement    Imaging/Studies  Imaging Results (Last 24 Hours)       ** No results found for the last 24 hours. **          No new imaging today.    Assessment and Plan     Diagnoses and all orders for this visit:    1. Rotator cuff syndrome, left (Primary)  -     - Large Joint Arthrocentesis        1. Rotator cuff syndrome, left          I reviewed my findings with the patient again today.  She would like a repeat subacromial injection and this was provided today.    She was also telling me about her right shoulder today.  Given that both of her shoulders are bothering her now, I think she would be best served by seeing a shoulder specialist and we will refer her to Dr. Costello for an evaluation and continued care.    Procedure Note:  The potential benefits of performing a therapeutic left shoulder subacromial bursal injection, as well as potential risks (including, but not limited to infection, swelling, pain, bleeding, bruising, nerve/blood vessel damage, skin color changes, transient elevation in blood glucose levels, and fat atrophy) were discussed with the patient.  After informed consent, timeout procedure was performed, and the skin on the left shoulder was prepped with chlorhexidine soap and alcohol, after which ethyl chloride was applied to the skin at the injection site. Via the posterior approach, 1ml of Kenalog 40mg/ml mixed with 4ml 0.5% ropivacaine plain was injected into the subacromial bursa.  The patient tolerated the procedure well, experiencing 30% improvement a few minutes following the injection. There were no complications.  Band-Aid was applied to the injection site. Post-procedural instructions were given to the patient and/or their caregiver.      Rufino Amaya MD  12/16/24  16:30 EST    Dictated Utilizing Dragon Dictation

## 2024-12-27 ENCOUNTER — OFFICE VISIT (OUTPATIENT)
Dept: INTERNAL MEDICINE | Facility: CLINIC | Age: 79
End: 2024-12-27
Payer: MEDICARE

## 2024-12-27 VITALS
BODY MASS INDEX: 28.19 KG/M2 | HEIGHT: 62 IN | HEART RATE: 78 BPM | TEMPERATURE: 98 F | OXYGEN SATURATION: 99 % | WEIGHT: 153.2 LBS | SYSTOLIC BLOOD PRESSURE: 120 MMHG | DIASTOLIC BLOOD PRESSURE: 78 MMHG

## 2024-12-27 DIAGNOSIS — I10 ESSENTIAL HYPERTENSION: ICD-10-CM

## 2024-12-27 DIAGNOSIS — C44.320 SQUAMOUS CELL SKIN CANCER, FACE: ICD-10-CM

## 2024-12-27 DIAGNOSIS — M85.852 OSTEOPENIA OF NECK OF LEFT FEMUR: ICD-10-CM

## 2024-12-27 DIAGNOSIS — F51.01 PRIMARY INSOMNIA: ICD-10-CM

## 2024-12-27 DIAGNOSIS — E11.9 TYPE 2 DIABETES MELLITUS WITHOUT COMPLICATION, WITHOUT LONG-TERM CURRENT USE OF INSULIN: ICD-10-CM

## 2024-12-27 DIAGNOSIS — L21.9 SEBORRHEIC ECZEMA: ICD-10-CM

## 2024-12-27 DIAGNOSIS — Z00.00 MEDICARE ANNUAL WELLNESS VISIT, SUBSEQUENT: Primary | ICD-10-CM

## 2024-12-27 DIAGNOSIS — E78.2 MIXED HYPERLIPIDEMIA: ICD-10-CM

## 2024-12-27 DIAGNOSIS — G43.909 MIGRAINE WITHOUT STATUS MIGRAINOSUS, NOT INTRACTABLE, UNSPECIFIED MIGRAINE TYPE: ICD-10-CM

## 2024-12-27 DIAGNOSIS — R73.09 HEMOGLOBIN A1C LESS THAN 7.0%: ICD-10-CM

## 2024-12-27 DIAGNOSIS — J30.2 SEASONAL ALLERGIES: ICD-10-CM

## 2024-12-27 DIAGNOSIS — Z78.0 POSTMENOPAUSAL: ICD-10-CM

## 2024-12-27 DIAGNOSIS — M19.90 GENERALIZED ARTHRITIS: ICD-10-CM

## 2024-12-27 DIAGNOSIS — E78.5 HYPERLIPIDEMIA, UNSPECIFIED HYPERLIPIDEMIA TYPE: ICD-10-CM

## 2024-12-27 DIAGNOSIS — F41.1 GENERALIZED ANXIETY DISORDER: ICD-10-CM

## 2024-12-27 PROCEDURE — 3078F DIAST BP <80 MM HG: CPT | Performed by: NURSE PRACTITIONER

## 2024-12-27 PROCEDURE — 1125F AMNT PAIN NOTED PAIN PRSNT: CPT | Performed by: NURSE PRACTITIONER

## 2024-12-27 PROCEDURE — G0439 PPPS, SUBSEQ VISIT: HCPCS | Performed by: NURSE PRACTITIONER

## 2024-12-27 PROCEDURE — 3074F SYST BP LT 130 MM HG: CPT | Performed by: NURSE PRACTITIONER

## 2024-12-27 PROCEDURE — 1160F RVW MEDS BY RX/DR IN RCRD: CPT | Performed by: NURSE PRACTITIONER

## 2024-12-27 PROCEDURE — 1159F MED LIST DOCD IN RCRD: CPT | Performed by: NURSE PRACTITIONER

## 2024-12-27 RX ORDER — ATORVASTATIN CALCIUM 80 MG/1
80 TABLET, FILM COATED ORAL DAILY
Qty: 90 TABLET | Refills: 3 | Status: SHIPPED | OUTPATIENT
Start: 2024-12-27

## 2024-12-27 RX ORDER — METFORMIN HYDROCHLORIDE 500 MG/1
TABLET, EXTENDED RELEASE ORAL
Qty: 360 TABLET | Refills: 3 | Status: SHIPPED | OUTPATIENT
Start: 2024-12-27

## 2024-12-27 RX ORDER — HYDROCHLOROTHIAZIDE 25 MG/1
25 TABLET ORAL DAILY
Qty: 90 TABLET | Refills: 3 | Status: SHIPPED | OUTPATIENT
Start: 2024-12-27

## 2024-12-27 RX ORDER — BUSPIRONE HYDROCHLORIDE 5 MG/1
TABLET ORAL
Qty: 270 TABLET | Refills: 3 | Status: SHIPPED | OUTPATIENT
Start: 2024-12-27

## 2024-12-27 RX ORDER — AMLODIPINE BESYLATE 5 MG/1
5 TABLET ORAL DAILY
Qty: 90 TABLET | Refills: 3 | Status: SHIPPED | OUTPATIENT
Start: 2024-12-27

## 2024-12-27 NOTE — ASSESSMENT & PLAN NOTE
Diabetes is stable.   Continue current treatment regimen.  Diabetes will be reassessed in 6 months    Orders:    MicroAlbumin, Urine, Random - Urine, Clean Catch; Future    metFORMIN ER (GLUCOPHAGE-XR) 500 MG 24 hr tablet; Take 2 po in the am, 2 po in the pm

## 2024-12-27 NOTE — PROGRESS NOTES
Subjective   The ABCs of the Annual Wellness Visit  Medicare Wellness Visit      Mariaelena Easley is a 79 y.o. patient who presents for a Medicare Wellness Visit.    The following portions of the patient's history were reviewed and   updated as appropriate: allergies, current medications, past family history, past medical history, past social history, past surgical history, and problem list.    Compared to one year ago, the patient's physical   health is better.  Compared to one year ago, the patient's mental   health is better.    Recent Hospitalizations:  She was not admitted to the hospital during the last year.     Current Medical Providers:  Patient Care Team:  Huber Ortiz APRN as PCP - General (Family Medicine)  Stella Jerez APRN as Nurse Practitioner (Cardiology)  Raymond Mcdaniel MD as Consulting Physician (Cardiology)  Rufino Amaya MD as Consulting Physician (Orthopedic Surgery)    Outpatient Medications Prior to Visit   Medication Sig Dispense Refill    Calcium 500-100 MG-UNIT chewable tablet Chew 1 tablet Daily.      Coenzyme Q10 30 MG/5ML liquid Take 5 mL by mouth Daily.      Lancets (OneTouch Delica Plus [id number]) misc USE AND DISCARD LANCET TO  CHECK BLOOD SUGAR TWO TIMESA  each 1    multivitamin with minerals tablet tablet Take 1 tablet by mouth Daily.      OneTouch Verio test strip USE TO TEST 2-3 TIMES DAILY 300 each 3    rizatriptan MLT (Maxalt-MLT) 10 MG disintegrating tablet Place 1 tablet on the tongue 1 (One) Time As Needed for Migraine for up to 1 dose. May repeat in 2 hours if needed 9 tablet 1    amLODIPine (NORVASC) 5 MG tablet Take 1 tablet by mouth Daily. 90 tablet 1    atorvastatin (LIPITOR) 80 MG tablet Take 1 tablet by mouth Daily. 90 tablet 1    busPIRone (BUSPAR) 5 MG tablet Take 1 PO 2-3 times daily 90 tablet 5    fluticasone (FLONASE) 50 MCG/ACT nasal spray 2 SPRAYS INTO THE NOSTRIL(S) AS DIRECTED 16 g 2    hydroCHLOROthiazide 25 MG tablet Take 1 tablet  "by mouth Daily. 90 tablet 1    loratadine (Claritin) 10 MG tablet Take 1 tablet by mouth Daily. 30 tablet 3    metFORMIN ER (GLUCOPHAGE-XR) 500 MG 24 hr tablet Take 2 po in the am, 2 po in the pm 360 tablet 3     No facility-administered medications prior to visit.     No opioid medication identified on active medication list. I have reviewed chart for other potential  high risk medication/s and harmful drug interactions in the elderly.      Aspirin is not on active medication list.  Aspirin use is not indicated based on review of current medical condition/s. Risk of harm outweighs potential benefits.  .    Patient Active Problem List   Diagnosis    Anemia    Hyperlipidemia    Migraine headache    Thyroid disease    Episodic recurrent vertigo    Osteopenia    Seborrheic eczema    Cyst of uterus    Cyst of breast    Essential hypertension    Diabetes mellitus    Squamous cell skin cancer, face    Chest pain     Advance Care Planning Advance Directive is not on file.  ACP discussion was held with the patient during this visit. Patient has an advance directive (not in EMR), copy requested.            Objective   Vitals:    12/27/24 1404   BP: 120/78   BP Location: Left arm   Patient Position: Sitting   Pulse: 78   Temp: 98 °F (36.7 °C)   SpO2: 99%   Weight: 69.5 kg (153 lb 3.2 oz)   Height: 157.5 cm (62\")   PainSc:   2       Estimated body mass index is 28.02 kg/m² as calculated from the following:    Height as of this encounter: 157.5 cm (62\").    Weight as of this encounter: 69.5 kg (153 lb 3.2 oz).                Does the patient have evidence of cognitive impairment? No  Lab Results   Component Value Date    HGBA1C 6.7 (A) 10/25/2024                                                                                               Health  Risk Assessment    Smoking Status:  Social History     Tobacco Use   Smoking Status Former    Current packs/day: 0.00    Average packs/day: 0.5 packs/day for 3.2 years (1.6 ttl pk-yrs) "    Types: Cigarettes    Start date: 1966    Quit date: 9/15/1969    Years since quittin.3   Smokeless Tobacco Never     Alcohol Consumption:  Social History     Substance and Sexual Activity   Alcohol Use Yes    Alcohol/week: 1.0 standard drink of alcohol    Types: 1 Glasses of wine per week    Comment: Very seldom.....at dinner socially...mostly not even a month       Fall Risk Screen  MAY Fall Risk Assessment was completed, and patient is at LOW risk for falls.Assessment completed on:2024    Depression Screening   Little interest or pleasure in doing things? Not at all   Feeling down, depressed, or hopeless? Not at all   PHQ-2 Total Score 0   Trouble falling or staying asleep, or sleeping too much? Several days   Feeling tired or having little energy? Several days   Poor appetite or overeating? Not at all   Feeling bad about yourself - or that you are a failure or have let yourself or your family down? Not at all   Trouble concentrating on things, such as reading the newspaper or watching television? Not at all   Moving or speaking so slowly that other people could have noticed? Or the opposite - being so fidgety or restless that you have been moving around a lot more than usual? Not at all   Thoughts that you would be better off dead, or of hurting yourself in some way? Not at all   PHQ-9 Total Score 2   If you checked off any problems, how difficult have these problems made it for you to do your work, take care of things at home, or get along with other people? Not difficult at all      Health Habits and Functional and Cognitive Screenin/27/2024     2:00 PM   Functional & Cognitive Status   Do you have difficulty preparing food and eating? No   Do you have difficulty bathing yourself, getting dressed or grooming yourself? No   Do you have difficulty using the toilet? No   Do you have difficulty moving around from place to place? No   Do you have trouble with steps or getting out of a  bed or a chair? No   Current Diet Well Balanced Diet   Dental Exam Up to date   Eye Exam Up to date   Exercise (times per week) 0 times per week   Current Exercises Include No Regular Exercise   Do you need help using the phone?  No   Are you deaf or do you have serious difficulty hearing?  No   Do you need help to go to places out of walking distance? No   Do you need help shopping? No   Do you need help preparing meals?  No   Do you need help with housework?  No   Do you need help with laundry? No   Do you need help taking your medications? No   Do you need help managing money? No   Do you ever drive or ride in a car without wearing a seat belt? No   Have you felt unusual stress, anger or loneliness in the last month? No   Who do you live with? Spouse   If you need help, do you have trouble finding someone available to you? No   Have you been bothered in the last four weeks by sexual problems? No   Do you have difficulty concentrating, remembering or making decisions? No           Age-appropriate Screening Schedule:  Refer to the list below for future screening recommendations based on patient's age, sex and/or medical conditions. Orders for these recommended tests are listed in the plan section. The patient has been provided with a written plan.    Health Maintenance List  Health Maintenance   Topic Date Due    DIABETIC FOOT EXAM  12/19/2023    DXA SCAN  10/11/2024    DIABETIC EYE EXAM  11/01/2024    LIPID PANEL  12/22/2024    URINE MICROALBUMIN  12/22/2024    ZOSTER VACCINE (2 of 2) 12/27/2024 (Originally 2/26/2012)    COVID-19 Vaccine (6 - 2024-25 season) 12/29/2024 (Originally 9/1/2024)    RSV Vaccine - Adults (1 - 1-dose 75+ series) 12/27/2025 (Originally 5/29/2022)    TDAP/TD VACCINES (3 - Td or Tdap) 12/27/2025 (Originally 6/1/2024)    HEMOGLOBIN A1C  04/25/2025    BMI FOLLOWUP  06/21/2025    ANNUAL WELLNESS VISIT  12/27/2025    COLORECTAL CANCER SCREENING  06/17/2026    HEPATITIS C SCREENING  Completed     INFLUENZA VACCINE  Completed    Pneumococcal Vaccine 65+  Completed    MAMMOGRAM  Discontinued                                                                                                                                                CMS Preventative Services Quick Reference  Risk Factors Identified During Encounter  Fall Risk-High or Moderate: Discussed Fall Prevention in the home  Immunizations Discussed/Encouraged: Shingrix  Vision Screening Recommended    The above risks/problems have been discussed with the patient.  Pertinent information has been shared with the patient in the After Visit Summary.  An After Visit Summary and PPPS were made available to the patient.    Follow Up:   Next Medicare Wellness visit to be scheduled in 1 year.     Assessment & Plan  Medicare annual wellness visit, subsequent    Orders:    CBC & Differential; Future    Comprehensive Metabolic Panel; Future    Lipid Panel; Future    TSH Rfx On Abnormal To Free T4; Future    Essential hypertension      Orders:    amLODIPine (NORVASC) 5 MG tablet; Take 1 tablet by mouth Daily.    hydroCHLOROthiazide 25 MG tablet; Take 1 tablet by mouth Daily.    Hemoglobin A1c less than 7.0%         Generalized anxiety disorder  Psychological condition is stable.  Continue current treatment regimen.  Psychological condition  will be reassessed in 6 months.    Orders:    busPIRone (BUSPAR) 5 MG tablet; Take 1 PO 2-3 times daily    Mixed hyperlipidemia            Generalized arthritis         Migraine without status migrainosus, not intractable, unspecified migraine type                   Osteopenia of neck of left femur         Squamous cell skin cancer, face         Seborrheic eczema         Primary insomnia         Type 2 diabetes mellitus without complication, without long-term current use of insulin  Diabetes is stable.   Continue current treatment regimen.  Diabetes will be reassessed in 6 months    Orders:    MicroAlbumin, Urine, Random - Urine,  Clean Catch; Future    metFORMIN ER (GLUCOPHAGE-XR) 500 MG 24 hr tablet; Take 2 po in the am, 2 po in the pm    Hyperlipidemia, unspecified hyperlipidemia type       Orders:    atorvastatin (LIPITOR) 80 MG tablet; Take 1 tablet by mouth Daily.    Seasonal allergies         Postmenopausal    Orders:    DEXA Bone Density Axial; Future         Follow Up:   Return in about 6 months (around 6/27/2025) for Diabetes.

## 2024-12-27 NOTE — ASSESSMENT & PLAN NOTE
Orders:    amLODIPine (NORVASC) 5 MG tablet; Take 1 tablet by mouth Daily.    hydroCHLOROthiazide 25 MG tablet; Take 1 tablet by mouth Daily.

## 2024-12-31 ENCOUNTER — TRANSCRIBE ORDERS (OUTPATIENT)
Dept: ADMINISTRATIVE | Facility: HOSPITAL | Age: 79
End: 2024-12-31
Payer: MEDICARE

## 2024-12-31 DIAGNOSIS — Z12.31 VISIT FOR SCREENING MAMMOGRAM: Primary | ICD-10-CM

## 2025-01-24 ENCOUNTER — LAB (OUTPATIENT)
Dept: INTERNAL MEDICINE | Facility: CLINIC | Age: 80
End: 2025-01-24
Payer: MEDICARE

## 2025-01-24 DIAGNOSIS — Z00.00 MEDICARE ANNUAL WELLNESS VISIT, SUBSEQUENT: ICD-10-CM

## 2025-01-24 DIAGNOSIS — E11.9 TYPE 2 DIABETES MELLITUS WITHOUT COMPLICATION, WITHOUT LONG-TERM CURRENT USE OF INSULIN: ICD-10-CM

## 2025-01-24 LAB
ALBUMIN SERPL-MCNC: 4 G/DL (ref 3.5–5.2)
ALBUMIN UR-MCNC: 1.3 MG/DL
ALBUMIN/GLOB SERPL: 1.4 G/DL
ALP SERPL-CCNC: 83 U/L (ref 39–117)
ALT SERPL W P-5'-P-CCNC: 19 U/L (ref 1–33)
ANION GAP SERPL CALCULATED.3IONS-SCNC: 12 MMOL/L (ref 5–15)
AST SERPL-CCNC: 18 U/L (ref 1–32)
BASOPHILS # BLD AUTO: 0.03 10*3/MM3 (ref 0–0.2)
BASOPHILS NFR BLD AUTO: 0.4 % (ref 0–1.5)
BILIRUB SERPL-MCNC: 0.4 MG/DL (ref 0–1.2)
BUN SERPL-MCNC: 13 MG/DL (ref 8–23)
BUN/CREAT SERPL: 20 (ref 7–25)
CALCIUM SPEC-SCNC: 9.5 MG/DL (ref 8.6–10.5)
CHLORIDE SERPL-SCNC: 93 MMOL/L (ref 98–107)
CHOLEST SERPL-MCNC: 166 MG/DL (ref 0–200)
CO2 SERPL-SCNC: 29 MMOL/L (ref 22–29)
CREAT SERPL-MCNC: 0.65 MG/DL (ref 0.57–1)
DEPRECATED RDW RBC AUTO: 39 FL (ref 37–54)
EGFRCR SERPLBLD CKD-EPI 2021: 90.8 ML/MIN/1.73
EOSINOPHIL # BLD AUTO: 0.03 10*3/MM3 (ref 0–0.4)
EOSINOPHIL NFR BLD AUTO: 0.4 % (ref 0.3–6.2)
ERYTHROCYTE [DISTWIDTH] IN BLOOD BY AUTOMATED COUNT: 12.7 % (ref 12.3–15.4)
GLOBULIN UR ELPH-MCNC: 2.9 GM/DL
GLUCOSE SERPL-MCNC: 111 MG/DL (ref 65–99)
HCT VFR BLD AUTO: 38.2 % (ref 34–46.6)
HDLC SERPL-MCNC: 67 MG/DL (ref 40–60)
HGB BLD-MCNC: 13.1 G/DL (ref 12–15.9)
IMM GRANULOCYTES # BLD AUTO: 0.04 10*3/MM3 (ref 0–0.05)
IMM GRANULOCYTES NFR BLD AUTO: 0.5 % (ref 0–0.5)
LDLC SERPL CALC-MCNC: 87 MG/DL (ref 0–100)
LDLC/HDLC SERPL: 1.3 {RATIO}
LYMPHOCYTES # BLD AUTO: 1.77 10*3/MM3 (ref 0.7–3.1)
LYMPHOCYTES NFR BLD AUTO: 21.2 % (ref 19.6–45.3)
MCH RBC QN AUTO: 29.2 PG (ref 26.6–33)
MCHC RBC AUTO-ENTMCNC: 34.3 G/DL (ref 31.5–35.7)
MCV RBC AUTO: 85.1 FL (ref 79–97)
MONOCYTES # BLD AUTO: 0.67 10*3/MM3 (ref 0.1–0.9)
MONOCYTES NFR BLD AUTO: 8 % (ref 5–12)
NEUTROPHILS NFR BLD AUTO: 5.8 10*3/MM3 (ref 1.7–7)
NEUTROPHILS NFR BLD AUTO: 69.5 % (ref 42.7–76)
NRBC BLD AUTO-RTO: 0 /100 WBC (ref 0–0.2)
PLATELET # BLD AUTO: 350 10*3/MM3 (ref 140–450)
PMV BLD AUTO: 9.3 FL (ref 6–12)
POTASSIUM SERPL-SCNC: 3.7 MMOL/L (ref 3.5–5.2)
PROT SERPL-MCNC: 6.9 G/DL (ref 6–8.5)
RBC # BLD AUTO: 4.49 10*6/MM3 (ref 3.77–5.28)
SODIUM SERPL-SCNC: 134 MMOL/L (ref 136–145)
TRIGL SERPL-MCNC: 61 MG/DL (ref 0–150)
TSH SERPL DL<=0.05 MIU/L-ACNC: 1.26 UIU/ML (ref 0.27–4.2)
VLDLC SERPL-MCNC: 12 MG/DL (ref 5–40)
WBC NRBC COR # BLD AUTO: 8.34 10*3/MM3 (ref 3.4–10.8)

## 2025-01-24 PROCEDURE — 84443 ASSAY THYROID STIM HORMONE: CPT | Performed by: NURSE PRACTITIONER

## 2025-01-24 PROCEDURE — 36415 COLL VENOUS BLD VENIPUNCTURE: CPT | Performed by: NURSE PRACTITIONER

## 2025-01-24 PROCEDURE — 82043 UR ALBUMIN QUANTITATIVE: CPT | Performed by: NURSE PRACTITIONER

## 2025-01-24 PROCEDURE — 80053 COMPREHEN METABOLIC PANEL: CPT | Performed by: NURSE PRACTITIONER

## 2025-01-24 PROCEDURE — 80061 LIPID PANEL: CPT | Performed by: NURSE PRACTITIONER

## 2025-01-24 PROCEDURE — 85025 COMPLETE CBC W/AUTO DIFF WBC: CPT | Performed by: NURSE PRACTITIONER

## 2025-03-11 ENCOUNTER — READMISSION MANAGEMENT (OUTPATIENT)
Dept: CALL CENTER | Facility: HOSPITAL | Age: 80
End: 2025-03-11
Payer: MEDICARE

## 2025-03-11 ENCOUNTER — TRANSITIONAL CARE MANAGEMENT TELEPHONE ENCOUNTER (OUTPATIENT)
Dept: CALL CENTER | Facility: HOSPITAL | Age: 80
End: 2025-03-11
Payer: MEDICARE

## 2025-03-11 ENCOUNTER — TELEPHONE (OUTPATIENT)
Dept: INTERNAL MEDICINE | Facility: CLINIC | Age: 80
End: 2025-03-11
Payer: MEDICARE

## 2025-03-11 NOTE — OUTREACH NOTE
Prep Survey      Flowsheet Row Responses   [facility] from? Non-[facility]   Is LACE score < 7 ? Non-BH Discharge   Eligibility [facility]   Date of Discharge 03/09/25   Discharge diagnosis unknown   Does the patient have one of the following disease processes/diagnoses(primary or secondary)? Other   Prep survey completed? Yes            Shanel PENA - Registered Nurse

## 2025-03-11 NOTE — TELEPHONE ENCOUNTER
Caller: Mariaelena Easley     Relationship:      Best call back number: [phone]    What is your medical concern?     ADMITTED TO [facility] 3/7 AND DISCHARGED 3/9    STARTED OFF COLD AND FLU BUG  SHE WAS DOCTORING HERSELF WITH RADHIKA  FRIDAY MORNING FELT NAUSEOUS, HAD DIARRHEA AND VOMITING    HOSPITAL DOCTOR TOOK HER OFF      hydroCHLOROthiazide 25 MG tablet  FOR SEVEN DAYS     AND GAVE HER 7 DAY PRESCRIPTION FOR AUGMENTIN    CAN WE GET HER MEDICAL RECORDS FROM THERE?    PLEASE CALL PATIENT

## 2025-03-11 NOTE — OUTREACH NOTE
Call Center TCM Note      Flowsheet Row Responses   [facility] patient discharged from? Non-  [[facility]]   Does the patient have one of the following disease processes/diagnoses(primary or secondary)? Other   TCM attempt successful? Yes   Call start time 1600   Call end time 1620   Discharge diagnosis Dehydration   Person spoke with today (if not patient) and relationship Patient   Meds reviewed with patient/caregiver? Yes  [[facility] on hold for one week. Started on augmentin]   Does the patient have all medications ordered at discharge? Yes   Is the patient taking all medications as directed (includes completed medication regime)? Yes   Comments PCP [facility] appt scheduled for 3/17 1030am   Does the patient have an appointment with their PCP within 7-14 days of discharge? No   Nursing Interventions Assisted patient with making appointment per protocol, Routed TCM call to PCP office   Has home health visited the patient within 72 hours of discharge? N/A   Psychosocial issues? No   Did the patient receive a copy of their discharge instructions? Yes   What is the patient's perception of their health status since discharge? Improving   Is the patient/caregiver able to teach back signs and symptoms related to disease process for when to call PCP? Yes   Is the patient/caregiver able to teach back signs and symptoms related to disease process for when to call 911? Yes   Is the patient/caregiver able to teach back the hierarchy of who to call/visit for symptoms/problems? PCP, Specialist, Home health nurse, Urgent Care, ED, 911 Yes   If the patient is a current smoker, are they able to teach back resources for cessation? Not a smoker   TCM call completed? Yes   Call end time 1620   Would this patient benefit from a Referral to Amb Social Work? No   Is the patient interested in additional calls from an ambulatory ? No            Bambi Donohue, RN    3/11/2025,  16:20 EDT

## 2025-03-17 ENCOUNTER — OFFICE VISIT (OUTPATIENT)
Dept: INTERNAL MEDICINE | Facility: CLINIC | Age: 80
End: 2025-03-17
Payer: MEDICARE

## 2025-03-17 ENCOUNTER — LAB (OUTPATIENT)
Dept: INTERNAL MEDICINE | Facility: CLINIC | Age: 80
End: 2025-03-17
Payer: MEDICARE

## 2025-03-17 VITALS
BODY MASS INDEX: 27.79 KG/M2 | HEART RATE: 75 BPM | SYSTOLIC BLOOD PRESSURE: 118 MMHG | TEMPERATURE: 97.2 F | WEIGHT: 151 LBS | DIASTOLIC BLOOD PRESSURE: 70 MMHG | HEIGHT: 62 IN | OXYGEN SATURATION: 99 %

## 2025-03-17 DIAGNOSIS — R23.1 PALE: ICD-10-CM

## 2025-03-17 DIAGNOSIS — Z09 HOSPITAL DISCHARGE FOLLOW-UP: Primary | ICD-10-CM

## 2025-03-17 DIAGNOSIS — E87.1 HYPONATREMIA: ICD-10-CM

## 2025-03-17 DIAGNOSIS — R10.84 GENERALIZED ABDOMINAL PAIN: ICD-10-CM

## 2025-03-17 DIAGNOSIS — R19.7 DIARRHEA, UNSPECIFIED TYPE: ICD-10-CM

## 2025-03-17 DIAGNOSIS — R11.2 NAUSEA AND VOMITING, UNSPECIFIED VOMITING TYPE: ICD-10-CM

## 2025-03-17 DIAGNOSIS — R53.83 LOW ENERGY: ICD-10-CM

## 2025-03-17 LAB
ANION GAP SERPL CALCULATED.3IONS-SCNC: 12.2 MMOL/L (ref 5–15)
BASOPHILS # BLD AUTO: 0.06 10*3/MM3 (ref 0–0.2)
BASOPHILS NFR BLD AUTO: 0.7 % (ref 0–1.5)
BUN SERPL-MCNC: 19 MG/DL (ref 8–23)
BUN/CREAT SERPL: 24.4 (ref 7–25)
CALCIUM SPEC-SCNC: 9.5 MG/DL (ref 8.6–10.5)
CHLORIDE SERPL-SCNC: 97 MMOL/L (ref 98–107)
CO2 SERPL-SCNC: 26.8 MMOL/L (ref 22–29)
CREAT SERPL-MCNC: 0.78 MG/DL (ref 0.57–1)
DEPRECATED RDW RBC AUTO: 41.1 FL (ref 37–54)
EGFRCR SERPLBLD CKD-EPI 2021: 78.3 ML/MIN/1.73
EOSINOPHIL # BLD AUTO: 0.14 10*3/MM3 (ref 0–0.4)
EOSINOPHIL NFR BLD AUTO: 1.7 % (ref 0.3–6.2)
ERYTHROCYTE [DISTWIDTH] IN BLOOD BY AUTOMATED COUNT: 12.6 % (ref 12.3–15.4)
GLUCOSE SERPL-MCNC: 113 MG/DL (ref 65–99)
HCT VFR BLD AUTO: 39.7 % (ref 34–46.6)
HGB BLD-MCNC: 12.9 G/DL (ref 12–15.9)
IMM GRANULOCYTES # BLD AUTO: 0.03 10*3/MM3 (ref 0–0.05)
IMM GRANULOCYTES NFR BLD AUTO: 0.4 % (ref 0–0.5)
LYMPHOCYTES # BLD AUTO: 2.18 10*3/MM3 (ref 0.7–3.1)
LYMPHOCYTES NFR BLD AUTO: 25.9 % (ref 19.6–45.3)
MCH RBC QN AUTO: 29 PG (ref 26.6–33)
MCHC RBC AUTO-ENTMCNC: 32.5 G/DL (ref 31.5–35.7)
MCV RBC AUTO: 89.2 FL (ref 79–97)
MONOCYTES # BLD AUTO: 0.64 10*3/MM3 (ref 0.1–0.9)
MONOCYTES NFR BLD AUTO: 7.6 % (ref 5–12)
NEUTROPHILS NFR BLD AUTO: 5.36 10*3/MM3 (ref 1.7–7)
NEUTROPHILS NFR BLD AUTO: 63.7 % (ref 42.7–76)
NRBC BLD AUTO-RTO: 0 /100 WBC (ref 0–0.2)
PLATELET # BLD AUTO: 417 10*3/MM3 (ref 140–450)
PMV BLD AUTO: 9.6 FL (ref 6–12)
POTASSIUM SERPL-SCNC: 3.9 MMOL/L (ref 3.5–5.2)
RBC # BLD AUTO: 4.45 10*6/MM3 (ref 3.77–5.28)
SODIUM SERPL-SCNC: 136 MMOL/L (ref 136–145)
WBC NRBC COR # BLD AUTO: 8.41 10*3/MM3 (ref 3.4–10.8)

## 2025-03-17 PROCEDURE — 85025 COMPLETE CBC W/AUTO DIFF WBC: CPT | Performed by: NURSE PRACTITIONER

## 2025-03-17 PROCEDURE — 82607 VITAMIN B-12: CPT | Performed by: NURSE PRACTITIONER

## 2025-03-17 PROCEDURE — 80048 BASIC METABOLIC PNL TOTAL CA: CPT | Performed by: NURSE PRACTITIONER

## 2025-03-17 PROCEDURE — 36415 COLL VENOUS BLD VENIPUNCTURE: CPT | Performed by: NURSE PRACTITIONER

## 2025-03-17 NOTE — PROGRESS NOTES
"Chief Complaint   Patient presents with    Diarrhea    Vomiting     Hospital follow-up         HPI  Mariaelena Easley is a 79 y.o. female presents for a hospital follow-up.  She was admitted from 3/7-3/10/25 at [facility].  She states she was admitted for N/V/D and hyponatremia.  She states she went to the ED because she \"felt like she was dying\". She was given IVFs.  Her respiratory panel was negative.    The following portions of the patient's history were reviewed and updated as appropriate: allergies, current medications, past family history, past medical history, past social history, past surgical history, and problem list.    Subjective  Review of Systems   Constitutional:  Negative for fever.   Gastrointestinal:  Positive for abdominal pain, diarrhea, nausea and vomiting.       Objective  Visit Vitals  /70 (BP Location: Left arm, Patient Position: Sitting)   Pulse 75   Temp 97.2 °F (36.2 °C)   Ht 157.5 cm (62\")   Wt 68.5 kg (151 lb)   LMP  (LMP Unknown)   SpO2 99%   BMI 27.62 kg/m²        Physical Exam  Vitals and nursing note reviewed.   HENT:      Head: Normocephalic.   Eyes:      Pupils: Pupils are equal, round, and reactive to light.   Cardiovascular:      Rate and Rhythm: Normal rate and regular rhythm.      Pulses: Normal pulses.      Heart sounds: Normal heart sounds.   Pulmonary:      Effort: Pulmonary effort is normal. No respiratory distress.      Breath sounds: Normal breath sounds.   Abdominal:      General: Bowel sounds are normal. There is no distension.      Palpations: Abdomen is soft.      Tenderness: There is no abdominal tenderness.   Skin:     General: Skin is warm and dry.      Capillary Refill: Capillary refill takes less than 2 seconds.      Coloration: Skin is not pale.   Neurological:      General: No focal deficit present.      Mental Status: She is alert and oriented to person, place, and time.      Gait: Gait is intact.   Psychiatric:         Attention and Perception: " Attention normal.         Mood and Affect: Mood normal.         Behavior: Behavior normal.         Thought Content: Thought content normal.         Judgment: Judgment normal.          Procedures     Assessment and Plan  Diagnoses and all orders for this visit:    1. Hospital discharge follow-up (Primary)    2. Diarrhea, unspecified type    3. Nausea and vomiting, unspecified vomiting type    4. Hyponatremia  -     Basic metabolic panel    5. Pale  -     CBC w AUTO Differential; Future    6. Generalized abdominal pain    7. Low energy  -     Vitamin B12    No d/c summary available for review  Recheck sodium and CBC  Check B12 due to severity of fatigue  N/V/D has resolved    Return for Next scheduled follow up.        Huber Ortiz, APRN

## 2025-03-18 LAB — VIT B12 BLD-MCNC: 519 PG/ML (ref 211–946)

## 2025-03-27 ENCOUNTER — HOSPITAL ENCOUNTER (OUTPATIENT)
Dept: MAMMOGRAPHY | Facility: HOSPITAL | Age: 80
Discharge: HOME | End: 2025-03-27
Admitting: NURSE PRACTITIONER
Payer: MEDICARE

## 2025-03-27 DIAGNOSIS — Z12.31 VISIT FOR SCREENING MAMMOGRAM: ICD-10-CM

## 2025-03-27 PROCEDURE — 77063 BREAST TOMOSYNTHESIS BI: CPT

## 2025-03-27 PROCEDURE — 77067 SCR MAMMO BI INCL CAD: CPT

## 2025-04-11 RX ORDER — BLOOD SUGAR DIAGNOSTIC
STRIP MISCELLANEOUS
Qty: 300 EACH | Refills: 3 | Status: SHIPPED | OUTPATIENT
Start: 2025-04-11

## 2025-04-23 ENCOUNTER — APPOINTMENT (OUTPATIENT)
Dept: BONE DENSITY | Facility: HOSPITAL | Age: 80
End: 2025-04-23
Payer: MEDICARE

## 2025-04-23 DIAGNOSIS — Z78.0 POSTMENOPAUSAL: ICD-10-CM

## 2025-04-23 PROCEDURE — 77080 DXA BONE DENSITY AXIAL: CPT

## 2025-04-25 NOTE — PROGRESS NOTES
[facility] Cardiology  Subjective:     Encounter Date: 2025      Patient ID: Mariaelena Easley is a 79 y.o. female.    Chief Complaint: Chest pain annual f/u      PROBLEM LIST:  Chest pain  MPS, 2018: EF of 66% very small sized, mildly severe area of ischemia in the lateral wall.  Low risk study.   Stress MPS, 2020: Myocardial perfusion imaging indicates a small sized infarct located in the lateral wall with no significant ischemia noted. EF 57%.   US nonvascular extremity, 10/08/2020: 2.5 cm lipoma in the area of the palpable abnormality.   2020 MPS no significant change from 2019.  Hypertension  Dyslipidemia  Borderline diabetes  Eczema  Surgeries:  Bilateral breast augmentation  Right breast biopsy   section  Trigger point injection  Wrist surgery      History of Present Illness  Mariaelena Easley returns today for a follow up with a history of cardiac risk factors. Since last visit, patient has been doing well overall from a cardiovascular standpoint. She was recently in the ER at [facility] due to feeling some nausea/diarrhea and was not replenishing herself well. She is unsure of the cause of these symptoms. She reports she does not have an exercise routine. Patient denies chest pain, orthopnea, palpitations, edema, dizziness, and syncope.     Allergies   Allergen Reactions    Codeine Nausea Only and Dizziness    Sulfa Antibiotics Rash         Current Outpatient Medications:     amLODIPine (NORVASC) 5 MG tablet, Take 1 tablet by mouth Daily., Disp: 90 tablet, Rfl: 3    atorvastatin (LIPITOR) 80 MG tablet, Take 1 tablet by mouth Daily., Disp: 90 tablet, Rfl: 3    busPIRone (BUSPAR) 5 MG tablet, Take 1 PO 2-3 times daily, Disp: 270 tablet, Rfl: 3    Calcium 500-100 MG-UNIT chewable tablet, Chew 1 tablet Daily., Disp: , Rfl:     Coenzyme Q10 30 MG/5ML liquid, Take 5 mL by mouth Daily., Disp: , Rfl:     glucose blood (OneTouch Verio) test strip, USE TO TEST 2-3  "TIMES DAILY, Disp: 300 each, Rfl: 3    hydroCHLOROthiazide 25 MG tablet, Take 1 tablet by mouth Daily., Disp: 90 tablet, Rfl: 3    Lancets (OneTouch Delica Plus [id number]) misc, USE AND DISCARD LANCET TO  CHECK BLOOD SUGAR TWO TIMESA DAY, Disp: 200 each, Rfl: 1    metFORMIN ER (GLUCOPHAGE-XR) 500 MG 24 hr tablet, Take 2 po in the am, 2 po in the pm, Disp: 360 tablet, Rfl: 3    multivitamin with minerals tablet tablet, Take 1 tablet by mouth Daily., Disp: , Rfl:     rizatriptan MLT (Maxalt-MLT) 10 MG disintegrating tablet, Place 1 tablet on the tongue 1 (One) Time As Needed for Migraine for up to 1 dose. May repeat in 2 hours if needed, Disp: 9 tablet, Rfl: 1    The following portions of the patient's history were reviewed and updated as appropriate: allergies, current medications, past family history, past medical history, past social history, past surgical history and problem list.    ROS       Objective:     Vitals:    04/28/25 1557   BP: 138/74   BP Location: Right arm   Patient Position: Sitting   Cuff Size: Adult   Pulse: 78   SpO2: 96%   Weight: 69.2 kg (152 lb 9.6 oz)   Height: 162.6 cm (64\")         Vitals reviewed.   Constitutional:       Appearance: Well-developed and not in distress.   Neck:      Thyroid: No thyromegaly.      Vascular: No carotid bruit or JVD.   Pulmonary:      Breath sounds: Normal breath sounds.   Cardiovascular:      Regular rhythm.      No gallop. No S3 and S4 gallop.   Pulses:     Intact distal pulses.      Carotid: 2+ bilaterally.     Radial: 2+ bilaterally.  Edema:     Peripheral edema absent.   Abdominal:      General: Bowel sounds are normal.      Palpations: Abdomen is soft. There is no abdominal mass.      Tenderness: There is no abdominal tenderness.   Musculoskeletal:         General: No deformity.      Extremities: No clubbing present.Skin:     General: Skin is warm and dry.      Findings: No rash.   Neurological:      Mental Status: Alert and oriented to person, place, and " time.         Lab Review:  Lab Results   Component Value Date    GLUCOSE 113 (H) 03/17/2025    BUN 19 03/17/2025    CREATININE 0.78 03/17/2025    BCR 24.4 03/17/2025    K 3.9 03/17/2025    CO2 26.8 03/17/2025    CALCIUM 9.5 03/17/2025    ALBUMIN 4.0 01/24/2025    ALKPHOS 83 01/24/2025    AST 18 01/24/2025    ALT 19 01/24/2025     Lab Results   Component Value Date    CHOL 166 01/24/2025    TRIG 61 01/24/2025    HDL 67 (H) 01/24/2025    LDL 87 01/24/2025      Lab Results   Component Value Date    WBC 8.41 03/17/2025    RBC 4.45 03/17/2025    HGB 12.9 03/17/2025    HCT 39.7 03/17/2025    MCV 89.2 03/17/2025     03/17/2025     Lab Results   Component Value Date    HGBA1C 6.7 (A) 10/25/2024        Procedures      Advance Care Planning   ACP discussion was held with the patient during this visit. Patient does not have an advance directive, declines further assistance.           Assessment:   Diagnoses and all orders for this visit:    1. Chest pain, unspecified type (Primary)    2. Essential hypertension    3. Hyperlipidemia, unspecified hyperlipidemia type        Impression:  1. Chest pain. Stable with no angina.     2. Essential hypertension. Acceptable control. Continue on amlodipine 5 mg daily for hypertension. Continue on hydrochlorothiazide 25 mg daily for hypertension.    3. Hyperlipidemia. Controlled. Continue on atorvastatin 80 mg daily for hyperlipidemia.     Plan:  Stable cardiac status.   Continue current medications.  Revisit in 12 MO, or sooner as needed.        Scribed for Raymond Mcdaniel MD by Sara Philippe. 4/28/2025  17:18 EDT  Raymond Mcdaniel MD      Please note that portions of this note may have been completed with a voice recognition program. Efforts were made to edit the dictations, but occasionally words are mistranscribed.

## 2025-04-28 ENCOUNTER — OFFICE VISIT (OUTPATIENT)
Dept: CARDIOLOGY | Facility: CLINIC | Age: 80
End: 2025-04-28
Payer: MEDICARE

## 2025-04-28 VITALS
DIASTOLIC BLOOD PRESSURE: 74 MMHG | OXYGEN SATURATION: 96 % | HEART RATE: 78 BPM | SYSTOLIC BLOOD PRESSURE: 138 MMHG | BODY MASS INDEX: 26.05 KG/M2 | HEIGHT: 64 IN | WEIGHT: 152.6 LBS

## 2025-04-28 DIAGNOSIS — I10 ESSENTIAL HYPERTENSION: ICD-10-CM

## 2025-04-28 DIAGNOSIS — E78.5 HYPERLIPIDEMIA, UNSPECIFIED HYPERLIPIDEMIA TYPE: ICD-10-CM

## 2025-04-28 DIAGNOSIS — R07.9 CHEST PAIN, UNSPECIFIED TYPE: Primary | ICD-10-CM

## 2025-04-28 PROCEDURE — 1159F MED LIST DOCD IN RCRD: CPT | Performed by: INTERNAL MEDICINE

## 2025-04-28 PROCEDURE — 99213 OFFICE O/P EST LOW 20 MIN: CPT | Performed by: INTERNAL MEDICINE

## 2025-04-28 PROCEDURE — 3075F SYST BP GE 130 - 139MM HG: CPT | Performed by: INTERNAL MEDICINE

## 2025-04-28 PROCEDURE — 1160F RVW MEDS BY RX/DR IN RCRD: CPT | Performed by: INTERNAL MEDICINE

## 2025-04-28 PROCEDURE — 3078F DIAST BP <80 MM HG: CPT | Performed by: INTERNAL MEDICINE

## 2025-05-15 ENCOUNTER — OFFICE VISIT (OUTPATIENT)
Dept: ORTHOPEDIC SURGERY | Facility: CLINIC | Age: 80
End: 2025-05-15
Payer: MEDICARE

## 2025-05-15 VITALS
BODY MASS INDEX: 26.29 KG/M2 | WEIGHT: 154 LBS | SYSTOLIC BLOOD PRESSURE: 122 MMHG | HEIGHT: 64 IN | DIASTOLIC BLOOD PRESSURE: 84 MMHG

## 2025-05-15 DIAGNOSIS — R20.0 NUMBNESS AND TINGLING OF RIGHT HAND: ICD-10-CM

## 2025-05-15 DIAGNOSIS — M75.52 BURSITIS OF BOTH SHOULDERS: ICD-10-CM

## 2025-05-15 DIAGNOSIS — R20.2 NUMBNESS AND TINGLING OF RIGHT HAND: ICD-10-CM

## 2025-05-15 DIAGNOSIS — M75.51 BURSITIS OF BOTH SHOULDERS: ICD-10-CM

## 2025-05-15 DIAGNOSIS — S46.001A INJURY OF RIGHT ROTATOR CUFF, INITIAL ENCOUNTER: ICD-10-CM

## 2025-05-15 DIAGNOSIS — M25.511 RIGHT SHOULDER PAIN, UNSPECIFIED CHRONICITY: ICD-10-CM

## 2025-05-15 DIAGNOSIS — M75.122 NONTRAUMATIC COMPLETE TEAR OF LEFT ROTATOR CUFF: Primary | ICD-10-CM

## 2025-05-15 DIAGNOSIS — M75.102 ROTATOR CUFF SYNDROME, LEFT: ICD-10-CM

## 2025-05-15 DIAGNOSIS — M75.41 IMPINGEMENT SYNDROME OF RIGHT SHOULDER: ICD-10-CM

## 2025-05-15 RX ORDER — TRIAMCINOLONE ACETONIDE 40 MG/ML
40 INJECTION, SUSPENSION INTRA-ARTICULAR; INTRAMUSCULAR
Status: COMPLETED | OUTPATIENT
Start: 2025-05-15 | End: 2025-05-15

## 2025-05-15 RX ORDER — LIDOCAINE HYDROCHLORIDE 10 MG/ML
5 INJECTION, SOLUTION EPIDURAL; INFILTRATION; INTRACAUDAL; PERINEURAL
Status: COMPLETED | OUTPATIENT
Start: 2025-05-15 | End: 2025-05-15

## 2025-05-15 RX ADMIN — TRIAMCINOLONE ACETONIDE 40 MG: 40 INJECTION, SUSPENSION INTRA-ARTICULAR; INTRAMUSCULAR at 14:52

## 2025-05-15 RX ADMIN — LIDOCAINE HYDROCHLORIDE 5 ML: 10 INJECTION, SOLUTION EPIDURAL; INFILTRATION; INTRACAUDAL; PERINEURAL at 14:52

## 2025-05-15 NOTE — PROGRESS NOTES
[facility] [place] Orthopaedic Surgery Office Visit - Morales Costello MD  [facility]    Office Visit       Patient Name: Mariaelena Easley    Chief Complaint:   Chief Complaint   Patient presents with    Right Shoulder - Pain    Left Shoulder - Pain       Referring Physician: No ref. provider found  - I appreciate the referral    History of Present Illness:   Mariaelena Easley is a 79 y.o. female who presents with bilateral body part: shoulder Reason: pain.  Onset:Onset: atraumatic and gradual in nature. The issue has been ongoing for 3 year(s). Pain is a 6/10 on the pain scale. Pain is described as Pain Characterization: dull and aching. Associated symptoms include Symptoms: pain. The pain is worse with sleeping; resting improve the pain. Previous treatments have included: steroid injection (last injection Left shoulder 12/16/2024), nothing on right side. I have reviewed the patient's history of present illness as noted/entered above.    I have reviewed the patient's past medical history, surgical history, social history, family history, medications, and allergies as noted in the electronic medical record and as noted/entered.  I have reviewed the patient's review of systems as noted/enter and updated as noted in the patient's HPI.      RIGHT worse than left shoulder pain  Injections left shoulder with Dr. Amaya helped in the past    LEFT SHOULDER MRI [address] Open MRI -- full thickness rotator cuff tearing  Inferior deltoid possible lipoma -- multiple providers have evaluated and stable she notes    [place]    Right arm nonspecific pathology counseled possible neck involvement she does report numbness and tingling and fairly diffuse symptoms throughout the arm some isolated to the shoulder.  Discussed recommend an EMG/NCV and referral to one of our hand colleagues for possible carpal tunnel syndrome as well.    Son,  Abelardo, was punter at  and his daughter is golfer at       79 y.o. female  Body mass index is 26.42 kg/m².    Subjective   Subjective      Review of Systems   Constitutional: Negative.  Negative for chills, fatigue and fever.   HENT: Negative.  Negative for congestion and dental problem.    Eyes: Negative.  Negative for blurred vision.   Respiratory: Negative.  Negative for shortness of breath.    Cardiovascular: Negative.  Negative for leg swelling.   Gastrointestinal: Negative.  Negative for abdominal pain.   Endocrine: Negative.  Negative for polyuria.   Genitourinary: Negative.  Negative for difficulty urinating.   Musculoskeletal:  Positive for arthralgias.   Skin: Negative.    Allergic/Immunologic: Negative.    Neurological: Negative.    Hematological: Negative.  Negative for adenopathy.   Psychiatric/Behavioral: Negative.  Negative for behavioral problems.         Past Medical History:   Past Medical History:   Diagnosis Date    Abnormal ECG 2022    Arthritis ?    fingers    Cancer     Cataract 20??    haven't had surgery yet.    CTS (carpal tunnel syndrome)     Cyst of uterus     Diabetes mellitus     Eczema     Headache 199??    Silent Migraines infrequent    Hyperlipidemia     Hypertension     Low back pain 202??    scheduled to see Dr. Britton for back 2024    Low back strain presently - off & on    Rotator cuff syndrome presently    Seborrheic dermatitis     Thyroid disease        Past Surgical History:   Past Surgical History:   Procedure Laterality Date    AUGMENTATION MAMMAPLASTY Bilateral     BREAST EXCISIONAL BIOPSY Right      SECTION      COLONOSCOPY  ??    Dr. Thapa     HAND SURGERY  's    TRIGGER POINT INJECTION  's    WRIST SURGERY  's       Family History:   Family History   Problem Relation Age of Onset    Heart disease Mother     Arthritis Father     Heart attack Father     Skin cancer Father     COPD Father     Cancer Father         Skin     Lung cancer Sister     Myelodysplastic syndrome Sister     Broken bones Sister         fell off a ladder    Cancer Sister         Lower Lobe Left Lung    Diabetes Brother     Heart attack Brother     Hyperlipidemia Brother     Cancer Brother         Mouth & Neck/Agent [employer] [place]    Rheumatologic disease Paternal Grandmother         many years in wheelchair 0's    Early death Brother         SIDS?? 1 day old     Early death Brother         Swimming accident @ 17 years old     Anemia Maternal Grandfather     Anemia Sister     Breast cancer Neg Hx     Ovarian cancer Neg Hx        Social History:   Social History     Socioeconomic History    Marital status:    Tobacco Use    Smoking status: Former     Current packs/day: 0.00     Average packs/day: 0.5 packs/day for 3.2 years (1.6 ttl pk-yrs)     Types: Cigarettes     Start date: 1966     Quit date: 9/15/1969     Years since quittin.7    Smokeless tobacco: Never   Vaping Use    Vaping status: Never Used   Substance and Sexual Activity    Alcohol use: Yes     Alcohol/week: 1.0 standard drink of alcohol     Types: 1 Glasses of wine per week     Comment: Very seldom.....at dinner socially...mostly not even a month    Drug use: No    Sexual activity: Not Currently     Partners: Male     Comment: IUD - Failed, had son   had vasectomy.       Medications:   Current Outpatient Medications:     amLODIPine (NORVASC) 5 MG tablet, Take 1 tablet by mouth Daily., Disp: 90 tablet, Rfl: 3    atorvastatin (LIPITOR) 80 MG tablet, Take 1 tablet by mouth Daily., Disp: 90 tablet, Rfl: 3    busPIRone (BUSPAR) 5 MG tablet, Take 1 PO 2-3 times daily, Disp: 270 tablet, Rfl: 3    Calcium 500-100 MG-UNIT chewable tablet, Chew 1 tablet Daily., Disp: , Rfl:     Coenzyme Q10 30 MG/5ML liquid, Take 5 mL by mouth Daily., Disp: , Rfl:     glucose blood (OneTouch Verio) test strip, USE TO TEST 2-3 TIMES DAILY, Disp: 300 each, Rfl: 3    hydroCHLOROthiazide  "25 MG tablet, Take 1 tablet by mouth Daily., Disp: 90 tablet, Rfl: 3    Lancets (OneTouch Delica Plus [id number]) misc, USE AND DISCARD LANCET TO  CHECK BLOOD SUGAR TWO TIMESA DAY, Disp: 200 each, Rfl: 1    metFORMIN ER (GLUCOPHAGE-XR) 500 MG 24 hr tablet, Take 2 po in the am, 2 po in the pm, Disp: 360 tablet, Rfl: 3    multivitamin with minerals tablet tablet, Take 1 tablet by mouth Daily., Disp: , Rfl:     rizatriptan MLT (Maxalt-MLT) 10 MG disintegrating tablet, Place 1 tablet on the tongue 1 (One) Time As Needed for Migraine for up to 1 dose. May repeat in 2 hours if needed, Disp: 9 tablet, Rfl: 1    Allergies:   Allergies   Allergen Reactions    Codeine Nausea Only and Dizziness    Sulfa Antibiotics Rash       The following portions of the patient's history were reviewed and updated as appropriate: allergies, current medications, past family history, past medical history, past social history, past surgical history and problem list.        Objective    Objective      Vital Signs:   Vitals:    05/15/25 1356   BP: 122/84   Weight: 69.9 kg (154 lb)   Height: 162.6 cm (64.02\")       Ortho Exam:  RIGHT worse than left shoulder pain    General: no acute distress, comfortable  Vitals reviewed in chart    Musculoskeletal Exam    SIDE: Bilateral shoulder pain right worse than left.  Shoulder Exam:  Tenderness: Subacromial pain    Range of motion measurements (degrees)  Forward flexion/Abduction/External rotation at side/ER at 90/IR at 90/IR position  Active: 150/150/50/80/70  Passive: same    Painful arc of motion: yes  No evidence of septic joint  Pain with forward flexion and abduction greater: 90 degrees  Impingement testing Neer's test - positive/painful  Impingement testing Hawkin's test - positive/painful    Rotator Cuff Testing:  Good deltoid compensation bilaterally  Scapular dyskinesis - present, abnormal scapular motion    Biceps testing (biceps tenderness to palpation, Positive Cedillo's test for biceps, " Positive Speed's test, Positive uppercut test): Mild      Results Review:   Imaging Results (Last 24 Hours)       Procedure Component Value Units Date/Time    XR Shoulder 2+ View Bilateral [[id number]] Resulted: 05/15/25 1434     Updated: 05/15/25 1434    Narrative:      Imaging: shoulder x-rays 3 views - AP, axillary, and scapular-Y x-ray   views    Side: Bilateral    Indication for shoulder x-ray 3 views: shoulder pain, bilateral    Comparison: left 11/2/2023 comparison views available    Findings: No acute bony pathology. No superior humeral head migration.    The humeral head remains centered in the glenohumeral joint.     Bilateral shoulders show baseline degenerative changes particularly at the   AC joint.  The left shows more advanced arthritic findings relative to the   right with small inferior humeral head osteophyte and small inferior   glenoid osteophyte noted on the left shoulder.    I personally reviewed the above x-rays.            Procedures     Bilateral SHOULDER SUBACROMIAL SPACE INJECTION: Risks and benefits of a shoulder subacromial space injection were discussed and the patient desired to proceed. Verbal consent was obtained. The patient understood the risk of infection, potential skin changes, bump in blood glucose especially with diabetes, nerve injury, possibility of increased pain in the short term, and possible incomplete pain relief.  Using sterile technique, the shoulder subacromial space was injected from a posterior approach with 1mL of 40 mg triamcinolone acetonide 40 MG/ML and 5cc of lidocaine with aspiration prior to injection. The patient tolerated the procedure without difficulty.  CPT CODE 78586 for major joint aspiration/injection          Assessment / Plan      Assessment/Plan:   Problem List Items Addressed This Visit          Musculoskeletal and Injuries    Rotator cuff syndrome, left    Relevant Orders    XR Shoulder 2+ View Bilateral (Completed)    Nontraumatic complete tear  of left rotator cuff - Primary    Bursitis of both shoulders    Impingement syndrome of right shoulder    Injury of right rotator cuff    Right shoulder pain    Relevant Orders    XR Shoulder 2+ View Bilateral (Completed)       Symptoms and Signs    Numbness and tingling of right hand       RIGHT SHOULDER worse than left shoulder  Known rotator cuff tearing on left shoulder  Patient reports fairly diffuse nonspecific right upper extremity findings.  Counseled she may have neck involvement.  She may also have carpal tunnel syndrome developing as she has had multiple trigger fingers operated on in the past.  She reports more hand numbness and tingling so I do recommend a right upper extremity EMG/NCV.    EMG/NCV of the RIGHT upper extremity is recommended.  Indication for EMG/NCV: Right upper extremity primarily hand numbness and tingling concern for possible carpal tunnel syndrome.  EMG/NCV is critical to evaluate for ongoing neurological findings on examination and based on history to evaluate.  EMG/NCV will be critical to help direct the patient's care regarding ongoing neurological findings.    Referral to hand surgery for suspected carpal tunnel syndrome EMG/NCV ordered and hand referral following study completion.      Follow Up: She will keep me updated pending progress with regards to the shoulders we will get the nerve study and have her follow-up with one of our hand providers         Morales Costello MD, FAAOS  Orthopedic Surgeon, Shoulder Surgery  [facility]  Orthopedics and Sports Medicine  [address], [address]  [place] [address]    & New Location:  [facility] Location  [address], [address]  [place] [address]    05/15/25  15:24 EDT

## 2025-05-15 NOTE — PROGRESS NOTES
Procedure   - Large Joint Arthrocentesis: bilateral subacromial bursa on 5/15/2025 2:52 PM  Indications: pain  Details: 21 G needle, posterior approach  Medications (Right): 5 mL lidocaine PF 1% 1 %; 40 mg triamcinolone acetonide 40 MG/ML  Medications (Left): 5 mL lidocaine PF 1% 1 %; 40 mg triamcinolone acetonide 40 MG/ML  Outcome: tolerated well, no immediate complications  Procedure, treatment alternatives, risks and benefits explained, specific risks discussed. Consent was given by the patient. Immediately prior to procedure a time out was called to verify the correct patient, procedure, equipment, support staff and site/side marked as required. Patient was prepped and draped in the usual sterile fashion.

## 2025-05-19 ENCOUNTER — TELEPHONE (OUTPATIENT)
Dept: ORTHOPEDIC SURGERY | Facility: CLINIC | Age: 80
End: 2025-05-19
Payer: MEDICARE

## 2025-05-19 NOTE — TELEPHONE ENCOUNTER
Patient called in about the order for her EMG/NCV. It looks like it was never put in.     Please advise, I could potentially get her in this week or next with Carlos @ [facility].

## 2025-06-05 ENCOUNTER — OFFICE VISIT (OUTPATIENT)
Dept: ORTHOPEDIC SURGERY | Facility: CLINIC | Age: 80
End: 2025-06-05
Payer: MEDICARE

## 2025-06-05 VITALS
BODY MASS INDEX: 26.29 KG/M2 | HEIGHT: 64 IN | DIASTOLIC BLOOD PRESSURE: 70 MMHG | WEIGHT: 154 LBS | SYSTOLIC BLOOD PRESSURE: 126 MMHG

## 2025-06-05 DIAGNOSIS — G56.21 CUBITAL TUNNEL SYNDROME ON RIGHT: ICD-10-CM

## 2025-06-05 DIAGNOSIS — G56.01 CARPAL TUNNEL SYNDROME OF RIGHT WRIST: Primary | ICD-10-CM

## 2025-06-05 NOTE — PROGRESS NOTES
"                                                                 [facility] Orthopedic     Office Visit       Date: 06/05/2025   Patient Name: Mariaelena Easley  MRN: [id number]  YOB: 1945    Referring Physician: Morales Costello MD     Chief Complaint:   Chief Complaint   Patient presents with    Right Wrist - Pain     EMG completed 5/20/25     History of Present Illness:   Mariaelena Easley is a 80 y.o. female right-hand-dominant presented clinic as new patient complaints of right upper extremity pain.  Patient has a longstanding history of pain and symptoms to bilateral upper extremities.  She states had pain for several years but over the past several months she had increased pain to her left shoulder.  This improved somewhat with a nonoperative course, however she then developed symptoms into the right upper extremity.  She now reports nonspecific pain to the right shoulder, arm, forearm and hand.  She denies any specific numbness or tingling to the hand but does report having an incident several weeks ago that resulted in hand altered sensation that improved with time.  She does not awaken at night with symptoms.  She denies any neck pain currently.  No other complaints.    Subjective   Review of Systems:   Review of Systems   Pertinent review of systems per HPI    I reviewed the patient's chief complaint, history of present illness, review of systems, past medical history, surgical history, family history, social history, medications and allergy list in the EMR on 06/05/2025 and agree with the findings above.    Objective    Vital Signs:   Vitals:    06/05/25 1152   BP: 126/70   Weight: 69.9 kg (154 lb)   Height: 162.6 cm (64\")     General: No acute distress. Alert and oriented.   Cardiovascular: Palpable radial pulse.   Respiratory: Breathing is nonlabored.   Ortho Exam:  Examination of the right Upper Extremity:  No skin lesions or ecchymosis. No edema.    Interosseous wasting is not " visualized   Thenar atrophy is not visualized     Hypothenar atrophy is not visualized     negative Tinel's at the carpal tunnel, positive Durkan's compression test, positive Phalen's maneuver   negative Tinel's over Guyon's canal   negative Tinel's at the ulnar nerve at the elbow   negative Elbow Flexion Test   negative Subluxation of the ulnar nerve   negative tenderness with deep palpation of the pronator   negative Tinel's with median nerve at pronator   5/5 APB strength   5/5 FDI strength   Sensation is intact to light touch over the median and ulnar nerve distributions   Digits warm and well-perfused      Imaging / Studies:    Imaging Results (Last 24 Hours)       ** No results found for the last 24 hours. **        EMG nerve conduction study performed on 5/22/2025 demonstrates very mild right carpal tunnel syndrome and mild right cubital tunnel syndrome.    Procedure Note:  After reviewing the risks, benefits and alternatives to a steroid injection, which include but are not limited to; hypopigmentation, fat necrosis/atrophy, pain, swelling, bleeding, bruising, damage to nearby nerves/vessels, allergic reaction , transient elevation in blood glucose levels and infection a verbal consent was obtained. A time-out was then performed and the affected hand was prepped with chlorhexadine soap and ethyl chloride was used to numb the skin. The right carpal tunnel was injected with 0.5cc: 0.5cc mixture of Kenalog - 40 mg/ml and Lidocaine - 1% / 2 ml. The injection was well tolerated and a sterile dressing was applied. There were no complications. I advised the patient that they might experience some local discomfort for the next couple days and can apply ice to the site as needed.    Assessment / Plan    Assessment/Plan:   Mariaelena Easley is a 80 y.o. female with very mild right carpal tunnel and asymptomatic mild right cubital tunnel syndrome.    I discussed with the patient their clinical and electrodiagnostic  findings demonstrate carpal tunnel syndrome.  The pathophysiology of the condition, including compression of the median nerve in the carpal tunnel, was explained in detail.  It was also discussed that with more severe symptomatology, such as persistent and worsening paresthesias, that permanent nerve damage may result, which may make improvement after surgery less predictable.  Both conservative and surgical options were discussed.  Conservative treatments in the form of: observation, gentle nerve gliding exercises, night time splinting, and injection were presented.  Operative treatment in the form of open carpal tunnel release was presented and reiterated that the goal of surgery is to prevent further compression and damage to the nerve.  The patient has weak clinical exam findings and history for carpal tunnel syndrome.  Given her diffuse complaints and positive EMG findings I discussed my recommendation for therapeutic and diagnostic corticosteroid injection into the right carpal tunnel.  I discussed that literature shows carpal tunnel pain can radiate up into the arm and shoulder.  However I cannot guarantee that this is causing the majority of her symptoms, but injection will help delineate any further causes.  Additionally, her EMG shows mild cubital tunnel syndrome, however her exam is relatively benign and this could be a false positive result.  Patient expressed understanding.  She is agreeable to a corticosteroid injection today as well as nighttime wrist splinting and nerve glides.  I will see her back in 6 weeks for reevaluation of her response to the injection.  We will they were agreeable with the plan.  All questions and concerns were addressed.       ICD-10-CM ICD-9-CM   1. Carpal tunnel syndrome of right wrist  G56.01 354.0   2. Cubital tunnel syndrome on right  G56.21 354.2     Follow Up:   Return in about 6 weeks (around 7/17/2025) for Follow Up.      Joselin Brower MD  [facility] [place] Orthopedic &  Hand Surgeon

## 2025-07-17 ENCOUNTER — OFFICE VISIT (OUTPATIENT)
Dept: ORTHOPEDIC SURGERY | Facility: CLINIC | Age: 80
End: 2025-07-17
Payer: MEDICARE

## 2025-07-17 VITALS
BODY MASS INDEX: 25.33 KG/M2 | WEIGHT: 148.4 LBS | SYSTOLIC BLOOD PRESSURE: 120 MMHG | HEIGHT: 64 IN | DIASTOLIC BLOOD PRESSURE: 76 MMHG

## 2025-07-17 DIAGNOSIS — G56.01 CARPAL TUNNEL SYNDROME OF RIGHT WRIST: Primary | ICD-10-CM

## 2025-07-17 DIAGNOSIS — G56.21 CUBITAL TUNNEL SYNDROME ON RIGHT: ICD-10-CM

## 2025-07-17 NOTE — PROGRESS NOTES
[facility] Orthopedic     Office Visit       Date: 07/17/2025   Patient Name: Mariaelena Easley  MRN: [id number]  YOB: 1945    Referring Physician: No ref. provider found     Chief Complaint:   Chief Complaint   Patient presents with    Follow-up     6 week follow up -- Carpal tunnel syndrome of right wrist; Cubital tunnel syndrome on right     History of Present Illness:   Mariaelena Easley is a 80 y.o. female right-hand-dominant presented clinic for follow-up of very mild right carpal tunnel syndrome and asymptomatic mild right cubital tunnel syndrome.  Patient last clinic visit she received a right carpal tunnel corticosteroid injection.  She reports her carpal tunnel injection has improved her symptoms significantly.  She denies any pain numbness or tingling today.  She overall feels improved.  No other complaints or concerns.    Subjective   Review of Systems:   Review of Systems   Constitutional:  Negative for chills, fever, unexpected weight gain and unexpected weight loss.   HENT:  Negative for congestion, postnasal drip and rhinorrhea.    Eyes:  Negative for blurred vision.   Respiratory:  Negative for shortness of breath.    Cardiovascular:  Negative for leg swelling.   Gastrointestinal:  Negative for abdominal pain, nausea and vomiting.   Genitourinary:  Negative for difficulty urinating.   Musculoskeletal:  Positive for arthralgias. Negative for gait problem, joint swelling and myalgias.   Skin:  Negative for skin lesions and wound.   Neurological:  Negative for dizziness, weakness, light-headedness and numbness.   Hematological:  Does not bruise/bleed easily.   Psychiatric/Behavioral:  Negative for depressed mood.       Pertinent review of systems per HPI    I reviewed the patient's chief complaint, history of present illness, review of systems, past medical history, surgical history, family history, social history,  "medications and allergy list in the EMR on 07/17/2025 and agree with the findings above.    Objective    Vital Signs:   Vitals:    07/17/25 1458   BP: 120/76   Weight: 67.3 kg (148 lb 6.4 oz)   Height: 162.6 cm (64.02\")     General: No acute distress. Alert and oriented.   Cardiovascular: Palpable radial pulse.   Respiratory: Breathing is nonlabored.   Ortho Exam:  Examination of the right Upper Extremity:  No skin lesions or ecchymosis. No edema.    Interosseous wasting is not visualized   Thenar atrophy is not visualized     Hypothenar atrophy is not visualized     negative Tinel's at the carpal tunnel, negative Durkan's compression test,negative Phalen's maneuver   negative Tinel's over Guyon's canal   negative Tinel's at the ulnar nerve at the elbow   negative Elbow Flexion Test   negative Subluxation of the ulnar nerve   negative tenderness with deep palpation of the pronator   negative Tinel's with median nerve at pronator   5/5 APB strength   5/5 FDI strength   Sensation is intact to light touch over the median and ulnar nerve distributions   Digits warm and well-perfused    Imaging / Studies:    Imaging Results (Last 24 Hours)       ** No results found for the last 24 hours. **          EMG nerve conduction study performed on 5/22/2025 demonstrates very mild right carpal tunnel syndrome and mild right cubital tunnel syndrome.     Assessment / Plan    Assessment/Plan:   Mariaelena Easley is a 80 y.o. female with very mild right carpal tunnel syndrome and asymptomatic mild right cubital tunnel syndrome.    The patient responded well to a therapeutic and diagnostic right carpal tunnel injection.  I discussed with her that a positive response to the injection would indicate a positive response to surgical treatment.  She expressed understanding.  She does not feel that she needs any additional treatment at this time as she is still experiencing relief.  She may continue nighttime bracing and nerve gliding " exercises.  She may call the office as needed for repeat injections or discussions of surgery.  All questions and concerns were addressed.  She is agreeable.      ICD-10-CM ICD-9-CM   1. Carpal tunnel syndrome of right wrist  G56.01 354.0   2. Cubital tunnel syndrome on right  G56.21 354.2     Follow Up:   Return if symptoms worsen or fail to improve.      Joselin Brower MD  [facility] [place] Orthopedic & Hand Surgeon

## 2025-08-29 ENCOUNTER — OFFICE VISIT (OUTPATIENT)
Dept: INTERNAL MEDICINE | Age: 80
End: 2025-08-29
Payer: MEDICARE

## 2025-08-29 VITALS
WEIGHT: 150 LBS | HEIGHT: 64 IN | HEART RATE: 78 BPM | DIASTOLIC BLOOD PRESSURE: 78 MMHG | OXYGEN SATURATION: 99 % | TEMPERATURE: 97 F | SYSTOLIC BLOOD PRESSURE: 126 MMHG | BODY MASS INDEX: 25.61 KG/M2

## 2025-08-29 DIAGNOSIS — E11.9 TYPE 2 DIABETES MELLITUS WITHOUT COMPLICATION, WITHOUT LONG-TERM CURRENT USE OF INSULIN: Primary | ICD-10-CM

## 2025-08-29 DIAGNOSIS — E07.9 THYROID DISEASE: ICD-10-CM

## 2025-08-29 DIAGNOSIS — I10 ESSENTIAL HYPERTENSION: ICD-10-CM

## 2025-08-29 DIAGNOSIS — F41.1 GENERALIZED ANXIETY DISORDER: ICD-10-CM

## 2025-08-29 DIAGNOSIS — E78.2 MIXED HYPERLIPIDEMIA: ICD-10-CM

## 2025-08-29 LAB
EXPIRATION DATE: ABNORMAL
HBA1C MFR BLD: 6.9 % (ref 4.5–5.7)
Lab: ABNORMAL

## 2025-08-29 RX ORDER — METFORMIN HYDROCHLORIDE 500 MG/1
TABLET, EXTENDED RELEASE ORAL
Qty: 360 TABLET | Refills: 3 | Status: SHIPPED | OUTPATIENT
Start: 2025-08-29